# Patient Record
Sex: MALE | Race: WHITE | NOT HISPANIC OR LATINO | Employment: FULL TIME | URBAN - METROPOLITAN AREA
[De-identification: names, ages, dates, MRNs, and addresses within clinical notes are randomized per-mention and may not be internally consistent; named-entity substitution may affect disease eponyms.]

---

## 2017-07-18 ENCOUNTER — HOSPITAL ENCOUNTER (INPATIENT)
Facility: HOSPITAL | Age: 72
LOS: 6 days | Discharge: HOME HEALTH | DRG: 872 | End: 2017-07-24
Attending: EMERGENCY MEDICINE | Admitting: HOSPITALIST
Payer: COMMERCIAL

## 2017-07-18 DIAGNOSIS — R60.9 EDEMA: ICD-10-CM

## 2017-07-18 DIAGNOSIS — E11.59 CONTROLLED TYPE 2 DIABETES MELLITUS WITH OTHER CIRCULATORY COMPLICATION, WITHOUT LONG-TERM CURRENT USE OF INSULIN: Chronic | ICD-10-CM

## 2017-07-18 DIAGNOSIS — L03.116 CELLULITIS OF LEFT LOWER EXTREMITY: ICD-10-CM

## 2017-07-18 DIAGNOSIS — A41.9 SEPSIS, DUE TO UNSPECIFIED ORGANISM: Primary | ICD-10-CM

## 2017-07-18 DIAGNOSIS — I10 ESSENTIAL HYPERTENSION: Chronic | ICD-10-CM

## 2017-07-18 PROBLEM — N39.0 UTI (URINARY TRACT INFECTION): Status: ACTIVE | Noted: 2017-07-18

## 2017-07-18 PROBLEM — R65.20 SEVERE SEPSIS: Status: ACTIVE | Noted: 2017-07-18

## 2017-07-18 PROBLEM — N17.9 ACUTE RENAL FAILURE: Status: ACTIVE | Noted: 2017-07-18

## 2017-07-18 PROBLEM — E44.1 MILD PROTEIN MALNUTRITION: Chronic | Status: ACTIVE | Noted: 2017-07-18

## 2017-07-18 PROBLEM — D63.8 ANEMIA OF CHRONIC DISEASE: Chronic | Status: ACTIVE | Noted: 2017-07-18

## 2017-07-18 PROBLEM — L03.115 CELLULITIS OF RIGHT LOWER EXTREMITY: Status: ACTIVE | Noted: 2017-07-18

## 2017-07-18 PROBLEM — E11.9 TYPE 2 DIABETES MELLITUS, CONTROLLED: Chronic | Status: ACTIVE | Noted: 2017-07-18

## 2017-07-18 LAB
ALBUMIN SERPL BCP-MCNC: 3.4 G/DL
ALP SERPL-CCNC: 77 U/L
ALT SERPL W/O P-5'-P-CCNC: 41 U/L
ANION GAP SERPL CALC-SCNC: 10 MMOL/L
ANISOCYTOSIS BLD QL SMEAR: SLIGHT
AST SERPL-CCNC: 54 U/L
BACTERIA #/AREA URNS HPF: ABNORMAL /HPF
BASOPHILS # BLD AUTO: 0.03 K/UL
BASOPHILS NFR BLD: 0.1 %
BILIRUB SERPL-MCNC: 2 MG/DL
BILIRUB UR QL STRIP: ABNORMAL
BUN SERPL-MCNC: 40 MG/DL
CALCIUM SERPL-MCNC: 9 MG/DL
CHLORIDE SERPL-SCNC: 95 MMOL/L
CLARITY UR: ABNORMAL
CO2 SERPL-SCNC: 23 MMOL/L
COLOR UR: ABNORMAL
CREAT SERPL-MCNC: 2.5 MG/DL
DACRYOCYTES BLD QL SMEAR: ABNORMAL
DIFFERENTIAL METHOD: ABNORMAL
EOSINOPHIL # BLD AUTO: 0.1 K/UL
EOSINOPHIL NFR BLD: 0.2 %
ERYTHROCYTE [DISTWIDTH] IN BLOOD BY AUTOMATED COUNT: 16.2 %
EST. GFR  (AFRICAN AMERICAN): 28 ML/MIN/1.73 M^2
EST. GFR  (NON AFRICAN AMERICAN): 24 ML/MIN/1.73 M^2
GLUCOSE SERPL-MCNC: 200 MG/DL
GLUCOSE UR QL STRIP: NEGATIVE
HCT VFR BLD AUTO: 36.4 %
HGB BLD-MCNC: 12.7 G/DL
HGB UR QL STRIP: ABNORMAL
HYALINE CASTS #/AREA URNS LPF: 8 /LPF
HYPOCHROMIA BLD QL SMEAR: ABNORMAL
KETONES UR QL STRIP: ABNORMAL
LACTATE SERPL-SCNC: 2.2 MMOL/L
LEUKOCYTE ESTERASE UR QL STRIP: NEGATIVE
LYMPHOCYTES # BLD AUTO: 1.1 K/UL
LYMPHOCYTES NFR BLD: 3.9 %
MCH RBC QN AUTO: 33.8 PG
MCHC RBC AUTO-ENTMCNC: 34.9 %
MCV RBC AUTO: 97 FL
MICROSCOPIC COMMENT: ABNORMAL
MONOCYTES # BLD AUTO: 1.5 K/UL
MONOCYTES NFR BLD: 5.6 %
NEUTROPHILS # BLD AUTO: 24.7 K/UL
NEUTROPHILS NFR BLD: 90.2 %
NITRITE UR QL STRIP: NEGATIVE
OVALOCYTES BLD QL SMEAR: ABNORMAL
PH UR STRIP: 5 [PH] (ref 5–8)
PLATELET # BLD AUTO: 304 K/UL
PMV BLD AUTO: 11 FL
POCT GLUCOSE: 198 MG/DL (ref 70–110)
POIKILOCYTOSIS BLD QL SMEAR: SLIGHT
POLYCHROMASIA BLD QL SMEAR: ABNORMAL
POTASSIUM SERPL-SCNC: 4.4 MMOL/L
PROT SERPL-MCNC: 7.1 G/DL
PROT UR QL STRIP: ABNORMAL
RBC # BLD AUTO: 3.76 M/UL
RBC #/AREA URNS HPF: 2 /HPF (ref 0–4)
SODIUM SERPL-SCNC: 128 MMOL/L
SP GR UR STRIP: 1.02 (ref 1–1.03)
SQUAMOUS #/AREA URNS HPF: 4 /HPF
TARGETS BLD QL SMEAR: ABNORMAL
URN SPEC COLLECT METH UR: ABNORMAL
UROBILINOGEN UR STRIP-ACNC: 1 EU/DL
WBC # BLD AUTO: 27.45 K/UL
WBC #/AREA URNS HPF: 8 /HPF (ref 0–5)
WBC CASTS #/AREA URNS LPF: 1 /LPF

## 2017-07-18 PROCEDURE — 82570 ASSAY OF URINE CREATININE: CPT

## 2017-07-18 PROCEDURE — 82962 GLUCOSE BLOOD TEST: CPT

## 2017-07-18 PROCEDURE — 12000002 HC ACUTE/MED SURGE SEMI-PRIVATE ROOM

## 2017-07-18 PROCEDURE — 93005 ELECTROCARDIOGRAM TRACING: CPT

## 2017-07-18 PROCEDURE — 63600175 PHARM REV CODE 636 W HCPCS: Performed by: EMERGENCY MEDICINE

## 2017-07-18 PROCEDURE — 83605 ASSAY OF LACTIC ACID: CPT

## 2017-07-18 PROCEDURE — 96366 THER/PROPH/DIAG IV INF ADDON: CPT

## 2017-07-18 PROCEDURE — 80053 COMPREHEN METABOLIC PANEL: CPT

## 2017-07-18 PROCEDURE — 87040 BLOOD CULTURE FOR BACTERIA: CPT

## 2017-07-18 PROCEDURE — 96365 THER/PROPH/DIAG IV INF INIT: CPT

## 2017-07-18 PROCEDURE — 21400001 HC TELEMETRY ROOM

## 2017-07-18 PROCEDURE — 84300 ASSAY OF URINE SODIUM: CPT

## 2017-07-18 PROCEDURE — 85025 COMPLETE CBC W/AUTO DIFF WBC: CPT

## 2017-07-18 PROCEDURE — 96367 TX/PROPH/DG ADDL SEQ IV INF: CPT

## 2017-07-18 PROCEDURE — 25000003 PHARM REV CODE 250: Performed by: EMERGENCY MEDICINE

## 2017-07-18 PROCEDURE — 81000 URINALYSIS NONAUTO W/SCOPE: CPT

## 2017-07-18 PROCEDURE — 99285 EMERGENCY DEPT VISIT HI MDM: CPT | Mod: 25

## 2017-07-18 PROCEDURE — 87086 URINE CULTURE/COLONY COUNT: CPT

## 2017-07-18 PROCEDURE — 96361 HYDRATE IV INFUSION ADD-ON: CPT

## 2017-07-18 RX ORDER — METOPROLOL SUCCINATE 25 MG/1
25 TABLET, EXTENDED RELEASE ORAL 2 TIMES DAILY
Status: ON HOLD | COMMUNITY
End: 2017-07-24 | Stop reason: HOSPADM

## 2017-07-18 RX ORDER — SULFAMETHOXAZOLE AND TRIMETHOPRIM 800; 160 MG/1; MG/1
1 TABLET ORAL 2 TIMES DAILY
Status: ON HOLD | COMMUNITY
End: 2017-07-24 | Stop reason: HOSPADM

## 2017-07-18 RX ORDER — SULINDAC 150 MG/1
150 TABLET ORAL 2 TIMES DAILY
Status: ON HOLD | COMMUNITY
End: 2017-07-24 | Stop reason: HOSPADM

## 2017-07-18 RX ORDER — ACETAMINOPHEN 500 MG
1000 TABLET ORAL
Status: COMPLETED | OUTPATIENT
Start: 2017-07-18 | End: 2017-07-18

## 2017-07-18 RX ORDER — SODIUM CHLORIDE 9 MG/ML
1000 INJECTION, SOLUTION INTRAVENOUS
Status: COMPLETED | OUTPATIENT
Start: 2017-07-18 | End: 2017-07-18

## 2017-07-18 RX ADMIN — PIPERACILLIN SODIUM AND TAZOBACTAM SODIUM 4.5 G: 4; .5 INJECTION, POWDER, LYOPHILIZED, FOR SOLUTION INTRAVENOUS at 10:07

## 2017-07-18 RX ADMIN — VANCOMYCIN HYDROCHLORIDE 1000 MG: 1 INJECTION, POWDER, LYOPHILIZED, FOR SOLUTION INTRAVENOUS at 10:07

## 2017-07-18 RX ADMIN — ACETAMINOPHEN 1000 MG: 500 TABLET ORAL at 06:07

## 2017-07-18 RX ADMIN — SODIUM CHLORIDE 1000 ML: 0.9 INJECTION, SOLUTION INTRAVENOUS at 06:07

## 2017-07-18 RX ADMIN — SODIUM CHLORIDE 1000 ML: 0.9 INJECTION, SOLUTION INTRAVENOUS at 10:07

## 2017-07-18 NOTE — LETTER
July 24, 2017         [place] [address]  Phone: [phone]       Patient: Axel Burgos   YOB: 1945  Date of Visit: 07/24/2017    To Whom It May Concern:    Axel Waters was at [facility] on 7/18/2017 to 07/24/2017. He may return to work on 8/7/2017 with no restrictions. If you have any questions or concerns, or if I can be of further assistance, please do not hesitate to contact me.    Sincerely,    Leona Gracia MD
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
## 2017-07-19 PROBLEM — L03.116 CELLULITIS OF LEFT LOWER EXTREMITY: Status: ACTIVE | Noted: 2017-07-18

## 2017-07-19 LAB
ALBUMIN SERPL BCP-MCNC: 3 G/DL
ALP SERPL-CCNC: 72 U/L
ALT SERPL W/O P-5'-P-CCNC: 38 U/L
ANION GAP SERPL CALC-SCNC: 8 MMOL/L
AST SERPL-CCNC: 56 U/L
BASOPHILS # BLD AUTO: 0.02 K/UL
BASOPHILS NFR BLD: 0.1 %
BILIRUB SERPL-MCNC: 1.9 MG/DL
BUN SERPL-MCNC: 34 MG/DL
CALCIUM SERPL-MCNC: 8.7 MG/DL
CHLORIDE SERPL-SCNC: 101 MMOL/L
CO2 SERPL-SCNC: 26 MMOL/L
CREAT SERPL-MCNC: 1.8 MG/DL
CREAT UR-MCNC: 293.8 MG/DL
DIFFERENTIAL METHOD: ABNORMAL
EOSINOPHIL # BLD AUTO: 0 K/UL
EOSINOPHIL NFR BLD: 0.2 %
ERYTHROCYTE [DISTWIDTH] IN BLOOD BY AUTOMATED COUNT: 15.8 %
EST. GFR  (AFRICAN AMERICAN): 42 ML/MIN/1.73 M^2
EST. GFR  (NON AFRICAN AMERICAN): 36 ML/MIN/1.73 M^2
ESTIMATED AVG GLUCOSE: 140 MG/DL
GLUCOSE SERPL-MCNC: 117 MG/DL
HBA1C MFR BLD HPLC: 6.5 %
HCT VFR BLD AUTO: 36.1 %
HGB BLD-MCNC: 12.2 G/DL
LYMPHOCYTES # BLD AUTO: 1.2 K/UL
LYMPHOCYTES NFR BLD: 7.4 %
MAGNESIUM SERPL-MCNC: 2 MG/DL
MCH RBC QN AUTO: 32.6 PG
MCHC RBC AUTO-ENTMCNC: 33.8 %
MCV RBC AUTO: 97 FL
MONOCYTES # BLD AUTO: 1.3 K/UL
MONOCYTES NFR BLD: 7.6 %
NEUTROPHILS # BLD AUTO: 14.1 K/UL
NEUTROPHILS NFR BLD: 84.7 %
PHOSPHATE SERPL-MCNC: 2.7 MG/DL
PLATELET # BLD AUTO: 124 K/UL
PMV BLD AUTO: 10.9 FL
POCT GLUCOSE: 129 MG/DL (ref 70–110)
POCT GLUCOSE: 166 MG/DL (ref 70–110)
POCT GLUCOSE: 179 MG/DL (ref 70–110)
POCT GLUCOSE: 191 MG/DL (ref 70–110)
POTASSIUM SERPL-SCNC: 3.9 MMOL/L
PROT SERPL-MCNC: 6.6 G/DL
RBC # BLD AUTO: 3.74 M/UL
SODIUM SERPL-SCNC: 135 MMOL/L
SODIUM UR-SCNC: 34 MMOL/L
WBC # BLD AUTO: 16.63 K/UL

## 2017-07-19 PROCEDURE — 63600175 PHARM REV CODE 636 W HCPCS: Performed by: INTERNAL MEDICINE

## 2017-07-19 PROCEDURE — 25000003 PHARM REV CODE 250: Performed by: EMERGENCY MEDICINE

## 2017-07-19 PROCEDURE — 63600175 PHARM REV CODE 636 W HCPCS: Performed by: EMERGENCY MEDICINE

## 2017-07-19 PROCEDURE — 83735 ASSAY OF MAGNESIUM: CPT

## 2017-07-19 PROCEDURE — 84100 ASSAY OF PHOSPHORUS: CPT

## 2017-07-19 PROCEDURE — 36415 COLL VENOUS BLD VENIPUNCTURE: CPT

## 2017-07-19 PROCEDURE — 83036 HEMOGLOBIN GLYCOSYLATED A1C: CPT

## 2017-07-19 PROCEDURE — 85025 COMPLETE CBC W/AUTO DIFF WBC: CPT

## 2017-07-19 PROCEDURE — 25000003 PHARM REV CODE 250: Performed by: INTERNAL MEDICINE

## 2017-07-19 PROCEDURE — 80053 COMPREHEN METABOLIC PANEL: CPT

## 2017-07-19 PROCEDURE — 21400001 HC TELEMETRY ROOM

## 2017-07-19 RX ORDER — SODIUM CHLORIDE 9 MG/ML
INJECTION, SOLUTION INTRAVENOUS CONTINUOUS
Status: ACTIVE | OUTPATIENT
Start: 2017-07-19 | End: 2017-07-20

## 2017-07-19 RX ORDER — INSULIN ASPART 100 [IU]/ML
0-5 INJECTION, SOLUTION INTRAVENOUS; SUBCUTANEOUS
Status: DISCONTINUED | OUTPATIENT
Start: 2017-07-19 | End: 2017-07-24 | Stop reason: HOSPADM

## 2017-07-19 RX ORDER — CLONIDINE HYDROCHLORIDE 0.1 MG/1
0.1 TABLET ORAL 3 TIMES DAILY PRN
Status: DISCONTINUED | OUTPATIENT
Start: 2017-07-19 | End: 2017-07-24 | Stop reason: HOSPADM

## 2017-07-19 RX ORDER — HEPARIN SODIUM 5000 [USP'U]/ML
5000 INJECTION, SOLUTION INTRAVENOUS; SUBCUTANEOUS EVERY 12 HOURS
Status: DISCONTINUED | OUTPATIENT
Start: 2017-07-19 | End: 2017-07-24 | Stop reason: HOSPADM

## 2017-07-19 RX ORDER — METOPROLOL SUCCINATE 25 MG/1
25 TABLET, EXTENDED RELEASE ORAL DAILY
Status: DISCONTINUED | OUTPATIENT
Start: 2017-07-19 | End: 2017-07-24 | Stop reason: HOSPADM

## 2017-07-19 RX ORDER — IBUPROFEN 200 MG
24 TABLET ORAL
Status: DISCONTINUED | OUTPATIENT
Start: 2017-07-19 | End: 2017-07-24 | Stop reason: HOSPADM

## 2017-07-19 RX ORDER — ONDANSETRON 2 MG/ML
8 INJECTION INTRAMUSCULAR; INTRAVENOUS EVERY 8 HOURS PRN
Status: DISCONTINUED | OUTPATIENT
Start: 2017-07-19 | End: 2017-07-24 | Stop reason: HOSPADM

## 2017-07-19 RX ORDER — HYDROCODONE BITARTRATE AND ACETAMINOPHEN 5; 325 MG/1; MG/1
1 TABLET ORAL EVERY 4 HOURS PRN
Status: DISCONTINUED | OUTPATIENT
Start: 2017-07-19 | End: 2017-07-19

## 2017-07-19 RX ORDER — RAMELTEON 8 MG/1
8 TABLET ORAL NIGHTLY PRN
Status: DISCONTINUED | OUTPATIENT
Start: 2017-07-19 | End: 2017-07-24 | Stop reason: HOSPADM

## 2017-07-19 RX ORDER — HYDROCODONE BITARTRATE AND ACETAMINOPHEN 10; 325 MG/1; MG/1
1 TABLET ORAL EVERY 4 HOURS PRN
Status: DISCONTINUED | OUTPATIENT
Start: 2017-07-19 | End: 2017-07-19

## 2017-07-19 RX ORDER — ONDANSETRON 8 MG/1
8 TABLET, ORALLY DISINTEGRATING ORAL EVERY 8 HOURS PRN
Status: DISCONTINUED | OUTPATIENT
Start: 2017-07-19 | End: 2017-07-19

## 2017-07-19 RX ORDER — IBUPROFEN 200 MG
16 TABLET ORAL
Status: DISCONTINUED | OUTPATIENT
Start: 2017-07-19 | End: 2017-07-24 | Stop reason: HOSPADM

## 2017-07-19 RX ORDER — SODIUM CHLORIDE 0.9 % (FLUSH) 0.9 %
3 SYRINGE (ML) INJECTION EVERY 8 HOURS
Status: DISCONTINUED | OUTPATIENT
Start: 2017-07-19 | End: 2017-07-19

## 2017-07-19 RX ORDER — HEPARIN SODIUM 5000 [USP'U]/ML
5000 INJECTION, SOLUTION INTRAVENOUS; SUBCUTANEOUS EVERY 8 HOURS
Status: DISCONTINUED | OUTPATIENT
Start: 2017-07-19 | End: 2017-07-19

## 2017-07-19 RX ORDER — GLUCAGON 1 MG
1 KIT INJECTION
Status: DISCONTINUED | OUTPATIENT
Start: 2017-07-19 | End: 2017-07-24 | Stop reason: HOSPADM

## 2017-07-19 RX ORDER — HYDROCODONE BITARTRATE AND ACETAMINOPHEN 5; 325 MG/1; MG/1
1 TABLET ORAL EVERY 4 HOURS PRN
Status: DISCONTINUED | OUTPATIENT
Start: 2017-07-19 | End: 2017-07-24 | Stop reason: HOSPADM

## 2017-07-19 RX ORDER — FAMOTIDINE 20 MG/1
20 TABLET, FILM COATED ORAL DAILY
Status: DISCONTINUED | OUTPATIENT
Start: 2017-07-19 | End: 2017-07-19

## 2017-07-19 RX ORDER — AMOXICILLIN 250 MG
1 CAPSULE ORAL 2 TIMES DAILY
Status: DISCONTINUED | OUTPATIENT
Start: 2017-07-19 | End: 2017-07-19

## 2017-07-19 RX ORDER — INSULIN ASPART 100 [IU]/ML
3 INJECTION, SOLUTION INTRAVENOUS; SUBCUTANEOUS
Status: DISCONTINUED | OUTPATIENT
Start: 2017-07-19 | End: 2017-07-24 | Stop reason: HOSPADM

## 2017-07-19 RX ORDER — ACETAMINOPHEN 500 MG
500 TABLET ORAL EVERY 6 HOURS PRN
Status: DISCONTINUED | OUTPATIENT
Start: 2017-07-19 | End: 2017-07-24 | Stop reason: HOSPADM

## 2017-07-19 RX ORDER — ACETAMINOPHEN 325 MG/1
650 TABLET ORAL EVERY 8 HOURS PRN
Status: DISCONTINUED | OUTPATIENT
Start: 2017-07-19 | End: 2017-07-19

## 2017-07-19 RX ADMIN — INSULIN ASPART 3 UNITS: 100 INJECTION, SOLUTION INTRAVENOUS; SUBCUTANEOUS at 12:07

## 2017-07-19 RX ADMIN — PIPERACILLIN SODIUM AND TAZOBACTAM SODIUM 4.5 G: 4; .5 INJECTION, POWDER, LYOPHILIZED, FOR SOLUTION INTRAVENOUS at 03:07

## 2017-07-19 RX ADMIN — INSULIN ASPART 3 UNITS: 100 INJECTION, SOLUTION INTRAVENOUS; SUBCUTANEOUS at 06:07

## 2017-07-19 RX ADMIN — HEPARIN SODIUM 5000 UNITS: 5000 INJECTION, SOLUTION INTRAVENOUS; SUBCUTANEOUS at 09:07

## 2017-07-19 RX ADMIN — HYDROCODONE BITARTRATE AND ACETAMINOPHEN 1 TABLET: 5; 325 TABLET ORAL at 10:07

## 2017-07-19 RX ADMIN — METOPROLOL SUCCINATE 25 MG: 25 TABLET, EXTENDED RELEASE ORAL at 10:07

## 2017-07-19 RX ADMIN — PIPERACILLIN SODIUM AND TAZOBACTAM SODIUM 4.5 G: 4; .5 INJECTION, POWDER, LYOPHILIZED, FOR SOLUTION INTRAVENOUS at 06:07

## 2017-07-19 RX ADMIN — HEPARIN SODIUM 5000 UNITS: 5000 INJECTION, SOLUTION INTRAVENOUS; SUBCUTANEOUS at 10:07

## 2017-07-19 RX ADMIN — SODIUM CHLORIDE: 0.9 INJECTION, SOLUTION INTRAVENOUS at 12:07

## 2017-07-19 RX ADMIN — INSULIN DETEMIR 10 UNITS: 100 INJECTION, SOLUTION SUBCUTANEOUS at 09:07

## 2017-07-19 RX ADMIN — INSULIN ASPART 3 UNITS: 100 INJECTION, SOLUTION INTRAVENOUS; SUBCUTANEOUS at 10:07

## 2017-07-19 RX ADMIN — VANCOMYCIN HYDROCHLORIDE 1000 MG: 1 INJECTION, POWDER, LYOPHILIZED, FOR SOLUTION INTRAVENOUS at 09:07

## 2017-07-19 RX ADMIN — SODIUM CHLORIDE 1000 ML: 0.9 INJECTION, SOLUTION INTRAVENOUS at 12:07

## 2017-07-19 NOTE — PLAN OF CARE
07/19/17 1459   Discharge Assessment   Assessment Type Discharge Planning Assessment   Confirmed/corrected address and phone number on facesheet? Yes   Assessment information obtained from? Patient   Prior to hospitilization cognitive status: Alert/Oriented   Prior to hospitalization functional status: Independent   Current cognitive status: Alert/Oriented   Current Functional Status: Independent   Arrived From admitted as an inpatient;home or self-care   Lives With friend(s)   Able to Return to Prior Arrangements yes   Is patient able to care for self after discharge? Yes   How many people do you have in your home that can help with your care after discharge? 1   Who are your caregiver(s) and their phone number(s)? Jannet schmitz girlfriend [phone]   Patient's perception of discharge disposition home or selfcare;home health   Readmission Within The Last 30 Days no previous admission in last 30 days   Patient currently being followed by outpatient case management? No   Patient currently receives home health services? No   Does the patient currently use HME? No   Patient currently receives private duty nursing? No   Patient currently receives any other outside agency services? No   Equipment Currently Used at Home none   Do you have any problems affording any of your prescribed medications? No   Is the patient taking medications as prescribed? yes   Do you have any financial concerns preventing you from receiving the healthcare you need? No   Does the patient have transportation to healthcare appointments? Yes   Transportation Available car   On Dialysis? No   Does the patient receive services at the Coumadin Clinic? No   Are there any open cases? No   Discharge Plan A Home with family   Discharge Plan B Home with family   Patient/Family In Agreement With Plan yes     SW to patient's room to discuss Helping the patient manage care at home. ARIEL roll explained to pt.  Teach back method used.  SW's name and contact  info placed on white board.  Pt/family encouraged to call for any problems/concerns with DC.

## 2017-07-19 NOTE — ASSESSMENT & PLAN NOTE
Patient's urinalysis is significant for a specific gravity of 1.025, 1+ protein, 1+ ketones, trace occult blood, 8 WBCs, and 8 hyaline casts.  Urine output has been stable.  Will obtain additional urine studies; provide aggressive IV fluid hydration; monitor the urine output; recheck the renal function in the morning; and avoid nephrotoxins.

## 2017-07-19 NOTE — ASSESSMENT & PLAN NOTE
Well-controlled on a home regimen of metformin; will provide basal-prandial insulin along with insulin sliding scale.

## 2017-07-19 NOTE — ED NOTES
Received report from Maeve MONTIEL. 1st contact with pt. Pt AAO x 3, REU, skin warm w/ redness/celluclitis on left leg and foot and hot, dry, and flaky right leg. . Side rails up x 2, bed in low position, wheels locked. Call bell within reach. Pt denies pain or dizziness at this time. Will continue to monitor.  at bedside

## 2017-07-19 NOTE — PLAN OF CARE
Problem: Patient Care Overview  Goal: Plan of Care Review  BG monitored and insulin coverage provided. No falls or injury. Bed alarm activated. Pt complaint with fall and safety plan of care. LLE hot to touch, edematous. LLE US (-) DVT. IV fluids. Pt on IV Vanc and Zosyn. Vanc T. Due 07/21/2017 2130. Med Risk-VTE. Pt on heparin. BM 07/18/2017. Consults to . Continue plan of care.

## 2017-07-19 NOTE — ASSESSMENT & PLAN NOTE
Patient has a left lower extremity lesion that is suspicious for cellulitis.  He does have a fever and leukocytosis, and therefore meets criteria for sepsis.  Ultrasound of the LLE is pending.  Blood cultures are pending and he has been started on empiric antibiotics.

## 2017-07-19 NOTE — ASSESSMENT & PLAN NOTE
Patient also complained of urinary symptoms and his urinalysis is significant for a specific gravity of 1.025, hazy appearance, 8 WBCs, and moderate bacteria.  Urine cultures are pending and he has been started on empiric antibiotic therapy.

## 2017-07-19 NOTE — SUBJECTIVE & OBJECTIVE
Past Medical History:   Diagnosis Date    Diabetes mellitus     Hypertension     Varicose veins        Past Surgical History:   Procedure Laterality Date    VARICOSE VEIN SURGERY         Review of patient's allergies indicates:  No Known Allergies    No current facility-administered medications on file prior to encounter.      Current Outpatient Prescriptions on File Prior to Encounter   Medication Sig    hydrocortisone 1 % cream Apply topically as needed.    metformin (GLUCOPHAGE) 500 MG tablet Take 500 mg by mouth 2 (two) times daily with meals.    pseudoephedrine (SUDAFED) 30 MG tablet Take 30 mg by mouth every 4 (four) hours as needed for Congestion.     Family History     None        Social History Main Topics    Smoking status: Never Smoker    Smokeless tobacco: Never Used    Alcohol use No    Drug use: No    Sexual activity: No     Review of Systems   Constitutional: Negative for activity change, appetite change, chills, diaphoresis, fatigue, fever and unexpected weight change.   HENT: Negative.    Eyes: Negative.    Respiratory: Negative for cough, chest tightness, shortness of breath and wheezing.    Cardiovascular: Negative for chest pain, palpitations and leg swelling.   Gastrointestinal: Negative for abdominal distention, abdominal pain, blood in stool, constipation, diarrhea, nausea and vomiting.   Genitourinary: Positive for frequency. Negative for dysuria, hematuria and urgency.   Musculoskeletal: Negative.    Skin:        Left leg pain, swelling, and redness   Neurological: Negative for dizziness, seizures, syncope, weakness and light-headedness.   Psychiatric/Behavioral: Negative.      Objective:     Vital Signs (Most Recent):  Temp: 97.8 °F (36.6 °C) (07/18/17 2355)  Pulse: 67 (07/18/17 2355)  Resp: 20 (07/18/17 2355)  BP: 117/61 (07/18/17 2355)  SpO2: 95 % (07/18/17 2355) Vital Signs (24h Range):  Temp:  [97.8 °F (36.6 °C)-103 °F (39.4 °C)] 97.8 °F (36.6 °C)  Pulse:  [] 67  Resp:   [20] 20  SpO2:  [95 %-98 %] 95 %  BP: (114-141)/(57-83) 117/61     Weight: 88.8 kg (195 lb 12.8 oz)  Body mass index is 25.14 kg/m².    Physical Exam   Constitutional: He is oriented to person, place, and time. He appears well-developed and well-nourished. No distress.   HENT:   Head: Normocephalic and atraumatic.   Right Ear: External ear normal.   Left Ear: External ear normal.   Nose: Nose normal.   Eyes: Right eye exhibits no discharge. Left eye exhibits no discharge.   Neck: Normal range of motion.   Cardiovascular: Normal rate, regular rhythm, normal heart sounds and intact distal pulses.  Exam reveals no gallop and no friction rub.    No murmur heard.  Pulmonary/Chest: Effort normal and breath sounds normal. No respiratory distress. He has no wheezes. He has no rales. He exhibits no tenderness.   Abdominal: Soft. Bowel sounds are normal. He exhibits no distension. There is no tenderness. There is no rebound and no guarding.   Musculoskeletal: Normal range of motion. He exhibits edema.   Neurological: He is alert and oriented to person, place, and time.   Skin: Skin is warm. Rash noted. He is not diaphoretic. There is erythema.   Circumferential erythema to the lower left leg with edema and a wound to the medial aspect of his foot, no drainage   Psychiatric: He has a normal mood and affect. His behavior is normal. Judgment and thought content normal.   Nursing note and vitals reviewed.       Significant Labs: All pertinent labs within the past 24 hours have been reviewed.    Significant Imaging: I have reviewed and interpreted all pertinent imaging results/findings within the past 24 hours.

## 2017-07-19 NOTE — PLAN OF CARE
Problem: Diabetes, Type 2 (Adult)  Goal: Signs and Symptoms of Listed Potential Problems Will be Absent, Minimized or Managed (Diabetes, Type 2)  Signs and symptoms of listed potential problems will be absent, minimized or managed by discharge/transition of care (reference Diabetes, Type 2 (Adult) CPG).   Outcome: Ongoing (interventions implemented as appropriate)   07/19/17 0307   Diabetes, Type 2   Problems Assessed (Type 2 Diabetes) all   Problems Present (Type 2 Diabetes) hyperglycemia       Problem: Fall Risk (Adult)  Goal: Identify Related Risk Factors and Signs and Symptoms  Related risk factors and signs and symptoms are identified upon initiation of Human Response Clinical Practice Guideline (CPG)   Outcome: Ongoing (interventions implemented as appropriate)   07/19/17 0307   Fall Risk   Related Risk Factors (Fall Risk) age-related changes;bladder function altered;history of falls;environment unfamiliar   Signs and Symptoms (Fall Risk) presence of risk factors     Plan of care reviewed with patient. Printout on cellulitis given to patient. Voices understanding. IV antibiotics given as ordered. Left leg red, hot to touch. Denies pain. Has been out of his metformin for most of July, but got new prescriptions from  yesterday. Stressed importance of adhering to meds. Fall precautions in progress with bed alarm in use.    Problem: Patient Care Overview  Goal: Plan of Care Review  Outcome: Ongoing (interventions implemented as appropriate)   07/19/17 0307   Coping/Psychosocial   Plan Of Care Reviewed With patient       Problem: Skin and Soft Tissue Infection (Adult)  Goal: Signs and Symptoms of Listed Potential Problems Will be Absent, Minimized or Managed (Skin and Soft Tissue Infection)  Signs and symptoms of listed potential problems will be absent, minimized or managed by discharge/transition of care (reference Skin and Soft Tissue Infection (Adult) CPG).   Outcome: Ongoing (interventions implemented as  appropriate)   07/19/17 0303   Skin and Soft Tissue Infection   Problems Assessed (Skin and Soft Tissue Infection) all   Problems Present (Skin and Soft Tissue Infection) infection progression

## 2017-07-19 NOTE — H&P
[facility] Hospital Medicine  History & Physical    Patient Name: Axel Burgos  MRN: [id number]  Admission Date: 7/18/2017  Attending Physician: Leona Gracia MD   Primary Care Provider: Ahsan Guzman MD         Patient information was obtained from patient.     Subjective:     Principal Problem:Cellulitis of left lower extremity    Chief Complaint: Confusion today.    HPI: Mr. Axel Burgos is a 72 y.o. male with essential hypertension, type 2 diabetes mellitus (HbA1c 6.4% Dec 2015), anemia of chronic disease, and mild protein malnutrition who presents to [facility] ED with complaints of confusion today.  He was observed to be driving erratically today and was pulled over by police, after which EMS was activated and thought he was in atrial flutter; he was transported to the ED here.  He denies being confused and remembers the events that transpired today.  He pulled out in front of a police car which led to his being pulled over.  He had just come from his PCP's office for which he was prescribed antibiotics for his left leg infection and a possible urinary tract infection.  He has been dealing with a left lower extremity wound for the past two months.  He admits to picking at it constantly and it started to get more red, swollen, and painful in the last few weeks.  He cannot recall any trauma to the area and it has not affected his ambulation.  He also complains that he constantly urinates and thinks that it's due to his prostate problems.  He denies any dysuria but does dribble a lot.  He also thinks that it's more concentrated than usual.  He has not experienced any fevers, chills, nausea, nor any vomiting, but has been feeling a bit weak lately.  His appetite has been good and he hasn't lost any weight.  He was not able to fill his medications.    He does report an episode of falling today.  He was bending over to  some change but had lost his balance and fell.  He denies any  antecedent chest pain, shortness of breath, nor palpitations, and did not sustain any head trauma nor loss of consciousness.    Chart Review:  Previous Hospitalizations  Date Hospital Diagnosis   Dec 2015 [facility] RLE cellulitis      Past Medical History:   Diagnosis Date    Diabetes mellitus     Hypertension     Varicose veins        Past Surgical History:   Procedure Laterality Date    VARICOSE VEIN SURGERY         Review of patient's allergies indicates:  No Known Allergies    No current facility-administered medications on file prior to encounter.      Current Outpatient Prescriptions on File Prior to Encounter   Medication Sig    hydrocortisone 1 % cream Apply topically as needed.    metformin (GLUCOPHAGE) 500 MG tablet Take 500 mg by mouth 2 (two) times daily with meals.    pseudoephedrine (SUDAFED) 30 MG tablet Take 30 mg by mouth every 4 (four) hours as needed for Congestion.     Family History     None        Social History Main Topics    Smoking status: Never Smoker    Smokeless tobacco: Never Used    Alcohol use No    Drug use: No    Sexual activity: No     Review of Systems   Constitutional: Negative for activity change, appetite change, chills, diaphoresis, fatigue, fever and unexpected weight change.   HENT: Negative.    Eyes: Negative.    Respiratory: Negative for cough, chest tightness, shortness of breath and wheezing.    Cardiovascular: Negative for chest pain, palpitations and leg swelling.   Gastrointestinal: Negative for abdominal distention, abdominal pain, blood in stool, constipation, diarrhea, nausea and vomiting.   Genitourinary: Positive for frequency. Negative for dysuria, hematuria and urgency.   Musculoskeletal: Negative.    Skin:        Left leg pain, swelling, and redness   Neurological: Negative for dizziness, seizures, syncope, weakness and light-headedness.   Psychiatric/Behavioral: Negative.      Objective:     Vital Signs (Most Recent):  Temp: 97.8 °F (36.6 °C)  (07/18/17 2355)  Pulse: 67 (07/18/17 2355)  Resp: 20 (07/18/17 2355)  BP: 117/61 (07/18/17 2355)  SpO2: 95 % (07/18/17 2355) Vital Signs (24h Range):  Temp:  [97.8 °F (36.6 °C)-103 °F (39.4 °C)] 97.8 °F (36.6 °C)  Pulse:  [] 67  Resp:  [20] 20  SpO2:  [95 %-98 %] 95 %  BP: (114-141)/(57-83) 117/61     Weight: 88.8 kg (195 lb 12.8 oz)  Body mass index is 25.14 kg/m².    Physical Exam   Constitutional: He is oriented to person, place, and time. He appears well-developed and well-nourished. No distress.   HENT:   Head: Normocephalic and atraumatic.   Right Ear: External ear normal.   Left Ear: External ear normal.   Nose: Nose normal.   Eyes: Right eye exhibits no discharge. Left eye exhibits no discharge.   Neck: Normal range of motion.   Cardiovascular: Normal rate, regular rhythm, normal heart sounds and intact distal pulses.  Exam reveals no gallop and no friction rub.    No murmur heard.  Pulmonary/Chest: Effort normal and breath sounds normal. No respiratory distress. He has no wheezes. He has no rales. He exhibits no tenderness.   Abdominal: Soft. Bowel sounds are normal. He exhibits no distension. There is no tenderness. There is no rebound and no guarding.   Musculoskeletal: Normal range of motion. He exhibits edema.   Neurological: He is alert and oriented to person, place, and time.   Skin: Skin is warm. Rash noted. He is not diaphoretic. There is erythema.   Circumferential erythema to the lower left leg with edema and a wound to the medial aspect of his foot, no drainage   Psychiatric: He has a normal mood and affect. His behavior is normal. Judgment and thought content normal.   Nursing note and vitals reviewed.       Significant Labs: All pertinent labs within the past 24 hours have been reviewed.    Significant Imaging: I have reviewed and interpreted all pertinent imaging results/findings within the past 24 hours.    Assessment/Plan:     * Cellulitis of left lower extremity    Patient has a left  lower extremity lesion that is suspicious for cellulitis.  He does have a fever and leukocytosis, and therefore meets criteria for sepsis.  Ultrasound of the LLE is pending.  Blood cultures are pending and he has been started on empiric antibiotics.          UTI (urinary tract infection)    Patient also complained of urinary symptoms and his urinalysis is significant for a specific gravity of 1.025, hazy appearance, 8 WBCs, and moderate bacteria.  Urine cultures are pending and he has been started on empiric antibiotic therapy.        Severe sepsis    This patient meets criteria for severe sepsis given fevers, tachycardia, tachypnea, leukocytosis, right lower extremity cellulitis, presumed urinary tract infection, and acute renal failure.  Blood and urine cultures are pending; will start IV fluid hydration and broad spectrum antibiotics.        Acute renal failure    Patient's urinalysis is significant for a specific gravity of 1.025, 1+ protein, 1+ ketones, trace occult blood, 8 WBCs, and 8 hyaline casts.  Urine output has been stable.  Will obtain additional urine studies; provide aggressive IV fluid hydration; monitor the urine output; recheck the renal function in the morning; and avoid nephrotoxins.        Essential hypertension    Patient's blood pressure is well-controlled; will continue home regimen of metoprolol, and provide as-needed clonidine.        Type 2 diabetes mellitus, controlled    Well-controlled on a home regimen of metformin; will provide basal-prandial insulin along with insulin sliding scale.        Anemia of chronic disease    The patient's H/H is stable and consistent with previous laboratory measurements, and the patient exhibits no signs or symptoms of acute bleeding; there is no indication for transfusion.  Will continue to monitor.        Mild protein malnutrition    Will provide protein supplementation with Boost Glucose.          VTE Risk Mitigation         Ordered     heparin  (porcine) injection 5,000 Units  Every 12 hours     Route:  Subcutaneous        07/19/17 0014     Medium Risk of VTE  Once      07/19/17 0014            Total time spent on case: 45 minutes.        Cari Benitez M.D.  Staff Nocturnist  Department of Hospital Medicine  [facility]  Pager: [phone]

## 2017-07-19 NOTE — HPI
Mr. Axel Burgos is a 72 y.o. male with essential hypertension, type 2 diabetes mellitus (HbA1c 6.4% Dec 2015), anemia of chronic disease, and mild protein malnutrition who presents to [facility] ED with complaints of confusion today.  He was observed to be driving erratically today and was pulled over by police, after which EMS was activated and thought he was in atrial flutter; he was transported to the ED here.  He denies being confused and remembers the events that transpired today.  He pulled out in front of a police car which led to his being pulled over.  He had just come from his PCP's office for which he was prescribed antibiotics for his left leg infection and a possible urinary tract infection.  He has been dealing with a left lower extremity wound for the past two months.  He admits to picking at it constantly and it started to get more red, swollen, and painful in the last few weeks.  He cannot recall any trauma to the area and it has not affected his ambulation.  He also complains that he constantly urinates and thinks that it's due to his prostate problems.  He denies any dysuria but does dribble a lot.  He also thinks that it's more concentrated than usual.  He has not experienced any fevers, chills, nausea, nor any vomiting, but has been feeling a bit weak lately.  His appetite has been good and he hasn't lost any weight.  He was not able to fill his medications.    He does report an episode of falling today.  He was bending over to  some change but had lost his balance and fell.  He denies any antecedent chest pain, shortness of breath, nor palpitations, and did not sustain any head trauma nor loss of consciousness.

## 2017-07-19 NOTE — ASSESSMENT & PLAN NOTE
Patient's blood pressure is well-controlled; will continue home regimen of metoprolol, and provide as-needed clonidine.

## 2017-07-19 NOTE — ASSESSMENT & PLAN NOTE
This patient meets criteria for severe sepsis given fevers, tachycardia, tachypnea, leukocytosis, right lower extremity cellulitis, presumed urinary tract infection, and acute renal failure.  Blood and urine cultures are pending; will start IV fluid hydration and broad spectrum antibiotics.

## 2017-07-20 LAB
BACTERIA UR CULT: NORMAL
POCT GLUCOSE: 123 MG/DL (ref 70–110)
POCT GLUCOSE: 132 MG/DL (ref 70–110)
POCT GLUCOSE: 132 MG/DL (ref 70–110)
POCT GLUCOSE: 201 MG/DL (ref 70–110)

## 2017-07-20 PROCEDURE — 63600175 PHARM REV CODE 636 W HCPCS: Performed by: INTERNAL MEDICINE

## 2017-07-20 PROCEDURE — 63600175 PHARM REV CODE 636 W HCPCS: Performed by: EMERGENCY MEDICINE

## 2017-07-20 PROCEDURE — 21400001 HC TELEMETRY ROOM

## 2017-07-20 PROCEDURE — 25000003 PHARM REV CODE 250: Performed by: INTERNAL MEDICINE

## 2017-07-20 PROCEDURE — 25000003 PHARM REV CODE 250: Performed by: HOSPITALIST

## 2017-07-20 PROCEDURE — 25000003 PHARM REV CODE 250: Performed by: EMERGENCY MEDICINE

## 2017-07-20 RX ORDER — DOXYLAMINE SUCCINATE 25 MG
TABLET ORAL 2 TIMES DAILY
Status: DISCONTINUED | OUTPATIENT
Start: 2017-07-20 | End: 2017-07-24 | Stop reason: HOSPADM

## 2017-07-20 RX ADMIN — PIPERACILLIN SODIUM AND TAZOBACTAM SODIUM 4.5 G: 4; .5 INJECTION, POWDER, LYOPHILIZED, FOR SOLUTION INTRAVENOUS at 06:07

## 2017-07-20 RX ADMIN — PIPERACILLIN SODIUM AND TAZOBACTAM SODIUM 4.5 G: 4; .5 INJECTION, POWDER, LYOPHILIZED, FOR SOLUTION INTRAVENOUS at 01:07

## 2017-07-20 RX ADMIN — INSULIN ASPART 3 UNITS: 100 INJECTION, SOLUTION INTRAVENOUS; SUBCUTANEOUS at 04:07

## 2017-07-20 RX ADMIN — PIPERACILLIN SODIUM AND TAZOBACTAM SODIUM 4.5 G: 4; .5 INJECTION, POWDER, LYOPHILIZED, FOR SOLUTION INTRAVENOUS at 11:07

## 2017-07-20 RX ADMIN — INSULIN DETEMIR 10 UNITS: 100 INJECTION, SOLUTION SUBCUTANEOUS at 09:07

## 2017-07-20 RX ADMIN — METOPROLOL SUCCINATE 25 MG: 25 TABLET, EXTENDED RELEASE ORAL at 08:07

## 2017-07-20 RX ADMIN — INSULIN ASPART 3 UNITS: 100 INJECTION, SOLUTION INTRAVENOUS; SUBCUTANEOUS at 11:07

## 2017-07-20 RX ADMIN — HEPARIN SODIUM 5000 UNITS: 5000 INJECTION, SOLUTION INTRAVENOUS; SUBCUTANEOUS at 09:07

## 2017-07-20 RX ADMIN — HEPARIN SODIUM 5000 UNITS: 5000 INJECTION, SOLUTION INTRAVENOUS; SUBCUTANEOUS at 08:07

## 2017-07-20 RX ADMIN — INSULIN ASPART 3 UNITS: 100 INJECTION, SOLUTION INTRAVENOUS; SUBCUTANEOUS at 08:07

## 2017-07-20 RX ADMIN — INSULIN ASPART 1 UNITS: 100 INJECTION, SOLUTION INTRAVENOUS; SUBCUTANEOUS at 09:07

## 2017-07-20 RX ADMIN — VANCOMYCIN HYDROCHLORIDE 1000 MG: 1 INJECTION, POWDER, LYOPHILIZED, FOR SOLUTION INTRAVENOUS at 09:07

## 2017-07-20 RX ADMIN — MICONAZOLE NITRATE: 20 CREAM TOPICAL at 11:07

## 2017-07-20 RX ADMIN — PIPERACILLIN SODIUM AND TAZOBACTAM SODIUM 4.5 G: 4; .5 INJECTION, POWDER, LYOPHILIZED, FOR SOLUTION INTRAVENOUS at 02:07

## 2017-07-20 NOTE — NURSING
Resting quietly in bed.  Able to make needs known.  Denies pain at this time.  Patient states awareness of pending room transfer.  Encouraged to call with PRN assist.  Instructed noted to attempt ambulation without assist.  Verbalized understanding.  Bed alarm set. Needs reinforcement of all teaching.  LLE noted to continue to be red, hot to touch, and edematous.  Call bell within reach.  Will continue to monitor.

## 2017-07-20 NOTE — PLAN OF CARE
Problem: Diabetes, Type 2 (Adult)  Goal: Signs and Symptoms of Listed Potential Problems Will be Absent, Minimized or Managed (Diabetes, Type 2)  Signs and symptoms of listed potential problems will be absent, minimized or managed by discharge/transition of care (reference Diabetes, Type 2 (Adult) CPG).   Outcome: Ongoing (interventions implemented as appropriate)   07/20/17 1817   Diabetes, Type 2   Problems Assessed (Type 2 Diabetes) all       Problem: Fall Risk (Adult)  Goal: Identify Related Risk Factors and Signs and Symptoms  Related risk factors and signs and symptoms are identified upon initiation of Human Response Clinical Practice Guideline (CPG)   Outcome: Ongoing (interventions implemented as appropriate)   07/20/17 1817   Fall Risk   Related Risk Factors (Fall Risk) history of falls;bladder function altered;polypharmacy;age-related changes;environment unfamiliar   Signs and Symptoms (Fall Risk) presence of risk factors     Goal: Absence of Falls  Patient will demonstrate the desired outcomes by discharge/transition of care.   Outcome: Ongoing (interventions implemented as appropriate)   07/20/17 1817   Fall Risk (Adult)   Absence of Falls making progress toward outcome       Problem: Skin and Soft Tissue Infection (Adult)  Goal: Signs and Symptoms of Listed Potential Problems Will be Absent, Minimized or Managed (Skin and Soft Tissue Infection)  Signs and symptoms of listed potential problems will be absent, minimized or managed by discharge/transition of care (reference Skin and Soft Tissue Infection (Adult) CPG).   Outcome: Ongoing (interventions implemented as appropriate)   07/19/17 0307 07/20/17 1817   Skin and Soft Tissue Infection   Problems Assessed (Skin and Soft Tissue Infection) --  all   Problems Present (Skin and Soft Tissue Infection) infection progression --    Patient BG WNL today, no additional insulin SS coverage needed today, patient with good apettite and no complaints of pain.  Patient without fall this shift, bed alarm on, standby assist walking to bathroom. LLE with warmth,  redness and flaky skin on ankle/foot,  but borders more defined and less redness than yesterday, no new skin breakdown; zosyn continued, IVF not renewed. Patient able to make needs known.

## 2017-07-20 NOTE — PLAN OF CARE
Problem: Fall Risk (Adult)  Intervention: Reduce Risk/Promote Restraint Free Environment   17   Safety Interventions   Environmental Safety Modification assistive device/personal items within reach;clutter free environment maintained;lighting adjusted;room near unit station   Prevent  Drop/Fall   Safety/Security Measures bed alarm set     Intervention: Review Medications/Identify Contributors to Fall Risk   17   Safety Interventions   Medication Review/Management medications reviewed     Intervention: Patient Rounds   17   Safety Interventions   Patient Rounds bed in low position;bed wheels locked;clutter free environment maintained;placement of personal items at bedside;visualized patient;toileting offered;ID band on;call light in reach     Intervention: Safety Promotion/Fall Prevention   17   Safety Interventions   Safety Promotion/Fall Prevention assistive device/personal item within reach;bed alarm set;Fall Risk reviewed with patient/family;lighting adjusted;room near unit station;nonskid shoes/socks when out of bed       Goal: Identify Related Risk Factors and Signs and Symptoms  Related risk factors and signs and symptoms are identified upon initiation of Human Response Clinical Practice Guideline (CPG)   Outcome: Ongoing (interventions implemented as appropriate)   17   Fall Risk   Related Risk Factors (Fall Risk) age-related changes;polypharmacy;environment unfamiliar   Signs and Symptoms (Fall Risk) presence of risk factors     Goal: Absence of Falls  Patient will demonstrate the desired outcomes by discharge/transition of care.   Outcome: Ongoing (interventions implemented as appropriate)   17   Fall Risk (Adult)   Absence of Falls making progress toward outcome       Problem: Patient Care Overview  Goal: Plan of Care Review  Outcome: Ongoing (interventions implemented as appropriate)   17   Coping/Psychosocial   Plan Of  Care Reviewed With patient     Patient noted to rest quietly in bed with eyes closed throughout this shift.  No complaints of pain voiced this shift. Patient noted to remain free from falls and trauma this shift.  Purposeful hourly rounding in progress.  Call bell within reach.  Will continue to monitor.

## 2017-07-21 LAB
ANION GAP SERPL CALC-SCNC: 4 MMOL/L
BASOPHILS # BLD AUTO: 0.04 K/UL
BASOPHILS NFR BLD: 0.5 %
BUN SERPL-MCNC: 25 MG/DL
CALCIUM SERPL-MCNC: 9 MG/DL
CHLORIDE SERPL-SCNC: 106 MMOL/L
CO2 SERPL-SCNC: 29 MMOL/L
CREAT SERPL-MCNC: 1.4 MG/DL
DIFFERENTIAL METHOD: ABNORMAL
EOSINOPHIL # BLD AUTO: 0.3 K/UL
EOSINOPHIL NFR BLD: 3.7 %
ERYTHROCYTE [DISTWIDTH] IN BLOOD BY AUTOMATED COUNT: 15.7 %
EST. GFR  (AFRICAN AMERICAN): 57 ML/MIN/1.73 M^2
EST. GFR  (NON AFRICAN AMERICAN): 49 ML/MIN/1.73 M^2
GLUCOSE SERPL-MCNC: 104 MG/DL
HCT VFR BLD AUTO: 36 %
HGB BLD-MCNC: 11.8 G/DL
LYMPHOCYTES # BLD AUTO: 2.1 K/UL
LYMPHOCYTES NFR BLD: 27.9 %
MCH RBC QN AUTO: 32.4 PG
MCHC RBC AUTO-ENTMCNC: 32.8 G/DL
MCV RBC AUTO: 99 FL
MONOCYTES # BLD AUTO: 0.9 K/UL
MONOCYTES NFR BLD: 11.1 %
NEUTROPHILS # BLD AUTO: 4.4 K/UL
NEUTROPHILS NFR BLD: 56.8 %
PLATELET # BLD AUTO: 154 K/UL
PMV BLD AUTO: 11.1 FL
POCT GLUCOSE: 129 MG/DL (ref 70–110)
POCT GLUCOSE: 155 MG/DL (ref 70–110)
POCT GLUCOSE: 166 MG/DL (ref 70–110)
POCT GLUCOSE: 171 MG/DL (ref 70–110)
POTASSIUM SERPL-SCNC: 4.1 MMOL/L
RBC # BLD AUTO: 3.64 M/UL
SODIUM SERPL-SCNC: 139 MMOL/L
VANCOMYCIN TROUGH SERPL-MCNC: 8.4 UG/ML
WBC # BLD AUTO: 7.67 K/UL

## 2017-07-21 PROCEDURE — 80202 ASSAY OF VANCOMYCIN: CPT

## 2017-07-21 PROCEDURE — 85025 COMPLETE CBC W/AUTO DIFF WBC: CPT

## 2017-07-21 PROCEDURE — 21400001 HC TELEMETRY ROOM

## 2017-07-21 PROCEDURE — 63600175 PHARM REV CODE 636 W HCPCS: Performed by: EMERGENCY MEDICINE

## 2017-07-21 PROCEDURE — 25000003 PHARM REV CODE 250: Performed by: INTERNAL MEDICINE

## 2017-07-21 PROCEDURE — 80048 BASIC METABOLIC PNL TOTAL CA: CPT

## 2017-07-21 PROCEDURE — 36415 COLL VENOUS BLD VENIPUNCTURE: CPT

## 2017-07-21 PROCEDURE — 25000003 PHARM REV CODE 250: Performed by: EMERGENCY MEDICINE

## 2017-07-21 PROCEDURE — 87040 BLOOD CULTURE FOR BACTERIA: CPT

## 2017-07-21 RX ADMIN — INSULIN ASPART 3 UNITS: 100 INJECTION, SOLUTION INTRAVENOUS; SUBCUTANEOUS at 12:07

## 2017-07-21 RX ADMIN — PIPERACILLIN SODIUM AND TAZOBACTAM SODIUM 4.5 G: 4; .5 INJECTION, POWDER, LYOPHILIZED, FOR SOLUTION INTRAVENOUS at 06:07

## 2017-07-21 RX ADMIN — METOPROLOL SUCCINATE 25 MG: 25 TABLET, EXTENDED RELEASE ORAL at 08:07

## 2017-07-21 RX ADMIN — PIPERACILLIN SODIUM AND TAZOBACTAM SODIUM 4.5 G: 4; .5 INJECTION, POWDER, LYOPHILIZED, FOR SOLUTION INTRAVENOUS at 11:07

## 2017-07-21 RX ADMIN — HEPARIN SODIUM 5000 UNITS: 5000 INJECTION, SOLUTION INTRAVENOUS; SUBCUTANEOUS at 08:07

## 2017-07-21 RX ADMIN — INSULIN DETEMIR 10 UNITS: 100 INJECTION, SOLUTION SUBCUTANEOUS at 08:07

## 2017-07-21 RX ADMIN — PIPERACILLIN SODIUM AND TAZOBACTAM SODIUM 4.5 G: 4; .5 INJECTION, POWDER, LYOPHILIZED, FOR SOLUTION INTRAVENOUS at 03:07

## 2017-07-21 RX ADMIN — VANCOMYCIN HYDROCHLORIDE 1000 MG: 1 INJECTION, POWDER, LYOPHILIZED, FOR SOLUTION INTRAVENOUS at 09:07

## 2017-07-21 RX ADMIN — MICONAZOLE NITRATE: 20 CREAM TOPICAL at 08:07

## 2017-07-21 RX ADMIN — INSULIN ASPART 3 UNITS: 100 INJECTION, SOLUTION INTRAVENOUS; SUBCUTANEOUS at 08:07

## 2017-07-21 RX ADMIN — INSULIN ASPART 3 UNITS: 100 INJECTION, SOLUTION INTRAVENOUS; SUBCUTANEOUS at 05:07

## 2017-07-21 NOTE — PROGRESS NOTES
[facility] Hospital Medicine  Progress Note    Patient Name: Axel Burgos  MRN: [id number]  Patient Class: IP- Inpatient   Admission Date: 7/18/2017  Length of Stay: 2 days  Attending Physician: Leona Gracia MD  Primary Care Provider: Ahsan Guzman MD        Subjective:     Principal Problem:Cellulitis of left lower extremity    HPI:  Mr. Axel Burgos is a 72 y.o. male with essential hypertension, type 2 diabetes mellitus (HbA1c 6.4% Dec 2015), anemia of chronic disease, and mild protein malnutrition who presents to [facility] ED with complaints of confusion today.  He was observed to be driving erratically today and was pulled over by police, after which EMS was activated and thought he was in atrial flutter; he was transported to the ED here.  He denies being confused and remembers the events that transpired today.  He pulled out in front of a police car which led to his being pulled over.  He had just come from his PCP's office for which he was prescribed antibiotics for his left leg infection and a possible urinary tract infection.  He has been dealing with a left lower extremity wound for the past two months.  He admits to picking at it constantly and it started to get more red, swollen, and painful in the last few weeks.  He cannot recall any trauma to the area and it has not affected his ambulation.  He also complains that he constantly urinates and thinks that it's due to his prostate problems.  He denies any dysuria but does dribble a lot.  He also thinks that it's more concentrated than usual.  He has not experienced any fevers, chills, nausea, nor any vomiting, but has been feeling a bit weak lately.  His appetite has been good and he hasn't lost any weight.  He was not able to fill his medications.    He does report an episode of falling today.  He was bending over to  some change but had lost his balance and fell.  He denies any antecedent chest pain, shortness of  breath, nor palpitations, and did not sustain any head trauma nor loss of consciousness.    Hospital Course:  Pt admitted with sepsis secondary to LE cellulitis and probable bacteremia. One blood culture bottle positive GPC. On vanc and rocephin. Repeat blood cultures pending.     Interval History: *pt has no complaint no acute events     Review of Systems   Constitutional: Negative for activity change, appetite change, chills, diaphoresis, fatigue, fever and unexpected weight change.   HENT: Negative.    Eyes: Negative.    Respiratory: Negative for cough, chest tightness, shortness of breath and wheezing.    Cardiovascular: Negative for chest pain, palpitations and leg swelling.   Gastrointestinal: Negative for abdominal distention, abdominal pain, blood in stool, constipation, diarrhea, nausea and vomiting.   Genitourinary: Positive for frequency. Negative for dysuria, hematuria and urgency.   Musculoskeletal: Negative.    Skin:        Left leg pain, swelling, and redness   Neurological: Negative for dizziness, seizures, syncope, weakness and light-headedness.   Psychiatric/Behavioral: Negative.      Objective:     Vital Signs (Most Recent):  Temp: 97.9 °F (36.6 °C) (07/20/17 2000)  Pulse: 68 (07/20/17 2000)  Resp: 18 (07/20/17 2000)  BP: (!) 142/65 (07/20/17 2000)  SpO2: 98 % (07/20/17 2000) Vital Signs (24h Range):  Temp:  [97.8 °F (36.6 °C)-98.7 °F (37.1 °C)] 97.9 °F (36.6 °C)  Pulse:  [63-75] 68  Resp:  [8-18] 18  SpO2:  [97 %-99 %] 98 %  BP: ()/(58-73) 142/65     Weight: 90.3 kg (199 lb)  Body mass index is 25.55 kg/m².    Intake/Output Summary (Last 24 hours) at 07/20/17 2117  Last data filed at 07/20/17 1700   Gross per 24 hour   Intake          2226.67 ml   Output             1100 ml   Net          1126.67 ml      Physical Exam   Constitutional: He is oriented to person, place, and time. He appears well-developed and well-nourished. No distress.   HENT:   Head: Normocephalic and atraumatic.   Right  Ear: External ear normal.   Left Ear: External ear normal.   Nose: Nose normal.   Eyes: Right eye exhibits no discharge. Left eye exhibits no discharge.   Neck: Normal range of motion.   Cardiovascular: Normal rate, regular rhythm, normal heart sounds and intact distal pulses.  Exam reveals no gallop and no friction rub.    No murmur heard.  Pulmonary/Chest: Effort normal and breath sounds normal. No respiratory distress. He has no wheezes. He has no rales. He exhibits no tenderness.   Abdominal: Soft. Bowel sounds are normal. He exhibits no distension. There is no tenderness. There is no rebound and no guarding.   Musculoskeletal: Normal range of motion. He exhibits edema.   Neurological: He is alert and oriented to person, place, and time.   Skin: Skin is warm. Rash noted. He is not diaphoretic. There is erythema.   Circumferential erythema to the lower left leg with edema and a wound to the medial aspect of his foot, no drainage   Psychiatric: He has a normal mood and affect. His behavior is normal. Judgment and thought content normal.   Nursing note and vitals reviewed.      Significant Labs:   Blood Culture: No results for input(s): LABBLOO in the last 48 hours.  BMP:   Recent Labs  Lab 07/19/17  0515   *   *   K 3.9      CO2 26   BUN 34*   CREATININE 1.8*   CALCIUM 8.7   MG 2.0     CBC:   Recent Labs  Lab 07/19/17  0515   WBC 16.63*   HGB 12.2*   HCT 36.1*   *     POCT Glucose:   Recent Labs  Lab 07/20/17  0734 07/20/17  1106 07/20/17  1658   POCTGLUCOSE 132* 132* 123*     Urine Culture: No results for input(s): LABURIN in the last 48 hours.    Significant Imaging: I have reviewed all pertinent imaging results/findings within the past 24 hours.    Assessment/Plan:      Severe sepsis    This patient meets criteria for severe sepsis given fevers, tachycardia, tachypnea, leukocytosis, right lower extremity cellulitis, presumed urinary tract infection, and acute renal failure.  Blood and  urine cultures are pending; will start IV fluid hydration and broad spectrum antibiotics.        UTI (urinary tract infection)    Patient also complained of urinary symptoms and his urinalysis is significant for a specific gravity of 1.025, hazy appearance, 8 WBCs, and moderate bacteria.  Urine cultures are pending and he has been started on empiric antibiotic therapy.        Mild protein malnutrition    Will provide protein supplementation with Boost Glucose.        Anemia of chronic disease    The patient's H/H is stable and consistent with previous laboratory measurements, and the patient exhibits no signs or symptoms of acute bleeding; there is no indication for transfusion.  Will continue to monitor.        Type 2 diabetes mellitus, controlled    Well-controlled on a home regimen of metformin; will provide basal-prandial insulin along with insulin sliding scale.        Essential hypertension    Patient's blood pressure is well-controlled; will continue home regimen of metoprolol, and provide as-needed clonidine.        Acute renal failure    Patient's urinalysis is significant for a specific gravity of 1.025, 1+ protein, 1+ ketones, trace occult blood, 8 WBCs, and 8 hyaline casts.  Urine output has been stable.  Will obtain additional urine studies; provide aggressive IV fluid hydration; monitor the urine output; recheck the renal function in the morning; and avoid nephrotoxins.                      * Cellulitis of left lower extremity    Patient has a left lower extremity lesion that is suspicious for cellulitis.  He does have a fever and leukocytosis, and therefore meets criteria for sepsis.  Ultrasound of the LLE is negative for DVT.  Blood cultures  + GPC, await final culture and repeat   On vanc and zosyn with improvement            VTE Risk Mitigation         Ordered     heparin (porcine) injection 5,000 Units  Every 12 hours     Route:  Subcutaneous        07/19/17 0014     Medium Risk of VTE  Once       07/19/17 0014          Leona Gracia MD  Department of Hospital Medicine   [facility]

## 2017-07-21 NOTE — NURSING
Patient requests to retrieve money from wallet in envelope kept in safe. House supervisor called. $58 retrieved by patient. Balance written on envelope voucher  And returned to folder. Wallet with new balance returned to envelope and back to house supervisor to be placed back in safe.

## 2017-07-21 NOTE — SUBJECTIVE & OBJECTIVE
Interval History: *pt has no complaint no acute events     Review of Systems   Constitutional: Negative for activity change, appetite change, chills, diaphoresis, fatigue, fever and unexpected weight change.   HENT: Negative.    Eyes: Negative.    Respiratory: Negative for cough, chest tightness, shortness of breath and wheezing.    Cardiovascular: Negative for chest pain, palpitations and leg swelling.   Gastrointestinal: Negative for abdominal distention, abdominal pain, blood in stool, constipation, diarrhea, nausea and vomiting.   Genitourinary: Positive for frequency. Negative for dysuria, hematuria and urgency.   Musculoskeletal: Negative.    Skin:        Left leg pain, swelling, and redness   Neurological: Negative for dizziness, seizures, syncope, weakness and light-headedness.   Psychiatric/Behavioral: Negative.      Objective:     Vital Signs (Most Recent):  Temp: 97.9 °F (36.6 °C) (07/20/17 2000)  Pulse: 68 (07/20/17 2000)  Resp: 18 (07/20/17 2000)  BP: (!) 142/65 (07/20/17 2000)  SpO2: 98 % (07/20/17 2000) Vital Signs (24h Range):  Temp:  [97.8 °F (36.6 °C)-98.7 °F (37.1 °C)] 97.9 °F (36.6 °C)  Pulse:  [63-75] 68  Resp:  [8-18] 18  SpO2:  [97 %-99 %] 98 %  BP: ()/(58-73) 142/65     Weight: 90.3 kg (199 lb)  Body mass index is 25.55 kg/m².    Intake/Output Summary (Last 24 hours) at 07/20/17 2117  Last data filed at 07/20/17 1700   Gross per 24 hour   Intake          2226.67 ml   Output             1100 ml   Net          1126.67 ml      Physical Exam   Constitutional: He is oriented to person, place, and time. He appears well-developed and well-nourished. No distress.   HENT:   Head: Normocephalic and atraumatic.   Right Ear: External ear normal.   Left Ear: External ear normal.   Nose: Nose normal.   Eyes: Right eye exhibits no discharge. Left eye exhibits no discharge.   Neck: Normal range of motion.   Cardiovascular: Normal rate, regular rhythm, normal heart sounds and intact distal pulses.  Exam  reveals no gallop and no friction rub.    No murmur heard.  Pulmonary/Chest: Effort normal and breath sounds normal. No respiratory distress. He has no wheezes. He has no rales. He exhibits no tenderness.   Abdominal: Soft. Bowel sounds are normal. He exhibits no distension. There is no tenderness. There is no rebound and no guarding.   Musculoskeletal: Normal range of motion. He exhibits edema.   Neurological: He is alert and oriented to person, place, and time.   Skin: Skin is warm. Rash noted. He is not diaphoretic. There is erythema.   Circumferential erythema to the lower left leg with edema and a wound to the medial aspect of his foot, no drainage   Psychiatric: He has a normal mood and affect. His behavior is normal. Judgment and thought content normal.   Nursing note and vitals reviewed.      Significant Labs:   Blood Culture: No results for input(s): LABBLOO in the last 48 hours.  BMP:   Recent Labs  Lab 07/19/17  0515   *   *   K 3.9      CO2 26   BUN 34*   CREATININE 1.8*   CALCIUM 8.7   MG 2.0     CBC:   Recent Labs  Lab 07/19/17  0515   WBC 16.63*   HGB 12.2*   HCT 36.1*   *     POCT Glucose:   Recent Labs  Lab 07/20/17  0734 07/20/17  1106 07/20/17  1658   POCTGLUCOSE 132* 132* 123*     Urine Culture: No results for input(s): LABURIN in the last 48 hours.    Significant Imaging: I have reviewed all pertinent imaging results/findings within the past 24 hours.

## 2017-07-21 NOTE — HOSPITAL COURSE
Pt admitted with sepsis secondary to LE cellulitis. One blood culture bottle positive GPC. On vanc and zosyn. Repeat blood cultures negative. Likely contaminant on first culture. LE improving. Continue IV abx. Increased IV Vanc 1250mg. Sepsis resolved. Cr is stable.  Recommending HH. Wound care consult for LE cracked dry skin and healing wounds.     Pt is ambulating with PT. Switched to augmentin to complete 14 day course. He continues to work and on feet most of the day and asked that he take some time off so cellulitis can heal. Follow up PCP to guerrero CU[facility].

## 2017-07-21 NOTE — ASSESSMENT & PLAN NOTE
Patient has a left lower extremity lesion that is suspicious for cellulitis.  He does have a fever and leukocytosis, and therefore meets criteria for sepsis.  Ultrasound of the LLE is negative for DVT.  Blood cultures  + GPC, await final culture and repeat   On vanc and zosyn with improvement

## 2017-07-21 NOTE — PLAN OF CARE
07/21/17 1506   Discharge Reassessment   Assessment Type Discharge Planning Reassessment   Can the patient answer the patient profile reliably? Yes, cognitively intact   Describe the patient's ability to walk at the present time. No restrictions   How often would a person be available to care for the patient? Whenever needed   Discharge Plan A Home with family   Discharge Plan B Home with family   Change in patient condition or support system No   Patient choice form signed by patient/caregiver N/A   Explained to the the patient/caregiver why the discharge planned changed: Yes   Involved the patient/caregiver in establishing a new discharge plan: Yes     ARIEL spoke with pt concerning advanced directives. SW provided pt with a copy of advanced directives, however, pt stated he would like to read information before he fills out paperwork. The goal for pt is to discharge home with family.

## 2017-07-22 LAB
ANION GAP SERPL CALC-SCNC: 5 MMOL/L
BACTERIA BLD CULT: NORMAL
BUN SERPL-MCNC: 22 MG/DL
CALCIUM SERPL-MCNC: 9.3 MG/DL
CHLORIDE SERPL-SCNC: 106 MMOL/L
CO2 SERPL-SCNC: 29 MMOL/L
CREAT SERPL-MCNC: 1.3 MG/DL
EST. GFR  (AFRICAN AMERICAN): >60 ML/MIN/1.73 M^2
EST. GFR  (NON AFRICAN AMERICAN): 54 ML/MIN/1.73 M^2
GLUCOSE SERPL-MCNC: 107 MG/DL
MAGNESIUM SERPL-MCNC: 2 MG/DL
POCT GLUCOSE: 105 MG/DL (ref 70–110)
POCT GLUCOSE: 127 MG/DL (ref 70–110)
POCT GLUCOSE: 163 MG/DL (ref 70–110)
POCT GLUCOSE: 203 MG/DL (ref 70–110)
POTASSIUM SERPL-SCNC: 4.5 MMOL/L
SODIUM SERPL-SCNC: 140 MMOL/L

## 2017-07-22 PROCEDURE — 25000003 PHARM REV CODE 250: Performed by: HOSPITALIST

## 2017-07-22 PROCEDURE — 83735 ASSAY OF MAGNESIUM: CPT

## 2017-07-22 PROCEDURE — 63600175 PHARM REV CODE 636 W HCPCS: Performed by: HOSPITALIST

## 2017-07-22 PROCEDURE — 25000003 PHARM REV CODE 250: Performed by: INTERNAL MEDICINE

## 2017-07-22 PROCEDURE — 36415 COLL VENOUS BLD VENIPUNCTURE: CPT

## 2017-07-22 PROCEDURE — 25000003 PHARM REV CODE 250: Performed by: EMERGENCY MEDICINE

## 2017-07-22 PROCEDURE — 63600175 PHARM REV CODE 636 W HCPCS: Performed by: EMERGENCY MEDICINE

## 2017-07-22 PROCEDURE — 21400001 HC TELEMETRY ROOM

## 2017-07-22 PROCEDURE — 80048 BASIC METABOLIC PNL TOTAL CA: CPT

## 2017-07-22 RX ADMIN — HEPARIN SODIUM 5000 UNITS: 5000 INJECTION, SOLUTION INTRAVENOUS; SUBCUTANEOUS at 09:07

## 2017-07-22 RX ADMIN — MICONAZOLE NITRATE: 20 CREAM TOPICAL at 09:07

## 2017-07-22 RX ADMIN — METOPROLOL SUCCINATE 25 MG: 25 TABLET, EXTENDED RELEASE ORAL at 09:07

## 2017-07-22 RX ADMIN — INSULIN ASPART 3 UNITS: 100 INJECTION, SOLUTION INTRAVENOUS; SUBCUTANEOUS at 09:07

## 2017-07-22 RX ADMIN — INSULIN ASPART 2 UNITS: 100 INJECTION, SOLUTION INTRAVENOUS; SUBCUTANEOUS at 05:07

## 2017-07-22 RX ADMIN — INSULIN DETEMIR 10 UNITS: 100 INJECTION, SOLUTION SUBCUTANEOUS at 09:07

## 2017-07-22 RX ADMIN — VANCOMYCIN HYDROCHLORIDE 1250 MG: 1 INJECTION, POWDER, LYOPHILIZED, FOR SOLUTION INTRAVENOUS at 09:07

## 2017-07-22 RX ADMIN — INSULIN ASPART 3 UNITS: 100 INJECTION, SOLUTION INTRAVENOUS; SUBCUTANEOUS at 05:07

## 2017-07-22 RX ADMIN — PIPERACILLIN SODIUM AND TAZOBACTAM SODIUM 4.5 G: 4; .5 INJECTION, POWDER, LYOPHILIZED, FOR SOLUTION INTRAVENOUS at 11:07

## 2017-07-22 RX ADMIN — PIPERACILLIN SODIUM AND TAZOBACTAM SODIUM 4.5 G: 4; .5 INJECTION, POWDER, LYOPHILIZED, FOR SOLUTION INTRAVENOUS at 05:07

## 2017-07-22 RX ADMIN — PIPERACILLIN SODIUM AND TAZOBACTAM SODIUM 4.5 G: 4; .5 INJECTION, POWDER, LYOPHILIZED, FOR SOLUTION INTRAVENOUS at 02:07

## 2017-07-22 NOTE — PLAN OF CARE
Problem: Diabetes, Type 2 (Adult)  Intervention: Optimize Glycemic Control   07/21/17 1915   Nutrition Interventions   Glycemic Management blood glucose monitoring;oral hydration promoted   No supplemental Coverage needed.      Problem: Fall Risk (Adult)  Intervention: Review Medications/Identify Contributors to Fall Risk   07/20/17 0513   Safety Interventions   Medication Review/Management medications reviewed     Intervention: Patient Rounds   07/22/17 0200   Safety Interventions   Patient Rounds bed in low position;bed wheels locked;call light in reach;clutter free environment maintained;ID band on;placement of personal items at bedside;toileting offered;visualized patient   Call kept at side along with urinal, non-skid socks in place.       Problem: Pressure Ulcer Risk (Carrillo Scale) (Adult,Obstetrics,Pediatric)  Intervention: Prevent/Minimize Sheer/Friction Injuries   07/18/17 3042 07/21/17 1915   Skin Interventions   Pressure Reduction Devices --  pressure-redistributing mattress utilized   Pressure Reduction Techniques --  frequent weight shift encouraged;heels elevated off bed   Positioning   Positioning/Transfer Devices pillows;in use --    Left lower extremity kept elevated on a pillow. Pt able to reposition self. No new breaks to skin, Remains without injury.      Comments: Utilized call light during shift. Received all scheduled antibiotics. SR on telemetry monitor.

## 2017-07-22 NOTE — ASSESSMENT & PLAN NOTE
This patient meets criteria for severe sepsis given fevers, tachycardia, tachypnea, leukocytosis, right lower extremity cellulitis, presumed urinary tract infection, and acute renal failure.  Blood and urine cultures are pending; will start IV fluid hydration and broad spectrum antibiotics.    Resolved

## 2017-07-22 NOTE — SUBJECTIVE & OBJECTIVE
Interval History: *no acute events, leg improving, repeat blood cultures pending    Review of Systems   Constitutional: Negative for activity change, appetite change, chills, diaphoresis, fatigue, fever and unexpected weight change.   HENT: Negative.    Eyes: Negative.    Respiratory: Negative for cough, chest tightness, shortness of breath and wheezing.    Cardiovascular: Negative for chest pain, palpitations and leg swelling.   Gastrointestinal: Negative for abdominal distention, abdominal pain, blood in stool, constipation, diarrhea, nausea and vomiting.   Genitourinary: Positive for frequency. Negative for dysuria, hematuria and urgency.   Musculoskeletal: Negative.    Skin:        Left leg pain, swelling, and redness   Neurological: Negative for dizziness, seizures, syncope, weakness and light-headedness.   Psychiatric/Behavioral: Negative.      Objective:     Vital Signs (Most Recent):  Temp: 98.4 °F (36.9 °C) (07/21/17 2000)  Pulse: 75 (07/21/17 2000)  Resp: 18 (07/21/17 2000)  BP: (!) 146/74 (07/21/17 2000)  SpO2: 95 % (07/21/17 2000) Vital Signs (24h Range):  Temp:  [97.6 °F (36.4 °C)-99.2 °F (37.3 °C)] 98.4 °F (36.9 °C)  Pulse:  [59-75] 75  Resp:  [17-18] 18  SpO2:  [95 %-100 %] 95 %  BP: (126-148)/(68-75) 146/74     Weight: 88.9 kg (196 lb)  Body mass index is 25.16 kg/m².    Intake/Output Summary (Last 24 hours) at 07/21/17 2225  Last data filed at 07/21/17 1915   Gross per 24 hour   Intake             1630 ml   Output             1450 ml   Net              180 ml      Physical Exam   Constitutional: He is oriented to person, place, and time. He appears well-developed and well-nourished. No distress.   HENT:   Head: Normocephalic and atraumatic.   Right Ear: External ear normal.   Left Ear: External ear normal.   Nose: Nose normal.   Eyes: Right eye exhibits no discharge. Left eye exhibits no discharge.   Neck: Normal range of motion.   Cardiovascular: Normal rate, regular rhythm, normal heart sounds and  intact distal pulses.  Exam reveals no gallop and no friction rub.    No murmur heard.  Pulmonary/Chest: Effort normal and breath sounds normal. No respiratory distress. He has no wheezes. He has no rales. He exhibits no tenderness.   Abdominal: Soft. Bowel sounds are normal. He exhibits no distension. There is no tenderness. There is no rebound and no guarding.   Musculoskeletal: Normal range of motion. He exhibits edema.   Neurological: He is alert and oriented to person, place, and time.   Skin: Skin is warm. Rash noted. He is not diaphoretic. There is erythema.   Circumferential erythema to the lower left leg with edema and a wound to the medial aspect of his foot, no drainage   Psychiatric: He has a normal mood and affect. His behavior is normal. Judgment and thought content normal.   Nursing note and vitals reviewed.      Significant Labs:   Blood Culture:   Recent Labs  Lab 07/21/17  0556 07/21/17  0602   LABBLOO No Growth to date No Growth to date     BMP:   Recent Labs  Lab 07/21/17  0556         K 4.1      CO2 29   BUN 25*   CREATININE 1.4   CALCIUM 9.0     CBC:   Recent Labs  Lab 07/21/17  0556   WBC 7.67   HGB 11.8*   HCT 36.0*          Significant Imaging: I have reviewed all pertinent imaging results/findings within the past 24 hours.

## 2017-07-22 NOTE — PLAN OF CARE
Problem: Diabetes, Type 2 (Adult)  Goal: Signs and Symptoms of Listed Potential Problems Will be Absent, Minimized or Managed (Diabetes, Type 2)  Signs and symptoms of listed potential problems will be absent, minimized or managed by discharge/transition of care (reference Diabetes, Type 2 (Adult) CPG).   Outcome: Ongoing (interventions implemented as appropriate)   07/21/17 2006   Diabetes, Type 2   Problems Assessed (Type 2 Diabetes) all       Problem: Fall Risk (Adult)  Goal: Identify Related Risk Factors and Signs and Symptoms  Related risk factors and signs and symptoms are identified upon initiation of Human Response Clinical Practice Guideline (CPG)   Outcome: Ongoing (interventions implemented as appropriate)   07/20/17 1817 07/21/17 2006   Fall Risk   Related Risk Factors (Fall Risk) history of falls;bladder function altered;polypharmacy;age-related changes;environment unfamiliar --    Signs and Symptoms (Fall Risk) --  presence of risk factors     Goal: Absence of Falls  Patient will demonstrate the desired outcomes by discharge/transition of care.   Outcome: Ongoing (interventions implemented as appropriate)   07/21/17 2006   Fall Risk (Adult)   Absence of Falls making progress toward outcome       Problem: Skin and Soft Tissue Infection (Adult)  Goal: Signs and Symptoms of Listed Potential Problems Will be Absent, Minimized or Managed (Skin and Soft Tissue Infection)  Signs and symptoms of listed potential problems will be absent, minimized or managed by discharge/transition of care (reference Skin and Soft Tissue Infection (Adult) CPG).   Outcome: Ongoing (interventions implemented as appropriate)   07/21/17 2006   Skin and Soft Tissue Infection   Problems Assessed (Skin and Soft Tissue Infection) all   Problems Present (Skin and Soft Tissue Infection) infection progression     Pt remains pain-free and afebrile this day. Repeat blood cultures drawn. LLE warm, reddened and mildly edemetous, patient  receiving IV zosyn and vanc, and topical antifungal. Skin with no new breakdown. Patient remains free of falls this shift, verbalizes understanding to call for assist with walking. Patient able to reposition self and make needs known.

## 2017-07-22 NOTE — PLAN OF CARE
Problem: Patient Care Overview  Goal: Plan of Care Review  Outcome: Ongoing (interventions implemented as appropriate)  No falls this shift bed kept in low position call light and phone remain within reach bed alarm remain active. Patient able to reposition self in bed without assist.     Admit dx: cellulitis to left lower extremity    Left lower extremity warm and red mild to moderate edema noted.   Patient currently on zosyn q8hrs and IV vanc q24hrs.   7/21/17 vanc trough 8.4 therefore IV vanc increased to 1,250 mg from 1000mg IV q24hrs.   Admit 7/18/17 WBC 27.45 as of 7/21/17 WBC 7.67  Repeat blood culture collected 7/21/17 no growth to date 7/22/17 7/18/17 blood culture collected no growth to date 7/21/17 7/19/17 US of LLE: No definite signs for DVT.

## 2017-07-22 NOTE — PROGRESS NOTES
Notified Dr. Gracia about 9 beat run of V tach this am. Pt asymptomatic VS along with last K and Mg levels reported. MD ordered BMP and Mg level to be drawn. Orders initiated.

## 2017-07-22 NOTE — ASSESSMENT & PLAN NOTE
Patient has a left lower extremity lesion that is suspicious for cellulitis.  He does have a fever and leukocytosis, and therefore meets criteria for sepsis.  Ultrasound of the LLE is negative for DVT.  Blood cultures  + GPC,repeat blood culture negative   Increase vanc 1250mg   On vanc and zosyn with improvement    Wound care consult for chronic wounds to LE

## 2017-07-22 NOTE — SUBJECTIVE & OBJECTIVE
Interval History: pt ambulating with PT, recommending rollator    Review of Systems   Constitutional: Negative for activity change, appetite change, chills, diaphoresis, fatigue, fever and unexpected weight change.   HENT: Negative.    Eyes: Negative.    Respiratory: Negative for cough, chest tightness, shortness of breath and wheezing.    Cardiovascular: Negative for chest pain, palpitations and leg swelling.   Gastrointestinal: Negative for abdominal distention, abdominal pain, blood in stool, constipation, diarrhea, nausea and vomiting.   Genitourinary: Positive for frequency. Negative for dysuria, hematuria and urgency.   Musculoskeletal: Negative.    Skin:        Left leg pain, swelling, and redness   Neurological: Negative for dizziness, seizures, syncope, weakness and light-headedness.   Psychiatric/Behavioral: Negative.      Objective:     Vital Signs (Most Recent):  Temp: 98.4 °F (36.9 °C) (07/22/17 1245)  Pulse: 70 (07/22/17 1245)  Resp: 20 (07/22/17 1245)  BP: 122/75 (07/22/17 1245)  SpO2: 98 % (07/22/17 1245) Vital Signs (24h Range):  Temp:  [97.6 °F (36.4 °C)-98.4 °F (36.9 °C)] 98.4 °F (36.9 °C)  Pulse:  [56-75] 70  Resp:  [17-20] 20  SpO2:  [94 %-99 %] 98 %  BP: ()/(54-79) 122/75     Weight: 88.9 kg (196 lb)  Body mass index is 25.16 kg/m².    Intake/Output Summary (Last 24 hours) at 07/22/17 1528  Last data filed at 07/22/17 1200   Gross per 24 hour   Intake              930 ml   Output             1950 ml   Net            -1020 ml      Physical Exam   Constitutional: He is oriented to person, place, and time. He appears well-developed and well-nourished. No distress.   HENT:   Head: Normocephalic and atraumatic.   Right Ear: External ear normal.   Left Ear: External ear normal.   Nose: Nose normal.   Eyes: Right eye exhibits no discharge. Left eye exhibits no discharge.   Neck: Normal range of motion.   Cardiovascular: Normal rate, regular rhythm, normal heart sounds and intact distal pulses.   Exam reveals no gallop and no friction rub.    No murmur heard.  Pulmonary/Chest: Effort normal and breath sounds normal. No respiratory distress. He has no wheezes. He has no rales. He exhibits no tenderness.   Abdominal: Soft. Bowel sounds are normal. He exhibits no distension. There is no tenderness. There is no rebound and no guarding.   Musculoskeletal: Normal range of motion. He exhibits edema.   Neurological: He is alert and oriented to person, place, and time.   Skin: Skin is warm. Rash noted. He is not diaphoretic. There is erythema.   Circumferential erythema to the lower left leg with edema and a wound to the medial aspect of his foot, no drainage   Psychiatric: He has a normal mood and affect. His behavior is normal. Judgment and thought content normal.   Nursing note and vitals reviewed.      Significant Labs:   Blood Culture:   Recent Labs  Lab 07/21/17  0556 07/21/17  0602   LABBLOO No Growth to date  No Growth to date No Growth to date  No Growth to date     BMP:   Recent Labs  Lab 07/22/17  0718         K 4.5      CO2 29   BUN 22   CREATININE 1.3   CALCIUM 9.3   MG 2.0     CBC:   Recent Labs  Lab 07/21/17  0556   WBC 7.67   HGB 11.8*   HCT 36.0*          Significant Imaging: I have reviewed all pertinent imaging results/findings within the past 24 hours.

## 2017-07-22 NOTE — PROGRESS NOTES
[facility] Hospital Medicine  Progress Note    Patient Name: Axel Burgos  MRN: [id number]  Patient Class: IP- Inpatient   Admission Date: 7/18/2017  Length of Stay: 3 days  Attending Physician: Leona Gracia MD  Primary Care Provider: Ahsan Guzman MD        Subjective:     Principal Problem:Cellulitis of left lower extremity    HPI:  Mr. Axel Burgos is a 72 y.o. male with essential hypertension, type 2 diabetes mellitus (HbA1c 6.4% Dec 2015), anemia of chronic disease, and mild protein malnutrition who presents to [facility] ED with complaints of confusion today.  He was observed to be driving erratically today and was pulled over by police, after which EMS was activated and thought he was in atrial flutter; he was transported to the ED here.  He denies being confused and remembers the events that transpired today.  He pulled out in front of a police car which led to his being pulled over.  He had just come from his PCP's office for which he was prescribed antibiotics for his left leg infection and a possible urinary tract infection.  He has been dealing with a left lower extremity wound for the past two months.  He admits to picking at it constantly and it started to get more red, swollen, and painful in the last few weeks.  He cannot recall any trauma to the area and it has not affected his ambulation.  He also complains that he constantly urinates and thinks that it's due to his prostate problems.  He denies any dysuria but does dribble a lot.  He also thinks that it's more concentrated than usual.  He has not experienced any fevers, chills, nausea, nor any vomiting, but has been feeling a bit weak lately.  His appetite has been good and he hasn't lost any weight.  He was not able to fill his medications.    He does report an episode of falling today.  He was bending over to  some change but had lost his balance and fell.  He denies any antecedent chest pain, shortness of  breath, nor palpitations, and did not sustain any head trauma nor loss of consciousness.    Hospital Course:  Pt admitted with sepsis secondary to LE cellulitis and probable bacteremia. One blood culture bottle positive GPC. On vanc and rocephin. Repeat blood cultures pending. Likely contaminant on first culture. LE improving. Continue IV abx.     Interval History: *no acute events, leg improving, repeat blood cultures pending    Review of Systems   Constitutional: Negative for activity change, appetite change, chills, diaphoresis, fatigue, fever and unexpected weight change.   HENT: Negative.    Eyes: Negative.    Respiratory: Negative for cough, chest tightness, shortness of breath and wheezing.    Cardiovascular: Negative for chest pain, palpitations and leg swelling.   Gastrointestinal: Negative for abdominal distention, abdominal pain, blood in stool, constipation, diarrhea, nausea and vomiting.   Genitourinary: Positive for frequency. Negative for dysuria, hematuria and urgency.   Musculoskeletal: Negative.    Skin:        Left leg pain, swelling, and redness   Neurological: Negative for dizziness, seizures, syncope, weakness and light-headedness.   Psychiatric/Behavioral: Negative.      Objective:     Vital Signs (Most Recent):  Temp: 98.4 °F (36.9 °C) (07/21/17 2000)  Pulse: 75 (07/21/17 2000)  Resp: 18 (07/21/17 2000)  BP: (!) 146/74 (07/21/17 2000)  SpO2: 95 % (07/21/17 2000) Vital Signs (24h Range):  Temp:  [97.6 °F (36.4 °C)-99.2 °F (37.3 °C)] 98.4 °F (36.9 °C)  Pulse:  [59-75] 75  Resp:  [17-18] 18  SpO2:  [95 %-100 %] 95 %  BP: (126-148)/(68-75) 146/74     Weight: 88.9 kg (196 lb)  Body mass index is 25.16 kg/m².    Intake/Output Summary (Last 24 hours) at 07/21/17 2225  Last data filed at 07/21/17 1915   Gross per 24 hour   Intake             1630 ml   Output             1450 ml   Net              180 ml      Physical Exam   Constitutional: He is oriented to person, place, and time. He appears  well-developed and well-nourished. No distress.   HENT:   Head: Normocephalic and atraumatic.   Right Ear: External ear normal.   Left Ear: External ear normal.   Nose: Nose normal.   Eyes: Right eye exhibits no discharge. Left eye exhibits no discharge.   Neck: Normal range of motion.   Cardiovascular: Normal rate, regular rhythm, normal heart sounds and intact distal pulses.  Exam reveals no gallop and no friction rub.    No murmur heard.  Pulmonary/Chest: Effort normal and breath sounds normal. No respiratory distress. He has no wheezes. He has no rales. He exhibits no tenderness.   Abdominal: Soft. Bowel sounds are normal. He exhibits no distension. There is no tenderness. There is no rebound and no guarding.   Musculoskeletal: Normal range of motion. He exhibits edema.   Neurological: He is alert and oriented to person, place, and time.   Skin: Skin is warm. Rash noted. He is not diaphoretic. There is erythema.   Circumferential erythema to the lower left leg with edema and a wound to the medial aspect of his foot, no drainage   Psychiatric: He has a normal mood and affect. His behavior is normal. Judgment and thought content normal.   Nursing note and vitals reviewed.      Significant Labs:   Blood Culture:   Recent Labs  Lab 07/21/17  0556 07/21/17  0602   LABBLOO No Growth to date No Growth to date     BMP:   Recent Labs  Lab 07/21/17  0556         K 4.1      CO2 29   BUN 25*   CREATININE 1.4   CALCIUM 9.0     CBC:   Recent Labs  Lab 07/21/17  0556   WBC 7.67   HGB 11.8*   HCT 36.0*          Significant Imaging: I have reviewed all pertinent imaging results/findings within the past 24 hours.    Assessment/Plan:      Severe sepsis    This patient meets criteria for severe sepsis given fevers, tachycardia, tachypnea, leukocytosis, right lower extremity cellulitis, presumed urinary tract infection, and acute renal failure.  Blood and urine cultures are pending; will start IV fluid  hydration and broad spectrum antibiotics.        UTI (urinary tract infection)    Patient also complained of urinary symptoms and his urinalysis is significant for a specific gravity of 1.025, hazy appearance, 8 WBCs, and moderate bacteria.  Urine cultures are pending and he has been started on empiric antibiotic therapy.        Mild protein malnutrition    Will provide protein supplementation with Boost Glucose.        Anemia of chronic disease    The patient's H/H is stable and consistent with previous laboratory measurements, and the patient exhibits no signs or symptoms of acute bleeding; there is no indication for transfusion.  Will continue to monitor.        Type 2 diabetes mellitus, controlled    Well-controlled on a home regimen of metformin; will provide basal-prandial insulin along with insulin sliding scale.        Essential hypertension    Patient's blood pressure is well-controlled; will continue home regimen of metoprolol, and provide as-needed clonidine.        Acute renal failure    Patient's urinalysis is significant for a specific gravity of 1.025, 1+ protein, 1+ ketones, trace occult blood, 8 WBCs, and 8 hyaline casts.  Urine output has been stable.  Will obtain additional urine studies; provide aggressive IV fluid hydration; monitor the urine output; recheck the renal function in the morning; and avoid nephrotoxins.        Chronic venous insufficiency              * Cellulitis of left lower extremity    Patient has a left lower extremity lesion that is suspicious for cellulitis.  He does have a fever and leukocytosis, and therefore meets criteria for sepsis.  Ultrasound of the LLE is negative for DVT.  Blood cultures  + GPC, await final culture and repeat   On vanc and zosyn with improvement            VTE Risk Mitigation         Ordered     heparin (porcine) injection 5,000 Units  Every 12 hours     Route:  Subcutaneous        07/19/17 0014     Medium Risk of VTE  Once      07/19/17 0014           Leona Gracia MD  Department of Hospital Medicine   [facility]

## 2017-07-22 NOTE — ASSESSMENT & PLAN NOTE
Patient also complained of urinary symptoms and his urinalysis is significant for a specific gravity of 1.025, hazy appearance, 8 WBCs, and moderate bacteria.    Urine culture negative

## 2017-07-23 LAB
ANION GAP SERPL CALC-SCNC: 8 MMOL/L
BACTERIA BLD CULT: NORMAL
BUN SERPL-MCNC: 25 MG/DL
CALCIUM SERPL-MCNC: 9.1 MG/DL
CHLORIDE SERPL-SCNC: 105 MMOL/L
CO2 SERPL-SCNC: 27 MMOL/L
CREAT SERPL-MCNC: 1.2 MG/DL
EST. GFR  (AFRICAN AMERICAN): >60 ML/MIN/1.73 M^2
EST. GFR  (NON AFRICAN AMERICAN): 59 ML/MIN/1.73 M^2
GLUCOSE SERPL-MCNC: 127 MG/DL
POCT GLUCOSE: 119 MG/DL (ref 70–110)
POCT GLUCOSE: 142 MG/DL (ref 70–110)
POCT GLUCOSE: 144 MG/DL (ref 70–110)
POCT GLUCOSE: 213 MG/DL (ref 70–110)
POTASSIUM SERPL-SCNC: 4 MMOL/L
SODIUM SERPL-SCNC: 140 MMOL/L

## 2017-07-23 PROCEDURE — 25000003 PHARM REV CODE 250: Performed by: EMERGENCY MEDICINE

## 2017-07-23 PROCEDURE — 36415 COLL VENOUS BLD VENIPUNCTURE: CPT

## 2017-07-23 PROCEDURE — 63600175 PHARM REV CODE 636 W HCPCS: Performed by: HOSPITALIST

## 2017-07-23 PROCEDURE — 25000003 PHARM REV CODE 250: Performed by: HOSPITALIST

## 2017-07-23 PROCEDURE — 25000003 PHARM REV CODE 250: Performed by: INTERNAL MEDICINE

## 2017-07-23 PROCEDURE — 21400001 HC TELEMETRY ROOM

## 2017-07-23 PROCEDURE — G8978 MOBILITY CURRENT STATUS: HCPCS | Mod: CJ | Performed by: PHYSICAL THERAPIST

## 2017-07-23 PROCEDURE — 63600175 PHARM REV CODE 636 W HCPCS: Performed by: EMERGENCY MEDICINE

## 2017-07-23 PROCEDURE — 97162 PT EVAL MOD COMPLEX 30 MIN: CPT | Performed by: PHYSICAL THERAPIST

## 2017-07-23 PROCEDURE — 80048 BASIC METABOLIC PNL TOTAL CA: CPT

## 2017-07-23 PROCEDURE — G8979 MOBILITY GOAL STATUS: HCPCS | Mod: CI | Performed by: PHYSICAL THERAPIST

## 2017-07-23 RX ADMIN — PIPERACILLIN SODIUM AND TAZOBACTAM SODIUM 4.5 G: 4; .5 INJECTION, POWDER, LYOPHILIZED, FOR SOLUTION INTRAVENOUS at 02:07

## 2017-07-23 RX ADMIN — HEPARIN SODIUM 5000 UNITS: 5000 INJECTION, SOLUTION INTRAVENOUS; SUBCUTANEOUS at 09:07

## 2017-07-23 RX ADMIN — INSULIN ASPART 3 UNITS: 100 INJECTION, SOLUTION INTRAVENOUS; SUBCUTANEOUS at 09:07

## 2017-07-23 RX ADMIN — PIPERACILLIN SODIUM AND TAZOBACTAM SODIUM 4.5 G: 4; .5 INJECTION, POWDER, LYOPHILIZED, FOR SOLUTION INTRAVENOUS at 07:07

## 2017-07-23 RX ADMIN — INSULIN ASPART 1 UNITS: 100 INJECTION, SOLUTION INTRAVENOUS; SUBCUTANEOUS at 09:07

## 2017-07-23 RX ADMIN — INSULIN DETEMIR 10 UNITS: 100 INJECTION, SOLUTION SUBCUTANEOUS at 09:07

## 2017-07-23 RX ADMIN — PIPERACILLIN SODIUM AND TAZOBACTAM SODIUM 4.5 G: 4; .5 INJECTION, POWDER, LYOPHILIZED, FOR SOLUTION INTRAVENOUS at 11:07

## 2017-07-23 RX ADMIN — INSULIN ASPART 3 UNITS: 100 INJECTION, SOLUTION INTRAVENOUS; SUBCUTANEOUS at 05:07

## 2017-07-23 RX ADMIN — INSULIN ASPART 3 UNITS: 100 INJECTION, SOLUTION INTRAVENOUS; SUBCUTANEOUS at 01:07

## 2017-07-23 RX ADMIN — VANCOMYCIN HYDROCHLORIDE 1250 MG: 1 INJECTION, POWDER, LYOPHILIZED, FOR SOLUTION INTRAVENOUS at 09:07

## 2017-07-23 RX ADMIN — METOPROLOL SUCCINATE 25 MG: 25 TABLET, EXTENDED RELEASE ORAL at 09:07

## 2017-07-23 RX ADMIN — MICONAZOLE NITRATE: 20 CREAM TOPICAL at 09:07

## 2017-07-23 NOTE — PLAN OF CARE
Problem: Physical Therapy Goal  Goal: Physical Therapy Goal  Goals to be met by: 2017    Patient will increase functional independence with mobility by performin. Sit to stand transfer with [place]  2. Gait  x 350 feet with Modified [place] using Rolling Walker.    Recommend: Rollator and HHPT at time of discharge.      Lamonte Clark, PT  2017

## 2017-07-23 NOTE — PROGRESS NOTES
[facility] Hospital Medicine  Progress Note    Patient Name: Axel Burgos  MRN: [id number]  Patient Class: IP- Inpatient   Admission Date: 7/18/2017  Length of Stay: 5 days  Attending Physician: Leona Gracia MD  Primary Care Provider: Ahsan Guzman MD        Subjective:     Principal Problem:Cellulitis of left lower extremity    HPI:  Mr. Axel Burgos is a 72 y.o. male with essential hypertension, type 2 diabetes mellitus (HbA1c 6.4% Dec 2015), anemia of chronic disease, and mild protein malnutrition who presents to [facility] ED with complaints of confusion today.  He was observed to be driving erratically today and was pulled over by police, after which EMS was activated and thought he was in atrial flutter; he was transported to the ED here.  He denies being confused and remembers the events that transpired today.  He pulled out in front of a police car which led to his being pulled over.  He had just come from his PCP's office for which he was prescribed antibiotics for his left leg infection and a possible urinary tract infection.  He has been dealing with a left lower extremity wound for the past two months.  He admits to picking at it constantly and it started to get more red, swollen, and painful in the last few weeks.  He cannot recall any trauma to the area and it has not affected his ambulation.  He also complains that he constantly urinates and thinks that it's due to his prostate problems.  He denies any dysuria but does dribble a lot.  He also thinks that it's more concentrated than usual.  He has not experienced any fevers, chills, nausea, nor any vomiting, but has been feeling a bit weak lately.  His appetite has been good and he hasn't lost any weight.  He was not able to fill his medications.    He does report an episode of falling today.  He was bending over to  some change but had lost his balance and fell.  He denies any antecedent chest pain, shortness of  breath, nor palpitations, and did not sustain any head trauma nor loss of consciousness.    Hospital Course:  Pt admitted with sepsis secondary to LE cellulitis. One blood culture bottle positive GPC. On vanc and zosyn. Repeat blood cultures negative. Likely contaminant on first culture. LE improving. Continue IV abx. Increased IV Vanc 1250mg. Sepsis resolved. Cr is stable. Start PT. Recommending HH. Wound care consult for LE cracked dry skin and healing wounds.     Interval History: pt ambulating with PT, recommending rollator    Review of Systems   Constitutional: Negative for activity change, appetite change, chills, diaphoresis, fatigue, fever and unexpected weight change.   HENT: Negative.    Eyes: Negative.    Respiratory: Negative for cough, chest tightness, shortness of breath and wheezing.    Cardiovascular: Negative for chest pain, palpitations and leg swelling.   Gastrointestinal: Negative for abdominal distention, abdominal pain, blood in stool, constipation, diarrhea, nausea and vomiting.   Genitourinary: Positive for frequency. Negative for dysuria, hematuria and urgency.   Musculoskeletal: Negative.    Skin:        Left leg pain, swelling, and redness   Neurological: Negative for dizziness, seizures, syncope, weakness and light-headedness.   Psychiatric/Behavioral: Negative.      Objective:     Vital Signs (Most Recent):  Temp: 98.4 °F (36.9 °C) (07/22/17 1245)  Pulse: 70 (07/22/17 1245)  Resp: 20 (07/22/17 1245)  BP: 122/75 (07/22/17 1245)  SpO2: 98 % (07/22/17 1245) Vital Signs (24h Range):  Temp:  [97.6 °F (36.4 °C)-98.4 °F (36.9 °C)] 98.4 °F (36.9 °C)  Pulse:  [56-75] 70  Resp:  [17-20] 20  SpO2:  [94 %-99 %] 98 %  BP: ()/(54-79) 122/75     Weight: 88.9 kg (196 lb)  Body mass index is 25.16 kg/m².    Intake/Output Summary (Last 24 hours) at 07/22/17 1528  Last data filed at 07/22/17 1200   Gross per 24 hour   Intake              930 ml   Output             1950 ml   Net            -1020 ml       Physical Exam   Constitutional: He is oriented to person, place, and time. He appears well-developed and well-nourished. No distress.   HENT:   Head: Normocephalic and atraumatic.   Right Ear: External ear normal.   Left Ear: External ear normal.   Nose: Nose normal.   Eyes: Right eye exhibits no discharge. Left eye exhibits no discharge.   Neck: Normal range of motion.   Cardiovascular: Normal rate, regular rhythm, normal heart sounds and intact distal pulses.  Exam reveals no gallop and no friction rub.    No murmur heard.  Pulmonary/Chest: Effort normal and breath sounds normal. No respiratory distress. He has no wheezes. He has no rales. He exhibits no tenderness.   Abdominal: Soft. Bowel sounds are normal. He exhibits no distension. There is no tenderness. There is no rebound and no guarding.   Musculoskeletal: Normal range of motion. He exhibits edema.   Neurological: He is alert and oriented to person, place, and time.   Skin: Skin is warm. Rash noted. He is not diaphoretic. There is erythema.   Circumferential erythema to the lower left leg with edema and a wound to the medial aspect of his foot, no drainage   Psychiatric: He has a normal mood and affect. His behavior is normal. Judgment and thought content normal.   Nursing note and vitals reviewed.      Significant Labs:   Blood Culture:   Recent Labs  Lab 07/21/17  0556 07/21/17  0602   LABBLOO No Growth to date  No Growth to date No Growth to date  No Growth to date     BMP:   Recent Labs  Lab 07/22/17  0718         K 4.5      CO2 29   BUN 22   CREATININE 1.3   CALCIUM 9.3   MG 2.0     CBC:   Recent Labs  Lab 07/21/17  0556   WBC 7.67   HGB 11.8*   HCT 36.0*          Significant Imaging: I have reviewed all pertinent imaging results/findings within the past 24 hours.    Assessment/Plan:      Severe sepsis    This patient meets criteria for severe sepsis given fevers, tachycardia, tachypnea, leukocytosis, right lower  extremity cellulitis, presumed urinary tract infection, and acute renal failure.  Blood and urine cultures are pending; will start IV fluid hydration and broad spectrum antibiotics.    Resolved         UTI (urinary tract infection)    Patient also complained of urinary symptoms and his urinalysis is significant for a specific gravity of 1.025, hazy appearance, 8 WBCs, and moderate bacteria.    Urine culture negative        Mild protein malnutrition    Will provide protein supplementation with Boost Glucose.        Anemia of chronic disease    The patient's H/H is stable and consistent with previous laboratory measurements, and the patient exhibits no signs or symptoms of acute bleeding; there is no indication for transfusion.  Will continue to monitor.        Type 2 diabetes mellitus, controlled    Well-controlled on a home regimen of metformin; will provide basal-prandial insulin along with insulin sliding scale.        Essential hypertension    Patient's blood pressure is well-controlled; will continue home regimen of metoprolol, and provide as-needed clonidine.        Acute renal failure    Patient's urinalysis is significant for a specific gravity of 1.025, 1+ protein, 1+ ketones, trace occult blood, 8 WBCs, and 8 hyaline casts.  Urine output has been stable.  Will obtain additional urine studies; provide aggressive IV fluid hydration; monitor the urine output; recheck the renal function in the morning; and avoid nephrotoxins.        Chronic venous insufficiency              * Cellulitis of left lower extremity    Patient has a left lower extremity lesion that is suspicious for cellulitis.  He does have a fever and leukocytosis, and therefore meets criteria for sepsis.  Ultrasound of the LLE is negative for DVT.  Blood cultures  + GPC,repeat blood culture negative   Increase vanc 1250mg   On vanc and zosyn with improvement    Wound care consult for chronic wounds to LE          VTE Risk Mitigation          Ordered     heparin (porcine) injection 5,000 Units  Every 12 hours     Route:  Subcutaneous        07/19/17 0014     Medium Risk of VTE  Once      07/19/17 0014          Leona Gracia MD  Department of Hospital Medicine   [facility]

## 2017-07-23 NOTE — PT/OT/SLP EVAL
Physical Therapy  Evaluation    Axel Burgos   MRN: [id number]   Admitting Diagnosis: Cellulitis of left lower extremity    PT Received On: 07/23/17  PT Start Time: 0850     PT Stop Time: 0920    PT Total Time (min): 30 min       Billable Minutes:  Evaluation 30 min    Diagnosis: Cellulitis of left lower extremity    Past Medical History:   Diagnosis Date    Diabetes mellitus     Hypertension     Varicose veins       Past Surgical History:   Procedure Laterality Date    VARICOSE VEIN SURGERY         Referring physician: MD Basil  Date referred to PT: 07/22/2017    General Precautions: Standard, fall  Orthopedic Precautions: Full weight bearing   Braces: N/A            Patient History:  Lives With: significant other  Living Arrangements: house  Home Accessibility: stairs to enter home, stairs within home  Home Layout: Bedroom on 2nd floor  Number of Stairs to Enter Home: 2  Number of Stairs Within Home: 14  Stair Railings at Home: inside, present on right side  Transportation Available: car  Equipment Currently Used at Home: none  DME owned (not currently used): none    Previous Level of Function:  Ambulation Skills: independent (- pt was driving prior to hospitalization . )  Transfer Skills: independent  ADL Skills: independent  Work/Leisure Activity: independent    Subjective:  Communicated with Nurse prior to session.    Chief Complaint: leg itches  Patient goals: to return to PLOF    Pain/Comfort  Pain Rating 1: 0/10      Objective:   Patient found with: telemetry, peripheral IV     Cognitive Exam:  Oriented to: Person, Place and Situation    Follows Commands/attention: Follows one-step commands  Communication: clear/fluent  Safety awareness/insight to disability: intact    Physical Exam:  Postural examination/scapula alignment: Rounded shoulder and Head forward    Skin integrity: Visible skin intact, Dry and Cracking of (B) LE   Edema: None noted     Lower Extremity Range of Motion:  Right Lower Extremity:  WFL  Left Lower Extremity: WFL    Lower Extremity Strength:  Right Lower Extremity: WFL  Left Lower Extremity: WFL     Gross motor coordination: WFL    Functional Mobility:  Bed Mobility:  Rolling/Turning to Left: Stand by assistance, With side rail  Rolling/Turning Right: Stand by assistance, With side rail  Supine to Sit: Contact Guard Assistance, With side rail  Sit to Supine: Contact Guard Assistance, With siderail    Transfers:  Sit <> Stand Assistance: Contact Guard Assistance (- HHA with IV pole for balance. )  Sit <> Stand Assistive Device: No Assistive Device    Gait:   Gait Distance: 50' with IV pole for stability.   Assistance 1: Contact Guard Assistance  Gait Assistive Device: Hand held assist  Gait Pattern: 3-point gait  Gait Deviation(s): decreased anna, decreased step length, increased time in double stance    Balance:   Static Sit: FAIR: Maintains without assist, but unable to take any challenges   Dynamic Sit: FAIR: Cannot move trunk without losing balance  Static Stand: FAIR: Maintains without assist but unable to take challenges  Dynamic stand: FAIR: Needs CONTACT GUARD during gait    AM-PAC 6 CLICK MOBILITY  How much help from another person does this patient currently need?   1 = Unable, Total/Dependent Assistance  2 = A lot, Maximum/Moderate Assistance  3 = A little, Minimum/Contact Guard/Supervision  4 = None, Modified [place]/Independent    Turning over in bed (including adjusting bedclothes, sheets and blankets)?: 4  Sitting down on and standing up from a chair with arms (e.g., wheelchair, bedside commode, etc.): 4  Moving from lying on back to sitting on the side of the bed?: 4  Moving to and from a bed to a chair (including a wheelchair)?: 4  Need to walk in hospital room?: 3  Climbing 3-5 steps with a railing?: 2  Total Score: 21     AM-PAC Raw Score CMS G-Code Modifier Level of Impairment Assistance   6 % Total / Unable   7 - 9 CM 80 - 100% Maximal Assist   10 - 14 CL 60  - 80% Moderate Assist   15 - 19 CK 40 - 60% Moderate Assist   20 - 22 CJ 20 - 40% Minimal Assist   23 CI 1-20% SBA / CGA   24 CH 0% Independent/ Mod I     Patient left supine with all lines intact and call button in reach.    Assessment:   Axel Burgos is a 72 y.o. male with a medical diagnosis of Cellulitis of left lower extremity and presents with decrease functional independence with ADLs and ambulation distances.  Pt would benefit from skilled PT services to address established deficits in POC to return to PLOF and QOL within previous living environment.    Rehab identified problem list/impairments: Rehab identified problem list/impairments: impaired endurance, impaired functional mobilty, decreased safety awareness    Rehab potential is good.    Activity tolerance: Good    Discharge recommendations: Discharge Facility/Level Of Care Needs: home health PT     Barriers to discharge: Barriers to Discharge: Inaccessible home environment    Equipment recommendations: Equipment Needed After Discharge: rollator     GOALS:    Physical Therapy Goals        Problem: Physical Therapy Goal    Goal Priority Disciplines Outcome Goal Variances Interventions   Physical Therapy Goal     PT/OT, PT      Description:  Goals to be met by: 2017    Patient will increase functional independence with mobility by performin. Sit to stand transfer with [place]  2. Gait  x 350 feet with Modified [place] using Rolling Walker.    Recommend: Rollator and HHPT at time of discharge.                        PLAN:    Patient to be seen 5 x/week to address the above listed problems via gait training, therapeutic activities, therapeutic exercises  Plan of Care expires: 17  Plan of Care reviewed with: patient    Functional Assessment Tool Used: AM PAC  Score: 21  Functional Limitation: Mobility: Walking and moving around  Mobility: Walking and Moving Around Current Status (): CJ  Mobility: Walking and Moving Around  Goal Status (): SARBJIT Clark, PT  07/23/2017

## 2017-07-23 NOTE — PLAN OF CARE
Problem: Patient Care Overview  Goal: Plan of Care Review  Outcome: Ongoing (interventions implemented as appropriate)  No falls this shift bed kept in low position call light and phone remain within reach bed alarm remain active. Patient able to reposition self in bed without assist. Stand by assist to and from bathroom. Patient ambulated with PT/OT around [facility].      Admit dx: cellulitis to left lower extremity     Left lower extremity warm and red mild to moderate edema noted.   Patient currently on zosyn q8hrs and IV vanc q24hrs.   7/21/17 vanc trough 8.4 therefore IV vanc increased to 1,250 mg from 1000mg IV q24hrs.   Admit 7/18/17 WBC 27.45 as of 7/21/17 WBC 7.67  Repeat blood culture collected 7/21/17 no growth to date 7/22/17 7/18/17 blood culture collected no growth to date 7/21/17 7/19/17 US of LLE: No definite signs for DVT.    Blood glucose within baseline managed by schedule insulin as part.

## 2017-07-23 NOTE — PLAN OF CARE
Problem: Diabetes, Type 2 (Adult)  Intervention: Optimize Glycemic Control   07/22/17 6100   Nutrition Interventions   Glycemic Management blood glucose monitoring;carbohydrate replacement provided   Blood sugar monitored, no supplemental insulin needed.       Problem: Fall Risk (Adult)  Intervention: Patient Rounds   07/23/17 9435   Safety Interventions   Patient Rounds bed in low position;bed wheels locked;call light in reach;clutter free environment maintained;ID band on;placement of personal items at bedside;toileting offered;visualized patient   No injury during shift. Used call light when needed assitance. Ambuated to bathroom to have BM with standby assist. Yellow non-skid socks in place. No new skin issues.

## 2017-07-24 VITALS
WEIGHT: 200.88 LBS | SYSTOLIC BLOOD PRESSURE: 141 MMHG | HEART RATE: 72 BPM | RESPIRATION RATE: 18 BRPM | HEIGHT: 74 IN | TEMPERATURE: 97 F | DIASTOLIC BLOOD PRESSURE: 67 MMHG | OXYGEN SATURATION: 98 % | BODY MASS INDEX: 25.78 KG/M2

## 2017-07-24 PROBLEM — A41.9 SEVERE SEPSIS: Status: RESOLVED | Noted: 2017-07-18 | Resolved: 2017-07-24

## 2017-07-24 PROBLEM — N39.0 UTI (URINARY TRACT INFECTION): Status: RESOLVED | Noted: 2017-07-18 | Resolved: 2017-07-24

## 2017-07-24 PROBLEM — R65.20 SEVERE SEPSIS: Status: RESOLVED | Noted: 2017-07-18 | Resolved: 2017-07-24

## 2017-07-24 PROBLEM — N17.9 ACUTE RENAL FAILURE: Status: RESOLVED | Noted: 2017-07-18 | Resolved: 2017-07-24

## 2017-07-24 LAB
POCT GLUCOSE: 106 MG/DL (ref 70–110)
POCT GLUCOSE: 122 MG/DL (ref 70–110)
POCT GLUCOSE: 174 MG/DL (ref 70–110)

## 2017-07-24 PROCEDURE — G8979 MOBILITY GOAL STATUS: HCPCS | Mod: CH

## 2017-07-24 PROCEDURE — 97116 GAIT TRAINING THERAPY: CPT

## 2017-07-24 PROCEDURE — G8978 MOBILITY CURRENT STATUS: HCPCS | Mod: CH

## 2017-07-24 PROCEDURE — 63600175 PHARM REV CODE 636 W HCPCS: Performed by: EMERGENCY MEDICINE

## 2017-07-24 PROCEDURE — G8980 MOBILITY D/C STATUS: HCPCS | Mod: CH

## 2017-07-24 PROCEDURE — 25000003 PHARM REV CODE 250: Performed by: INTERNAL MEDICINE

## 2017-07-24 PROCEDURE — 25000003 PHARM REV CODE 250: Performed by: EMERGENCY MEDICINE

## 2017-07-24 PROCEDURE — 97110 THERAPEUTIC EXERCISES: CPT

## 2017-07-24 RX ORDER — AMOXICILLIN AND CLAVULANATE POTASSIUM 875; 125 MG/1; MG/1
1 TABLET, FILM COATED ORAL EVERY 12 HOURS
Qty: 18 TABLET | Refills: 0 | Status: SHIPPED | OUTPATIENT
Start: 2017-07-24 | End: 2017-08-02

## 2017-07-24 RX ORDER — METFORMIN HYDROCHLORIDE 500 MG/1
500 TABLET ORAL 2 TIMES DAILY WITH MEALS
Qty: 60 TABLET | Refills: 3 | Status: SHIPPED | OUTPATIENT
Start: 2017-07-24 | End: 2022-09-09

## 2017-07-24 RX ORDER — METOPROLOL SUCCINATE 25 MG/1
25 TABLET, EXTENDED RELEASE ORAL DAILY
Qty: 30 TABLET | Refills: 3 | Status: SHIPPED | OUTPATIENT
Start: 2017-07-24 | End: 2022-09-09

## 2017-07-24 RX ORDER — AMOXICILLIN AND CLAVULANATE POTASSIUM 875; 125 MG/1; MG/1
1 TABLET, FILM COATED ORAL EVERY 12 HOURS
Qty: 18 TABLET | Refills: 0 | Status: SHIPPED | OUTPATIENT
Start: 2017-07-24 | End: 2017-07-24

## 2017-07-24 RX ORDER — DOXYLAMINE SUCCINATE 25 MG
TABLET ORAL 2 TIMES DAILY
Refills: 0 | COMMUNITY
Start: 2017-07-24 | End: 2022-09-09

## 2017-07-24 RX ORDER — AMOXICILLIN AND CLAVULANATE POTASSIUM 875; 125 MG/1; MG/1
1 TABLET, FILM COATED ORAL EVERY 12 HOURS
Status: DISCONTINUED | OUTPATIENT
Start: 2017-07-24 | End: 2017-07-24 | Stop reason: HOSPADM

## 2017-07-24 RX ADMIN — METOPROLOL SUCCINATE 25 MG: 25 TABLET, EXTENDED RELEASE ORAL at 08:07

## 2017-07-24 RX ADMIN — PIPERACILLIN SODIUM AND TAZOBACTAM SODIUM 4.5 G: 4; .5 INJECTION, POWDER, LYOPHILIZED, FOR SOLUTION INTRAVENOUS at 06:07

## 2017-07-24 RX ADMIN — PIPERACILLIN SODIUM AND TAZOBACTAM SODIUM 4.5 G: 4; .5 INJECTION, POWDER, LYOPHILIZED, FOR SOLUTION INTRAVENOUS at 02:07

## 2017-07-24 RX ADMIN — MICONAZOLE NITRATE: 20 CREAM TOPICAL at 08:07

## 2017-07-24 RX ADMIN — INSULIN ASPART 3 UNITS: 100 INJECTION, SOLUTION INTRAVENOUS; SUBCUTANEOUS at 08:07

## 2017-07-24 RX ADMIN — HEPARIN SODIUM 5000 UNITS: 5000 INJECTION, SOLUTION INTRAVENOUS; SUBCUTANEOUS at 08:07

## 2017-07-24 RX ADMIN — INSULIN ASPART 3 UNITS: 100 INJECTION, SOLUTION INTRAVENOUS; SUBCUTANEOUS at 12:07

## 2017-07-24 NOTE — NURSING
Patient dressed and ready to leave family member is present . Called house supervisor to return patients valuables to him . Transportation called to take patient to his vehicle.

## 2017-07-24 NOTE — DISCHARGE INSTRUCTIONS
Discharge instructions have been reviewed and patient verbalized understanding. Prescriptions given to him and work excuse too. Copies of all made for his medical record.

## 2017-07-24 NOTE — PLAN OF CARE
Problem: Physical Therapy Goal  Goal: Physical Therapy Goal  Goals to be met by: 2017    Patient will increase functional independence with mobility by performin. Sit to stand transfer with [place]  2. Gait  x 350 feet with Modified [place] using Rolling Walker.         Outcome: Outcome(s) achieved Date Met: 17    Patient met goals and has no further need for PT services.

## 2017-07-24 NOTE — PROGRESS NOTES
Follow-up Information     Ahsan Guzman MD On 7/28/2017.    Specialty:  Family Medicine  Why:  Outpatient Services, PCP follow-up appointment. Patient should arrive by 11:15AM.   Contact information:  [address] [place] [address] [phone]               PLEASE BRING TO ALL FOLLOW UP APPOINTMENTS:   A COPY YOUR DISCHARGE INSTRUCTIONS, Any new MEDICINES YOU ARE CURRENTLY TAKING IN THEIR ORIGINAL BOTTLES  And IDENTIFICATION AND INSURANCE CARD     **PLEASE ARRIVE 15 MINUTES AHEAD OF SCHEDULED APPOINTMENT TIME   ++PLEASE CALL 24 HOURS IN ADVANCE IF YOU MUST RESCHEDULE YOUR APPOINTMENT DAY AND/OR TIME     Help at Home [phone]  After discharge for assistance [facility] On Call Nurse Care Line 24/7 Assistance     Things You are responsible For To Manage Your Care At Home:  1.    Getting your prescriptions filled   2.    Taking your medications as directed, DO NOT MISS ANY DOSES!  3.    Going to your follow-up doctor appointment. This is important because it  allow the doctor to monitor your progress and determine if  any changes need to made to your treatment plan.     Thank you for choosing [facility] for your care.  Please answer any calls you may receive from [facility] we want to continue to support you as you manage your healthcare needs. [facility] is happy to have the opportunity to serve you.      MARY Dahl, MSW, CSW  [phone]  Care Management

## 2017-07-24 NOTE — CONSULTS
SW to pt room to discuss discharge planning. ARIEL informed pt PT/OT has recommended HH and asked pt if he would be interested in services. According to pt, he will be staying by relatives in [place] for a week and he will not need HH. ARIEL informed pt, a rolling walker has been recommended and per pt he does not need a rolling walker. Pt declined both HH and equipment.

## 2017-07-24 NOTE — NURSING
1430- patient inquired if going home today . Told him maybe waiting on his doctor to may decision.     1602- discharge instructions given to patient on amoxicillin antibiotic take every 12 hours til completed if dose is missed don't double dose take the next scheduled dose . Dr alicea has ordered 8 days of medication to take with meals. Metformin ordered 2 times daily with meals be sure to check blood sugar level before eating . Dr alicea ordered toprol xl 25 mg hr tablet once daily. You have three refills check your blood pressure before taking this medication if your heart rate is below 50 beats per minute do not take same for pressure if systolic pressure is below 90 do not take recheck pressure again in 2 hours if still low call your primary car doctor. So three prescriptions were given to him and excuse to return to work. Patient voiced no concerns or pain.

## 2017-07-24 NOTE — NURSING
Bedside rounding report received from deneen rausch rn on patients progress and updated hand off report sheet given too. Patient assisted up to toilet had a bowel movement and voided too. Denies pain or needs.

## 2017-07-24 NOTE — PLAN OF CARE
"   07/24/17 1550   Final Note   Assessment Type Final Discharge Note   Discharge Disposition Home   Discharge planning education complete? Yes   What phone number can be called within the next 1-3 days to see how you are doing after discharge? [phone]   Hospital Follow Up  Appt(s) scheduled? Yes   Discharge plans and expectations educations in teach back method with documentation complete? Yes   Offered [facility] -- Bedside Delivery? n/a   Discharge/Hospital Encounter Summary to (non-[facility]) PCP No   Referral to Outpatient Case Management complete? No   Referral to / orders for Home Health Complete? Yes   30 day supply of medicines given at discharge, if documented non-compliance / non-adherence? No   Any social issues identified prior to discharge? No   Did you assess the readiness or willingness of the family or caregiver to support self management of care? Yes     SW to pt room to discuss discharge planning. SW informed pt PT/OT has recommended HH and asked pt if he would be interested in services. According to pt, he will be staying by relatives in [place] for a week and he will not need HH. SW informed pt, a rolling walker has been recommended and per pt he does not need a rolling walker. Pt declined both HH and equipment.     Patient / Family provided with educational information on Cellulities.  Information reviewed and placed in :My Healthcare Packet" to be brought home for patient / family  to use as resource after discharge.  Information included:  signs and symptoms such as: Difficulty or pain when moving joints, pus drainage, fever, shaking and chills, swelling, and vomiting.   SW asked pt to verbalize two signs and symptoms that warrant pt to contact PCP or get to the emergency room. According to pt, if he experience redness around the affected area or swelling he will contact PCP or get to emergency room. SW reminded patient of the importance of managing his care at home:  ARIEL " explained things pt will be responsible for to such as: getting prescriptions filled, attending follow up appointments, and taking medication as prescribed were discussed.   Teach back method used.  All questions answered.  Patient verbalized understanding of all information.      ARIEL provided pt with a copy of follow-up appointments. ARIEL explained/highlighted date, time, and location of each appointment. ARIEL provided pt with a blue folder and instructed pt to place all medical documents in blue folder. ARIEL explained to pt the nurse will provide an AVS with diagnosis and instructed pt to place in blue folder and bring to follow-up appointment. ARIEL informed pt nurse nurse Janeen pt has declined HH and equipment. ARIEL informed Janeen RN all CM needs has been met.

## 2017-07-24 NOTE — PLAN OF CARE
Problem: Diabetes, Type 2 (Adult)  Intervention: Optimize Glycemic Control   07/23/17 2136   Nutrition Interventions   Glycemic Management blood glucose monitoring;oral hydration promoted;supplemental insulin given         Problem: Skin and Soft Tissue Infection (Adult)  Intervention: Minimize/Manage Wound Associated Edema   07/23/17 2136 07/24/17 0604   Positioning   Body Position --  positioned/repositioned independently;side-lying, right   Skin Interventions   Topical Inflammation Care other (see comments)  (to feet) --      Intervention: Monitor/Manage Pain   07/20/17 0513   Safety Interventions   Medication Review/Management medications reviewed         Comments: LLE kept elevated on a pillow. Tenderness noted to certain spots. Extremity still red and swollen. Supplemental insulin given along with snack. All antibiotics administered as ordered. No new skin issues and no injury during shift. Pt doing well with using call light and urinal, both at pt's side.

## 2017-07-24 NOTE — NURSING
0811- assessment completed and plan of care discussed with the patient. No changes on telemetry. Appetite good eating right now.       0845- ate all of breakfast meal. Tolerate well. Denies pain . Iv antibiotic infusing.     1112- patient denies pain or voices any needs. No changes on telemetry . Resting in his bed.       1320- ate his lunch meal no stomach upset. Covered with scheduled insulin. No acute distress.     1510- patient up with therapy walking in hallway tolerating very well. No acute changes.

## 2017-07-24 NOTE — PT/OT/SLP PROGRESS
Physical Therapy  Treatment / Discharge    Axel Burgos   MRN: [id number]   Admitting Diagnosis: Cellulitis of left lower extremity    PT Received On: 07/24/17  PT Start Time: 1457     PT Stop Time: 1523    PT Total Time (min): 26 min       Billable Minutes:  Gait Training 13 and Therapeutic Exercise 13    Treatment Type: Treatment  PT/PTA: PT     PTA Visit Number: 0       General Precautions: Standard, fall  Orthopedic Precautions: N/A   Braces: N/A    Subjective:  Communicated with nurse Janeen prior to session.  Patient agreeable to participate in PT session.     Pain/Comfort  Pain Rating : 0/10    Objective:   Patient found with: peripheral IV, telemetry    Functional Mobility:  Bed Mobility:   Supine to Sit: Modified Independent    Transfers:  Sit <> Stand Assistance: Supervision  Sit <> Stand Assistive Device: No Assistive Device    Gait:   Gait Distance: 500ft with patient pushing his own IV pole   Assistance 1: Supervision  Gait Assistive Device:  (IV pole)  Gait Pattern: reciprocal    Balance:   Static Sit: NORMAL: No deviations seen in posture held statically  Dynamic Sit: GOOD+: Maintains balance through MAXIMAL excursions of active trunk motion  Static Stand: GOOD: Takes MODERATE challenges from all directions  Dynamic stand: GOOD-: Needs SUPERVISION only during gait and able to self right with moderate      Therapeutic Activities and Exercises:  Patient performed B LE standing marches 2x10 with minimal loss of balance but patient was able to self recover.   Patient performed B LE seated therex 2x10 for shoulder flex, elbow flex/ext, A/P, LAQ, and hip abd/add.     AM-PAC 6 CLICK MOBILITY  How much help from another person does this patient currently need?   1 = Unable, Total/Dependent Assistance  2 = A lot, Maximum/Moderate Assistance  3 = A little, Minimum/Contact Guard/Supervision  4 = None, Modified [place]/Independent    Turning over in bed (including adjusting bedclothes, sheets and blankets)?:  4  Sitting down on and standing up from a chair with arms (e.g., wheelchair, bedside commode, etc.): 4  Moving from lying on back to sitting on the side of the bed?: 4  Moving to and from a bed to a chair (including a wheelchair)?: 4  Need to walk in hospital room?: 4  Climbing 3-5 steps with a railing?: 4  Total Score: 24    AM-PAC Raw Score CMS G-Code Modifier Level of Impairment Assistance   6 % Total / Unable   7 - 9 CM 80 - 100% Maximal Assist   10 - 14 CL 60 - 80% Moderate Assist   15 - 19 CK 40 - 60% Moderate Assist   20 - 22 CJ 20 - 40% Minimal Assist   23 CI 1-20% SBA / CGA   24 CH 0% Independent/ Mod I     Patient left up in chair with all lines intact, call button in reach and nurse notified.    Assessment:  Axel Burgos is a 72 y.o. male with a medical diagnosis of Cellulitis of left lower extremity. He appears to be back at baseline and has met all goals set forth at initial evaluation. He is okay to ambulate with supervision from nursing staff. He has no further needs for PT while in the hospital. PT to sign off.     Rehab identified problem list/impairments: Rehab identified problem list/impairments: decreased safety awareness    Rehab potential is good.    Activity tolerance: Good    Discharge recommendations: Discharge Facility/Level Of Care Needs: home     Barriers to discharge: Barriers to Discharge: None (patient going to stay with family for a week so he will have assistance)    Equipment recommendations: Equipment Needed After Discharge: none     GOALS:    Physical Therapy Goals     Not on file          Multidisciplinary Problems (Resolved)        Problem: Physical Therapy Goal    Goal Priority Disciplines Outcome Goal Variances Interventions   Physical Therapy Goal   (Resolved)     PT/OT, PT Outcome(s) achieved     Description:  Goals to be met by: 2017    Patient will increase functional independence with mobility by performin. Sit to stand transfer with [place]  2.  Gait  x 350 feet with Modified [place] using Rolling Walker.                         PLAN:    Patient to be seen 5 x/week  to address the above listed problems via gait training, therapeutic activities, therapeutic exercises  Plan of Care expires: 08/05/17  Plan of Care reviewed with: patient    PT G-Codes  Functional Assessment Tool Used: AM PAC   Score: 24  Functional Limitation: Mobility: Walking and moving around  Mobility: Walking and Moving Around Current Status ():   Mobility: Walking and Moving Around Goal Status ():   Mobility: Walking and Moving Around Discharge Status (): THERESA Lovelace, PT, MOT  07/24/2017

## 2017-07-26 LAB
BACTERIA BLD CULT: NORMAL
BACTERIA BLD CULT: NORMAL

## 2017-08-07 NOTE — DISCHARGE SUMMARY
[facility] Hospital Medicine  Discharge Summary      Patient Name: Axel Burgos  MRN: [id number]  Admission Date: 7/18/2017  Hospital Length of Stay: 6 days  Discharge Date and Time: 7/24/2017  Attending Physician: Leona Gracia   Discharging Provider: Leona Gracia MD  Primary Care Provider: Ahsan Guzman MD      HPI:   Mr. Axel Burgos is a 72 y.o. male with essential hypertension, type 2 diabetes mellitus (HbA1c 6.4% Dec 2015), anemia of chronic disease, and mild protein malnutrition who presents to [facility] ED with complaints of confusion today.  He was observed to be driving erratically today and was pulled over by police, after which EMS was activated and thought he was in atrial flutter; he was transported to the ED here.  He denies being confused and remembers the events that transpired today.  He pulled out in front of a police car which led to his being pulled over.  He had just come from his PCP's office for which he was prescribed antibiotics for his left leg infection and a possible urinary tract infection.  He has been dealing with a left lower extremity wound for the past two months.  He admits to picking at it constantly and it started to get more red, swollen, and painful in the last few weeks.  He cannot recall any trauma to the area and it has not affected his ambulation.  He also complains that he constantly urinates and thinks that it's due to his prostate problems.  He denies any dysuria but does dribble a lot.  He also thinks that it's more concentrated than usual.  He has not experienced any fevers, chills, nausea, nor any vomiting, but has been feeling a bit weak lately.  His appetite has been good and he hasn't lost any weight.  He was not able to fill his medications.    He does report an episode of falling today.  He was bending over to  some change but had lost his balance and fell.  He denies any antecedent chest pain, shortness of breath, nor  palpitations, and did not sustain any head trauma nor loss of consciousness.    * No surgery found *      Indwelling Lines/Drains at time of discharge:   Lines/Drains/Airways          No matching active lines, drains, or airways        Hospital Course:   Pt admitted with sepsis secondary to LE cellulitis. One blood culture bottle positive GPC. On vanc and zosyn. Repeat blood cultures negative. Likely contaminant on first culture. LE improving. Continue IV abx. Increased IV Vanc 1250mg. Sepsis resolved. Cr is stable.  Recommending HH. Wound care consult for LE cracked dry skin and healing wounds.     Pt is ambulating with PT. Switched to augmentin to complete 14 day course. He continues to work and on feet most of the day and asked that he take some time off so cellulitis can heal. Follow up PCP to guerrero CU[facility].      Consults:   Consults         Status Ordering Provider     Inpatient consult to Social Work  Once     Provider:  (Not yet assigned)    Completed NISA CASH     Inpatient consult to Social Work/Case Management  Once     Provider:  (Not yet assigned)    Completed NISA CASH          Pending Diagnostic Studies:     None        Final Active Diagnoses:    Diagnosis Date Noted POA    PRINCIPAL PROBLEM:  Cellulitis of left lower extremity [L03.116] 07/18/2017 Yes    Essential hypertension [I10] 07/18/2017 Yes     Chronic    Type 2 diabetes mellitus, controlled [E11.9] 07/18/2017 Yes     Chronic    Anemia of chronic disease [D63.8] 07/18/2017 Yes     Chronic    Mild protein malnutrition [E44.1] 07/18/2017 Yes     Chronic      Problems Resolved During this Admission:    Diagnosis Date Noted Date Resolved POA    Sepsis [A41.9] 07/18/2017 07/18/2017 Yes    Acute renal failure [N17.9] 07/18/2017 07/24/2017 Yes    UTI (urinary tract infection) [N39.0] 07/18/2017 07/24/2017 Yes    Severe sepsis [A41.9, R65.20] 07/18/2017 07/24/2017 Yes      Type 2 diabetes mellitus, controlled    Well-controlled on a  home regimen of metformin; will provide basal-prandial insulin along with insulin sliding scale.        Essential hypertension    Patient's blood pressure is well-controlled; will continue home regimen of metoprolol, and provide as-needed clonidine.        * Cellulitis of left lower extremity    Patient has a left lower extremity lesion that is suspicious for cellulitis.  He does have a fever and leukocytosis, and therefore meets criteria for sepsis.  Ultrasound of the LLE is negative for DVT.  Blood cultures  + GPC,repeat blood culture negative   Increase vanc 1250mg   On vanc and zosyn with improvement    Wound care consult for chronic wounds to LE            Discharged Condition: good    Disposition: Home-Health Care c    Follow Up:  Follow-up Information     Ahsan Guzman MD On 7/28/2017.    Specialty:  Family Medicine  Why:  Outpatient Services, PCP follow-up appointment. Patient should arrive by 11:15AM.   Contact information:  [address]  [place] [address]  [phone]                 Patient Instructions:     Diet Diabetic 2000 Calories     Diet Cardiac     Activity as tolerated     Call MD for:  severe uncontrolled pain     Call MD for:  temperature >100.4     Call MD for:  redness, tenderness, or signs of infection (pain, swelling, redness, odor or green/yellow discharge around incision site)       Medications:  Reconciled Home Medications:   Discharge Medication List as of 7/24/2017  4:24 PM      START taking these medications    Details   miconazole (MICOTIN) 2 % cream Apply topically 2 (two) times daily., Starting Mon 7/24/2017, OTC         CONTINUE these medications which have CHANGED    Details   amoxicillin-clavulanate 875-125mg (AUGMENTIN) 875-125 mg per tablet Take 1 tablet by mouth every 12 (twelve) hours., Starting Mon 7/24/2017, Until Wed 8/2/2017, Print      metformin (GLUCOPHAGE) 500 MG tablet Take 1 tablet (500 mg total) by mouth 2 (two) times daily with meals.,  Starting Mon 7/24/2017, Until Wed 8/23/2017, Print      metoprolol succinate (TOPROL-XL) 25 MG 24 hr tablet Take 1 tablet (25 mg total) by mouth once daily., Starting Mon 7/24/2017, Until Tue 7/24/2018, Print         CONTINUE these medications which have NOT CHANGED    Details   hydrocortisone 1 % cream Apply topically as needed., Historical Med         STOP taking these medications       pseudoephedrine (SUDAFED) 30 MG tablet Comments:   Reason for Stopping:         sulfamethoxazole-trimethoprim 800-160mg (BACTRIM DS) 800-160 mg Tab Comments:   Reason for Stopping:         sulindac (CLINORIL) 150 MG tablet Comments:   Reason for Stopping:             Time spent on the discharge of patient: 40 minutes    HOS POC IP DISCHARGE SUMMARY    Leona Gracia MD  Department of Hospital Medicine  [facility]

## 2022-07-20 ENCOUNTER — OUTSIDE PLACE OF SERVICE (OUTPATIENT)
Dept: FAMILY MEDICINE | Facility: CLINIC | Age: 77
End: 2022-07-20
Payer: MEDICARE

## 2022-07-20 PROCEDURE — 99308 PR NURSING FAC CARE, SUBSEQ, MINOR COMPLIC: ICD-10-PCS | Mod: ,,, | Performed by: FAMILY MEDICINE

## 2022-07-20 PROCEDURE — 99308 SBSQ NF CARE LOW MDM 20: CPT | Mod: ,,, | Performed by: FAMILY MEDICINE

## 2022-08-24 ENCOUNTER — OUTSIDE PLACE OF SERVICE (OUTPATIENT)
Dept: FAMILY MEDICINE | Facility: CLINIC | Age: 77
End: 2022-08-24
Payer: MEDICARE

## 2022-08-24 PROCEDURE — 99308 SBSQ NF CARE LOW MDM 20: CPT | Mod: ,,, | Performed by: FAMILY MEDICINE

## 2022-08-24 PROCEDURE — 99308 PR NURSING FAC CARE, SUBSEQ, MINOR COMPLIC: ICD-10-PCS | Mod: ,,, | Performed by: FAMILY MEDICINE

## 2022-09-09 ENCOUNTER — HOSPITAL ENCOUNTER (INPATIENT)
Facility: HOSPITAL | Age: 77
LOS: 5 days | Discharge: HOME | DRG: 193 | End: 2022-09-16
Attending: STUDENT IN AN ORGANIZED HEALTH CARE EDUCATION/TRAINING PROGRAM | Admitting: INTERNAL MEDICINE
Payer: MEDICARE

## 2022-09-09 DIAGNOSIS — R06.02 SHORTNESS OF BREATH: ICD-10-CM

## 2022-09-09 DIAGNOSIS — J96.01 ACUTE HYPOXEMIC RESPIRATORY FAILURE: ICD-10-CM

## 2022-09-09 DIAGNOSIS — R07.9 CHEST PAIN: ICD-10-CM

## 2022-09-09 DIAGNOSIS — R79.89 ELEVATED BRAIN NATRIURETIC PEPTIDE (BNP) LEVEL: ICD-10-CM

## 2022-09-09 DIAGNOSIS — F03.90 DEMENTIA WITHOUT BEHAVIORAL DISTURBANCE, UNSPECIFIED DEMENTIA TYPE: ICD-10-CM

## 2022-09-09 DIAGNOSIS — J18.9 PNEUMONIA OF BOTH LUNGS DUE TO INFECTIOUS ORGANISM, UNSPECIFIED PART OF LUNG: Primary | ICD-10-CM

## 2022-09-09 DIAGNOSIS — R79.89 ELEVATED TROPONIN: ICD-10-CM

## 2022-09-09 PROBLEM — D72.829 ELEVATED WBC COUNT: Status: ACTIVE | Noted: 2022-09-09

## 2022-09-09 PROBLEM — Z79.01 ANTICOAGULANT LONG-TERM USE: Status: ACTIVE | Noted: 2022-09-09

## 2022-09-09 LAB
ALBUMIN SERPL BCP-MCNC: 2.8 G/DL (ref 3.5–5.2)
ALLENS TEST: ABNORMAL
ALP SERPL-CCNC: 90 U/L (ref 55–135)
ALT SERPL W/O P-5'-P-CCNC: 27 U/L (ref 10–44)
ANION GAP SERPL CALC-SCNC: 7 MMOL/L (ref 8–16)
AST SERPL-CCNC: 23 U/L (ref 10–40)
BASOPHILS # BLD AUTO: 0.02 K/UL (ref 0–0.2)
BASOPHILS NFR BLD: 0.1 % (ref 0–1.9)
BILIRUB SERPL-MCNC: 2 MG/DL (ref 0.1–1)
BNP SERPL-MCNC: 136 PG/ML (ref 0–99)
BUN SERPL-MCNC: 20 MG/DL (ref 8–23)
CALCIUM SERPL-MCNC: 9.9 MG/DL (ref 8.7–10.5)
CHLORIDE SERPL-SCNC: 100 MMOL/L (ref 95–110)
CO2 SERPL-SCNC: 29 MMOL/L (ref 23–29)
CREAT SERPL-MCNC: 0.8 MG/DL (ref 0.5–1.4)
CTP QC/QA: YES
DELSYS: ABNORMAL
DIFFERENTIAL METHOD: ABNORMAL
EOSINOPHIL # BLD AUTO: 0 K/UL (ref 0–0.5)
EOSINOPHIL NFR BLD: 0.1 % (ref 0–8)
ERYTHROCYTE [DISTWIDTH] IN BLOOD BY AUTOMATED COUNT: 16.4 % (ref 11.5–14.5)
EST. GFR  (NO RACE VARIABLE): >60 ML/MIN/1.73 M^2
ESTIMATED AVG GLUCOSE: 123 MG/DL (ref 68–131)
FIO2: 32
FLOW: 3
GLUCOSE SERPL-MCNC: 171 MG/DL (ref 70–110)
HBA1C MFR BLD: 5.9 % (ref 4–5.6)
HCO3 UR-SCNC: 32.2 MMOL/L (ref 24–28)
HCT VFR BLD AUTO: 36.3 % (ref 40–54)
HGB BLD-MCNC: 11.9 G/DL (ref 14–18)
IMM GRANULOCYTES # BLD AUTO: 0.14 K/UL (ref 0–0.04)
IMM GRANULOCYTES NFR BLD AUTO: 1 % (ref 0–0.5)
LYMPHOCYTES # BLD AUTO: 1.2 K/UL (ref 1–4.8)
LYMPHOCYTES NFR BLD: 8.3 % (ref 18–48)
MCH RBC QN AUTO: 32.2 PG (ref 27–31)
MCHC RBC AUTO-ENTMCNC: 32.8 G/DL (ref 32–36)
MCV RBC AUTO: 98 FL (ref 82–98)
MODE: ABNORMAL
MONOCYTES # BLD AUTO: 1.2 K/UL (ref 0.3–1)
MONOCYTES NFR BLD: 8.2 % (ref 4–15)
NEUTROPHILS # BLD AUTO: 12.1 K/UL (ref 1.8–7.7)
NEUTROPHILS NFR BLD: 82.3 % (ref 38–73)
NRBC BLD-RTO: 0 /100 WBC
PCO2 BLDA: 51 MMHG (ref 35–45)
PH SMN: 7.41 [PH] (ref 7.35–7.45)
PLATELET # BLD AUTO: 251 K/UL (ref 150–450)
PMV BLD AUTO: 10.4 FL (ref 9.2–12.9)
PO2 BLDA: 34 MMHG (ref 40–60)
POC BE: 8 MMOL/L
POC SATURATED O2: 65 % (ref 95–100)
POC TCO2: 34 MMOL/L (ref 24–29)
POCT GLUCOSE: 206 MG/DL (ref 70–110)
POTASSIUM SERPL-SCNC: 4.4 MMOL/L (ref 3.5–5.1)
PROT SERPL-MCNC: 7.2 G/DL (ref 6–8.4)
RBC # BLD AUTO: 3.69 M/UL (ref 4.6–6.2)
SAMPLE: ABNORMAL
SARS-COV-2 RDRP RESP QL NAA+PROBE: NEGATIVE
SITE: ABNORMAL
SODIUM SERPL-SCNC: 136 MMOL/L (ref 136–145)
SP02: 95
TROPONIN I SERPL DL<=0.01 NG/ML-MCNC: 0.03 NG/ML (ref 0–0.03)
TROPONIN I SERPL DL<=0.01 NG/ML-MCNC: 0.04 NG/ML (ref 0–0.03)
WBC # BLD AUTO: 14.64 K/UL (ref 3.9–12.7)

## 2022-09-09 PROCEDURE — U0002 COVID-19 LAB TEST NON-CDC: HCPCS | Performed by: STUDENT IN AN ORGANIZED HEALTH CARE EDUCATION/TRAINING PROGRAM

## 2022-09-09 PROCEDURE — 94640 AIRWAY INHALATION TREATMENT: CPT | Mod: XB

## 2022-09-09 PROCEDURE — 63600175 PHARM REV CODE 636 W HCPCS: Performed by: STUDENT IN AN ORGANIZED HEALTH CARE EDUCATION/TRAINING PROGRAM

## 2022-09-09 PROCEDURE — 82803 BLOOD GASES ANY COMBINATION: CPT

## 2022-09-09 PROCEDURE — 93005 ELECTROCARDIOGRAM TRACING: CPT

## 2022-09-09 PROCEDURE — 84484 ASSAY OF TROPONIN QUANT: CPT | Performed by: STUDENT IN AN ORGANIZED HEALTH CARE EDUCATION/TRAINING PROGRAM

## 2022-09-09 PROCEDURE — 63600175 PHARM REV CODE 636 W HCPCS: Performed by: NURSE PRACTITIONER

## 2022-09-09 PROCEDURE — G0378 HOSPITAL OBSERVATION PER HR: HCPCS

## 2022-09-09 PROCEDURE — 99900035 HC TECH TIME PER 15 MIN (STAT)

## 2022-09-09 PROCEDURE — 25000242 PHARM REV CODE 250 ALT 637 W/ HCPCS: Performed by: STUDENT IN AN ORGANIZED HEALTH CARE EDUCATION/TRAINING PROGRAM

## 2022-09-09 PROCEDURE — 83036 HEMOGLOBIN GLYCOSYLATED A1C: CPT | Performed by: NURSE PRACTITIONER

## 2022-09-09 PROCEDURE — 93010 ELECTROCARDIOGRAM REPORT: CPT | Mod: ,,, | Performed by: INTERNAL MEDICINE

## 2022-09-09 PROCEDURE — 96367 TX/PROPH/DG ADDL SEQ IV INF: CPT

## 2022-09-09 PROCEDURE — 83880 ASSAY OF NATRIURETIC PEPTIDE: CPT | Performed by: STUDENT IN AN ORGANIZED HEALTH CARE EDUCATION/TRAINING PROGRAM

## 2022-09-09 PROCEDURE — 25000003 PHARM REV CODE 250: Performed by: STUDENT IN AN ORGANIZED HEALTH CARE EDUCATION/TRAINING PROGRAM

## 2022-09-09 PROCEDURE — 94761 N-INVAS EAR/PLS OXIMETRY MLT: CPT

## 2022-09-09 PROCEDURE — 94760 N-INVAS EAR/PLS OXIMETRY 1: CPT

## 2022-09-09 PROCEDURE — 36415 COLL VENOUS BLD VENIPUNCTURE: CPT | Performed by: NURSE PRACTITIONER

## 2022-09-09 PROCEDURE — 96365 THER/PROPH/DIAG IV INF INIT: CPT

## 2022-09-09 PROCEDURE — 99285 EMERGENCY DEPT VISIT HI MDM: CPT | Mod: 25

## 2022-09-09 PROCEDURE — 93010 EKG 12-LEAD: ICD-10-PCS | Mod: ,,, | Performed by: INTERNAL MEDICINE

## 2022-09-09 PROCEDURE — 85025 COMPLETE CBC W/AUTO DIFF WBC: CPT | Performed by: STUDENT IN AN ORGANIZED HEALTH CARE EDUCATION/TRAINING PROGRAM

## 2022-09-09 PROCEDURE — 96372 THER/PROPH/DIAG INJ SC/IM: CPT | Performed by: NURSE PRACTITIONER

## 2022-09-09 PROCEDURE — 80053 COMPREHEN METABOLIC PANEL: CPT | Performed by: STUDENT IN AN ORGANIZED HEALTH CARE EDUCATION/TRAINING PROGRAM

## 2022-09-09 RX ORDER — IBUPROFEN 200 MG
24 TABLET ORAL
Status: DISCONTINUED | OUTPATIENT
Start: 2022-09-09 | End: 2022-09-16 | Stop reason: HOSPADM

## 2022-09-09 RX ORDER — ASPIRIN 81 MG/1
81 TABLET ORAL DAILY
Status: DISCONTINUED | OUTPATIENT
Start: 2022-09-10 | End: 2022-09-16 | Stop reason: HOSPADM

## 2022-09-09 RX ORDER — POVIDONE-IODINE 10 %
SOLUTION, NON-ORAL TOPICAL
Status: ON HOLD | COMMUNITY
End: 2024-02-20 | Stop reason: HOSPADM

## 2022-09-09 RX ORDER — IBUPROFEN 200 MG
16 TABLET ORAL
Status: DISCONTINUED | OUTPATIENT
Start: 2022-09-09 | End: 2022-09-16 | Stop reason: HOSPADM

## 2022-09-09 RX ORDER — IPRATROPIUM BROMIDE AND ALBUTEROL SULFATE 2.5; .5 MG/3ML; MG/3ML
3 SOLUTION RESPIRATORY (INHALATION) EVERY 6 HOURS PRN
Status: DISCONTINUED | OUTPATIENT
Start: 2022-09-09 | End: 2022-09-16 | Stop reason: HOSPADM

## 2022-09-09 RX ORDER — MENTHOL AND ZINC OXIDE .44; 20.625 G/100G; G/100G
OINTMENT TOPICAL
COMMUNITY

## 2022-09-09 RX ORDER — LISINOPRIL 5 MG/1
5 TABLET ORAL DAILY
COMMUNITY

## 2022-09-09 RX ORDER — ONDANSETRON 2 MG/ML
4 INJECTION INTRAMUSCULAR; INTRAVENOUS
Status: ACTIVE | OUTPATIENT
Start: 2022-09-09 | End: 2022-09-10

## 2022-09-09 RX ORDER — HYDROCODONE BITARTRATE AND ACETAMINOPHEN 5; 325 MG/1; MG/1
TABLET ORAL
Status: ON HOLD | COMMUNITY
End: 2022-09-09

## 2022-09-09 RX ORDER — IPRATROPIUM BROMIDE AND ALBUTEROL SULFATE 2.5; .5 MG/3ML; MG/3ML
3 SOLUTION RESPIRATORY (INHALATION) ONCE
Status: COMPLETED | OUTPATIENT
Start: 2022-09-09 | End: 2022-09-09

## 2022-09-09 RX ORDER — AMLODIPINE BESYLATE 2.5 MG/1
2.5 TABLET ORAL DAILY
COMMUNITY

## 2022-09-09 RX ORDER — SPIRONOLACTONE 25 MG/1
TABLET ORAL
Status: ON HOLD | COMMUNITY
End: 2022-09-09

## 2022-09-09 RX ORDER — SODIUM CHLORIDE 0.9 % (FLUSH) 0.9 %
10 SYRINGE (ML) INJECTION EVERY 12 HOURS PRN
Status: DISCONTINUED | OUTPATIENT
Start: 2022-09-09 | End: 2022-09-16 | Stop reason: HOSPADM

## 2022-09-09 RX ORDER — AMLODIPINE BESYLATE 2.5 MG/1
2.5 TABLET ORAL DAILY
Status: DISCONTINUED | OUTPATIENT
Start: 2022-09-10 | End: 2022-09-10

## 2022-09-09 RX ORDER — ATORVASTATIN CALCIUM 20 MG/1
20 TABLET, FILM COATED ORAL DAILY
COMMUNITY

## 2022-09-09 RX ORDER — ATORVASTATIN CALCIUM 20 MG/1
20 TABLET, FILM COATED ORAL DAILY
Status: DISCONTINUED | OUTPATIENT
Start: 2022-09-10 | End: 2022-09-16 | Stop reason: HOSPADM

## 2022-09-09 RX ORDER — ASCORBIC ACID 500 MG
500 TABLET ORAL DAILY
COMMUNITY

## 2022-09-09 RX ORDER — LORAZEPAM 1 MG/1
TABLET ORAL
Status: ON HOLD | COMMUNITY
End: 2022-09-09

## 2022-09-09 RX ORDER — GLUCAGON 1 MG
1 KIT INJECTION
Status: DISCONTINUED | OUTPATIENT
Start: 2022-09-09 | End: 2022-09-16 | Stop reason: HOSPADM

## 2022-09-09 RX ORDER — ASPIRIN 81 MG/1
81 TABLET ORAL DAILY
COMMUNITY

## 2022-09-09 RX ORDER — NALOXONE HCL 0.4 MG/ML
0.02 VIAL (ML) INJECTION
Status: DISCONTINUED | OUTPATIENT
Start: 2022-09-09 | End: 2022-09-16 | Stop reason: HOSPADM

## 2022-09-09 RX ORDER — DULAGLUTIDE 0.75 MG/.5ML
0.75 INJECTION, SOLUTION SUBCUTANEOUS
COMMUNITY

## 2022-09-09 RX ORDER — CHOLECALCIFEROL (VITAMIN D3) 25 MCG
1000 TABLET ORAL DAILY
COMMUNITY

## 2022-09-09 RX ORDER — INSULIN ASPART 100 [IU]/ML
0-5 INJECTION, SOLUTION INTRAVENOUS; SUBCUTANEOUS
Status: DISCONTINUED | OUTPATIENT
Start: 2022-09-09 | End: 2022-09-16 | Stop reason: HOSPADM

## 2022-09-09 RX ORDER — LISINOPRIL 5 MG/1
5 TABLET ORAL DAILY
Status: DISCONTINUED | OUTPATIENT
Start: 2022-09-10 | End: 2022-09-10

## 2022-09-09 RX ADMIN — CEFTRIAXONE SODIUM 1 G: 1 INJECTION, POWDER, FOR SOLUTION INTRAMUSCULAR; INTRAVENOUS at 05:09

## 2022-09-09 RX ADMIN — AZITHROMYCIN MONOHYDRATE 500 MG: 500 INJECTION, POWDER, LYOPHILIZED, FOR SOLUTION INTRAVENOUS at 06:09

## 2022-09-09 RX ADMIN — IPRATROPIUM BROMIDE AND ALBUTEROL SULFATE 3 ML: .5; 3 SOLUTION RESPIRATORY (INHALATION) at 07:09

## 2022-09-09 RX ADMIN — INSULIN ASPART 1 UNITS: 100 INJECTION, SOLUTION INTRAVENOUS; SUBCUTANEOUS at 09:09

## 2022-09-09 RX ADMIN — IPRATROPIUM BROMIDE AND ALBUTEROL SULFATE 3 ML: .5; 3 SOLUTION RESPIRATORY (INHALATION) at 05:09

## 2022-09-09 NOTE — ED PROVIDER NOTES
Encounter Date: 9/9/2022       History     Chief Complaint   Patient presents with    Shortness of Breath     Pt arrived via [facility] from [facility] in [place] with c/o productive cough and SOB for several days. Per EMS, NH staff advised pt's O2 sats were in the 80s earlier today which is why they called EMS. EMS reports pt's O2 sats have been within normal limits throughout their care. Pt is A&O on arrival. Audible rales can be heard when pt coughs, but no acute distress in breathing noted otherwise     77-year-old male with past medical history of diabetes, hypertension, presents from nursing facility with concern for worsening productive cough and shortness of breath over the past few days.  History is somewhat limited as patient is very soft-spoken is unclear what his baseline mentation is, though he does appear tired and frail.  He does deny any pain at this time.  Notes that cough is new in that typically he does not wear oxygen.  Attempted to reach out to his son  listed as contact and unfortunately was unsuccessful in doing so.      HPI  Review of patient's allergies indicates:  No Known Allergies  Past Medical History:   Diagnosis Date    Diabetes mellitus     Hypertension     Varicose veins      Past Surgical History:   Procedure Laterality Date    VARICOSE VEIN SURGERY       No family history on file.  Social History     Tobacco Use    Smoking status: Never    Smokeless tobacco: Never   Substance Use Topics    Alcohol use: No    Drug use: No     Review of Systems   Unable to perform ROS: Mental status change   Constitutional:  Positive for activity change. Negative for fever.   Respiratory:  Positive for cough and shortness of breath.    Cardiovascular:  Positive for chest pain.   Gastrointestinal:  Negative for nausea.   Genitourinary:  Negative for dysuria.   Musculoskeletal:  Negative for back pain.   Skin:  Negative for rash.   Neurological:  Negative for weakness.   Hematological:   Does not bruise/bleed easily.     Physical Exam     Initial Vitals [09/09/22 1426]   BP Pulse Resp Temp SpO2   133/78 102 20 98.1 °F (36.7 °C) 97 %      MAP       --         Physical Exam    Nursing note and vitals reviewed.  Constitutional: He appears well-developed and well-nourished.   Thin, frail   HENT:   Head: Normocephalic and atraumatic.   Eyes: EOM are normal. Pupils are equal, round, and reactive to light.   Neck: Neck supple.   Normal range of motion.  Cardiovascular:  Normal rate and regular rhythm.           Pulmonary/Chest: No respiratory distress. He has rales.   On 2L NC.    Abdominal: Abdomen is soft. There is no abdominal tenderness.   Musculoskeletal:         General: Normal range of motion.      Cervical back: Normal range of motion and neck supple.     Neurological: He is alert.   He is oriented to self, unclear if he is oriented to place and time she is only intermittently responding to my questioning.  Moving all extremities spontaneously.   Skin: Skin is warm and dry.   Psychiatric: He has a normal mood and affect.       ED Course   Procedures  Labs Reviewed   CBC W/ AUTO DIFFERENTIAL - Abnormal; Notable for the following components:       Result Value    WBC 14.64 (*)     RBC 3.69 (*)     Hemoglobin 11.9 (*)     Hematocrit 36.3 (*)     MCH 32.2 (*)     RDW 16.4 (*)     Immature Granulocytes 1.0 (*)     Gran # (ANC) 12.1 (*)     Immature Grans (Abs) 0.14 (*)     Mono # 1.2 (*)     Gran % 82.3 (*)     Lymph % 8.3 (*)     All other components within normal limits   COMPREHENSIVE METABOLIC PANEL - Abnormal; Notable for the following components:    Glucose 171 (*)     Albumin 2.8 (*)     Total Bilirubin 2.0 (*)     Anion Gap 7 (*)     All other components within normal limits   TROPONIN I - Abnormal; Notable for the following components:    Troponin I 0.037 (*)     All other components within normal limits   B-TYPE NATRIURETIC PEPTIDE - Abnormal; Notable for the following components:      (*)     All other components within normal limits   ISTAT PROCEDURE - Abnormal; Notable for the following components:    POC PCO2 51.0 (*)     POC PO2 34 (*)     POC HCO3 32.2 (*)     POC SATURATED O2 65 (*)     POC TCO2 34 (*)     All other components within normal limits   SARS-COV-2 RDRP GENE     EKG Readings: (Independently Interpreted)   Oral unchanged from previous EKG from July 18, 2017.  Right bundle-branch block remained straight sinus tachycardia rate of 1 1.  .  STEMI with noted     Imaging Results              X-Ray Chest AP Portable (Final result)  Result time 09/09/22 16:06:52      Final result by Jose Harmon MD (09/09/22 16:06:52)                   Impression:      As above.      Electronically signed by: Jose Harmon  Date:    09/09/2022  Time:    16:06               Narrative:    EXAMINATION:  XR CHEST AP PORTABLE    CLINICAL HISTORY:  CHF;    TECHNIQUE:  Single frontal view of the chest was performed.    COMPARISON:  December 1, 2014    FINDINGS:  Stable enlargement of cardiopericardial silhouette.  Pulmonary vasculature is felt within limits of normal.  New and or intervally progressed bilateral coarse interstitial markings, most prevalent in the left lower lung zone region..  Though this could relate to progressive fibrosis and or superimposed atelectatic change, intervally developed interstitial infiltrates and or some degree of interstitial edema not excluded.  Clinical correlation.  Ill-defined 1 cm or so density projecting about the right mid lung zone in part superimposed by anterior right rib 3 and posterior right rib 6 as well as scapula and opaque tubing external to the patient is felt most likely related to superimposition artifact, more confluent focus of infiltrate and or atelectasis or subtle parenchymal nodule felt less likely.  This could be further confirmed or excluded when possible with PA and lateral views of the chest..  No obvious pleural fluid blunting right or  left costophrenic sulci.  No pneumothorax.  Cardiac event recorder superimposes left hemithorax soft tissues.                        ED Interpretation by Babs Richards DO (09/09/22 15:51:10, [facility] Emergency Dept, Emergency Medicine)    CHEST XRAY:  -- This is a(n) portable chest exam with adequate penetration, positioning and good air entry.   -- Trachea is midline, cardiac and mediastinal silhouettes are WNL.   -- There are no focal infiltrates, consolidations or effusions. Possible vascular congestion  -- Impression: early vascular congestion, cardiomegaly.   -- This exam was independently interpreted by me, Dr. Babs Richards                                        Medications   cefTRIAXone (ROCEPHIN) 1 g/50 mL D5W IVPB (has no administration in time range)   azithromycin 500 mg in dextrose 5 % 250 mL IVPB (ready to mix system) (has no administration in time range)   albuterol-ipratropium 2.5 mg-0.5 mg/3 mL nebulizer solution 3 mL (3 mLs Nebulization Given 9/9/22 1703)     Medical Decision Making:   History:   I obtained history from: EMS provider.  Old Medical Records: I decided to obtain old medical records.  Old Records Summarized: records from previous admission(s).       <> Summary of Records: No recent hospitalizations for does not have any known history of heart failure for our patients.  Initial Assessment:   77-year-old male presenting from extended care facility who appears generally frail, weak, on nasal cannula with rales with cough and deep inspiration.  No evidence of respiratory distress.  Very quiet and only intermittently answering questions.  Vitals are stable.  Afebrile.  Differential Diagnosis:   Pneumonia, CHF exacerbation, less likely ACS, electrolyte derangement, anemia, amongst others  Clinical Tests:   Lab Tests: Ordered and Reviewed  Radiological Study: Ordered and Reviewed  ED Management:  Was able to contact son, 2 years of short term memory loss, confusion, not very conversational at  baseline and often moves his mouth, but doesn't speak.  States the facility called him today and that he had 1 episode of vomiting and then was noted to have oxygen saturations in the 80s.  Arrives on oxygen here.  He does have coarse breath sounds throughout I do have concern for possible underlying aspiration pneumonitis.  Will treat empirically at this time for community-acquired pneumonia as it is unclear if he may have had increased coughing congestion over the past few days.  Evaluation notable for leukocytosis as well as mildly elevated troponin.  EKG is unchanged from previous and patient is currently without any complaints.  Plan to hospitalized due to new oxygen requirement and for continued evaluation from a cardiac standpoint.           ED Course as of 09/09/22 1723   Fri Sep 09, 2022   1549 SARS-CoV-2 RNA, Amplification, Qual: Negative [HL]   1551 WBC(!): 14.64 [HL]   1551 Hemoglobin(!): 11.9  stable [HL]   1611 Comprehensive metabolic panel(!)  Mild hyperglycemia at 171, likely 2/2 hx of DM. Albumin of 2.8 c/w poor nutrition. Normal renal function.  [HL]   1723 Admitted to [facility] hospitalist service. [HL]      ED Course User Index  [HL] Babs Richards DO             Clinical Impression:   Final diagnoses:  [R06.02] Shortness of breath  [J18.9] Pneumonia of both lungs due to infectious organism, unspecified part of lung (Primary)  [J96.01] Acute hypoxemic respiratory failure  [F03.90] Dementia without behavioral disturbance, unspecified dementia type  [R77.8] Elevated troponin        ED Disposition Condition    Observation                 Babs Richards DO  09/09/22 1723

## 2022-09-09 NOTE — PHARMACY MED REC
"    [facility]           Pharmacy  Admission Medication History     The home medication history was taken by Radha oClby.      Medication history obtained from Medications listed below were obtained from: Nursing home    Based on information gathered for medication list, you may go to "Admission" then "Reconcile Home Medications" tabs to review and/or act upon those items.     The home medication list has been updated by the Pharmacy department.   Please read ALL comments highlighted in yellow.   Please address this information as you see fit.    Feel free to contact us if you have any questions or require assistance.    The medications listed below were removed from the home medication list.  Please reorder if appropriate:    Patient reports NOT TAKING the following medication(s):  Hydrocortisone cream 1%  Metformin 500mg  Toprol xl 25mg  Micotin cream 2%        No current facility-administered medications on file prior to encounter.     Current Outpatient Medications on File Prior to Encounter   Medication Sig Dispense Refill    amLODIPine (NORVASC) 2.5 MG tablet Take 2.5 mg by mouth once daily.      apixaban (ELIQUIS) 5 mg Tab Take 5 mg by mouth 2 (two) times daily.      ascorbic acid, vitamin C, (VITAMIN C) 500 MG tablet Take 500 mg by mouth once daily.      aspirin (ECOTRIN) 81 MG EC tablet Take 81 mg by mouth once daily.      atorvastatin (LIPITOR) 20 MG tablet Take 20 mg by mouth once daily.      dulaglutide (TRULICITY) 0.75 mg/0.5 mL pen injector Inject 0.75 mg into the skin every 7 days.      lisinopriL (PRINIVIL,ZESTRIL) 5 MG tablet Take 5 mg by mouth once daily.      menthol-zinc oxide (CALMOSEPTINE) 0.44-20.6 % Oint Apply topically as needed (redness/irritation). Apply to buttocks/groin      povidone-iodine (BETADINE) 10 % external solution Apply topically 3 (three) times a week. Paint scabs/wounds to left foot and allow to air dry      vitamin D (VITAMIN D3) 1000 units Tab Take " 1,000 Units by mouth once daily.      HYDROcodone-acetaminophen (NORCO) 5-325 mg per tablet hydrocodone 5 mg-acetaminophen 325 mg tablet      LORazepam (ATIVAN) 1 MG tablet lorazepam 1 mg tablet      spironolactone (ALDACTONE) 25 MG tablet spironolactone 25 mg tablet      [DISCONTINUED] hydrocortisone 1 % cream Apply topically as needed.      [DISCONTINUED] metformin (GLUCOPHAGE) 500 MG tablet Take 1 tablet (500 mg total) by mouth 2 (two) times daily with meals. 60 tablet 3    [DISCONTINUED] metoprolol succinate (TOPROL-XL) 25 MG 24 hr tablet Take 1 tablet (25 mg total) by mouth once daily. 30 tablet 3    [DISCONTINUED] miconazole (MICOTIN) 2 % cream Apply topically 2 (two) times daily.  0       Please address this information as you see fit.  Feel free to contact us if you have any questions or require assistance.    Radha Colby  [phone]              .

## 2022-09-09 NOTE — ED NOTES
Patient presents to ED via EMS from [facility] with c/o SOB and low O2 sats in the low 80s. Patient O2 sats normalized on 2L NC during transport with EMS. Patient arrived on 2L with O2 sats %, no acute distress noted, audible crackles noted bilaterally. Patient alert and able to answer orientation questions appropriately.

## 2022-09-09 NOTE — ED NOTES
Patient's O2 sats decreasing. Nasal cannula increased to 3L due to around 88-89% with a good waveform. Dr. Richards notified.

## 2022-09-10 LAB
ALBUMIN SERPL BCP-MCNC: 2.4 G/DL (ref 3.5–5.2)
ALP SERPL-CCNC: 87 U/L (ref 55–135)
ALT SERPL W/O P-5'-P-CCNC: 25 U/L (ref 10–44)
ANION GAP SERPL CALC-SCNC: 9 MMOL/L (ref 8–16)
AORTIC ROOT ANNULUS: 4.38 CM
AST SERPL-CCNC: 22 U/L (ref 10–40)
AV INDEX (PROSTH): 0.75
AV MEAN GRADIENT: 3 MMHG
AV PEAK GRADIENT: 6 MMHG
AV VALVE AREA: 3.47 CM2
AV VELOCITY RATIO: 0.88
BASOPHILS # BLD AUTO: 0.03 K/UL (ref 0–0.2)
BASOPHILS NFR BLD: 0.2 % (ref 0–1.9)
BILIRUB SERPL-MCNC: 1.5 MG/DL (ref 0.1–1)
BSA FOR ECHO PROCEDURE: 1.99 M2
BUN SERPL-MCNC: 23 MG/DL (ref 8–23)
CALCIUM SERPL-MCNC: 10 MG/DL (ref 8.7–10.5)
CHLORIDE SERPL-SCNC: 100 MMOL/L (ref 95–110)
CO2 SERPL-SCNC: 28 MMOL/L (ref 23–29)
CREAT SERPL-MCNC: 0.9 MG/DL (ref 0.5–1.4)
CV ECHO LV RWT: 0.68 CM
DIFFERENTIAL METHOD: ABNORMAL
DOP CALC AO PEAK VEL: 1.21 M/S
DOP CALC AO VTI: 21.17 CM
DOP CALC LVOT AREA: 4.6 CM2
DOP CALC LVOT DIAMETER: 2.43 CM
DOP CALC LVOT PEAK VEL: 1.07 M/S
DOP CALC LVOT STROKE VOLUME: 73.38 CM3
DOP CALC MV VTI: 18.1 CM
DOP CALCLVOT PEAK VEL VTI: 15.83 CM
E/A RATIO: 0.61
E/E' RATIO: 6.71 M/S
ECHO LV POSTERIOR WALL: 1.44 CM (ref 0.6–1.1)
EJECTION FRACTION: 60 %
EOSINOPHIL # BLD AUTO: 0 K/UL (ref 0–0.5)
EOSINOPHIL NFR BLD: 0.3 % (ref 0–8)
ERYTHROCYTE [DISTWIDTH] IN BLOOD BY AUTOMATED COUNT: 16.4 % (ref 11.5–14.5)
EST. GFR  (NO RACE VARIABLE): >60 ML/MIN/1.73 M^2
FRACTIONAL SHORTENING: 26 % (ref 28–44)
GLUCOSE SERPL-MCNC: 145 MG/DL (ref 70–110)
HCT VFR BLD AUTO: 35.1 % (ref 40–54)
HGB BLD-MCNC: 11.4 G/DL (ref 14–18)
IMM GRANULOCYTES # BLD AUTO: 0.2 K/UL (ref 0–0.04)
IMM GRANULOCYTES NFR BLD AUTO: 1.5 % (ref 0–0.5)
INTERVENTRICULAR SEPTUM: 1.54 CM (ref 0.6–1.1)
IVRT: 85.63 MSEC
LA MAJOR: 5.11 CM
LA MINOR: 5.28 CM
LA WIDTH: 2.89 CM
LEFT ATRIUM SIZE: 3.05 CM
LEFT ATRIUM VOLUME INDEX MOD: 16.7 ML/M2
LEFT ATRIUM VOLUME INDEX: 19.4 ML/M2
LEFT ATRIUM VOLUME MOD: 33.48 CM3
LEFT ATRIUM VOLUME: 38.91 CM3
LEFT INTERNAL DIMENSION IN SYSTOLE: 3.11 CM (ref 2.1–4)
LEFT VENTRICLE DIASTOLIC VOLUME INDEX: 39.46 ML/M2
LEFT VENTRICLE DIASTOLIC VOLUME: 79.32 ML
LEFT VENTRICLE MASS INDEX: 124 G/M2
LEFT VENTRICLE SYSTOLIC VOLUME INDEX: 19 ML/M2
LEFT VENTRICLE SYSTOLIC VOLUME: 38.24 ML
LEFT VENTRICULAR INTERNAL DIMENSION IN DIASTOLE: 4.22 CM (ref 3.5–6)
LEFT VENTRICULAR MASS: 248.62 G
LV LATERAL E/E' RATIO: 5.7 M/S
LV SEPTAL E/E' RATIO: 8.14 M/S
LYMPHOCYTES # BLD AUTO: 1.6 K/UL (ref 1–4.8)
LYMPHOCYTES NFR BLD: 12.5 % (ref 18–48)
MAGNESIUM SERPL-MCNC: 2 MG/DL (ref 1.6–2.6)
MCH RBC QN AUTO: 32.1 PG (ref 27–31)
MCHC RBC AUTO-ENTMCNC: 32.5 G/DL (ref 32–36)
MCV RBC AUTO: 99 FL (ref 82–98)
MONOCYTES # BLD AUTO: 1.1 K/UL (ref 0.3–1)
MONOCYTES NFR BLD: 8.3 % (ref 4–15)
MV A" WAVE DURATION": 11.13 MSEC
MV MEAN GRADIENT: 1 MMHG
MV PEAK A VEL: 0.94 M/S
MV PEAK E VEL: 0.57 M/S
MV PEAK GRADIENT: 3 MMHG
MV STENOSIS PRESSURE HALF TIME: 50.08 MS
MV VALVE AREA BY CONTINUITY EQUATION: 4.05 CM2
MV VALVE AREA P 1/2 METHOD: 4.39 CM2
NEUTROPHILS # BLD AUTO: 10.1 K/UL (ref 1.8–7.7)
NEUTROPHILS NFR BLD: 77.2 % (ref 38–73)
NRBC BLD-RTO: 0 /100 WBC
PHOSPHATE SERPL-MCNC: 3.3 MG/DL (ref 2.7–4.5)
PISA MRMAX VEL: 0.04 M/S
PISA TR MAX VEL: 2.66 M/S
PLATELET # BLD AUTO: 229 K/UL (ref 150–450)
PMV BLD AUTO: 9.9 FL (ref 9.2–12.9)
POCT GLUCOSE: 156 MG/DL (ref 70–110)
POCT GLUCOSE: 158 MG/DL (ref 70–110)
POCT GLUCOSE: 161 MG/DL (ref 70–110)
POCT GLUCOSE: 169 MG/DL (ref 70–110)
POTASSIUM SERPL-SCNC: 4.5 MMOL/L (ref 3.5–5.1)
PROT SERPL-MCNC: 6.4 G/DL (ref 6–8.4)
PULM VEIN S/D RATIO: 0.97
PV PEAK D VEL: 0.33 M/S
PV PEAK S VEL: 0.32 M/S
RA MAJOR: 4.32 CM
RA WIDTH: 2.42 CM
RBC # BLD AUTO: 3.55 M/UL (ref 4.6–6.2)
RIGHT VENTRICULAR END-DIASTOLIC DIMENSION: 2.23 CM
RV TISSUE DOPPLER FREE WALL SYSTOLIC VELOCITY 1 (APICAL 4 CHAMBER VIEW): 16.03 CM/S
SODIUM SERPL-SCNC: 137 MMOL/L (ref 136–145)
TDI LATERAL: 0.1 M/S
TDI SEPTAL: 0.07 M/S
TDI: 0.09 M/S
TR MAX PG: 28 MMHG
TRICUSPID ANNULAR PLANE SYSTOLIC EXCURSION: 1.85 CM
TROPONIN I SERPL DL<=0.01 NG/ML-MCNC: 0.03 NG/ML (ref 0–0.03)
TROPONIN I SERPL DL<=0.01 NG/ML-MCNC: 0.04 NG/ML (ref 0–0.03)
WBC # BLD AUTO: 13.08 K/UL (ref 3.9–12.7)

## 2022-09-10 PROCEDURE — G0378 HOSPITAL OBSERVATION PER HR: HCPCS

## 2022-09-10 PROCEDURE — 99900035 HC TECH TIME PER 15 MIN (STAT)

## 2022-09-10 PROCEDURE — 25000003 PHARM REV CODE 250: Performed by: NURSE PRACTITIONER

## 2022-09-10 PROCEDURE — 85025 COMPLETE CBC W/AUTO DIFF WBC: CPT | Performed by: NURSE PRACTITIONER

## 2022-09-10 PROCEDURE — 96372 THER/PROPH/DIAG INJ SC/IM: CPT | Performed by: INTERNAL MEDICINE

## 2022-09-10 PROCEDURE — P9047 ALBUMIN (HUMAN), 25%, 50ML: HCPCS | Mod: JG | Performed by: INTERNAL MEDICINE

## 2022-09-10 PROCEDURE — 94640 AIRWAY INHALATION TREATMENT: CPT

## 2022-09-10 PROCEDURE — 27000221 HC OXYGEN, UP TO 24 HOURS

## 2022-09-10 PROCEDURE — 80053 COMPREHEN METABOLIC PANEL: CPT | Performed by: NURSE PRACTITIONER

## 2022-09-10 PROCEDURE — 94761 N-INVAS EAR/PLS OXIMETRY MLT: CPT

## 2022-09-10 PROCEDURE — 84100 ASSAY OF PHOSPHORUS: CPT | Performed by: NURSE PRACTITIONER

## 2022-09-10 PROCEDURE — 84484 ASSAY OF TROPONIN QUANT: CPT | Performed by: NURSE PRACTITIONER

## 2022-09-10 PROCEDURE — 83735 ASSAY OF MAGNESIUM: CPT | Performed by: NURSE PRACTITIONER

## 2022-09-10 PROCEDURE — 63600175 PHARM REV CODE 636 W HCPCS: Performed by: NURSE PRACTITIONER

## 2022-09-10 PROCEDURE — 25000242 PHARM REV CODE 250 ALT 637 W/ HCPCS: Performed by: INTERNAL MEDICINE

## 2022-09-10 PROCEDURE — 36415 COLL VENOUS BLD VENIPUNCTURE: CPT | Performed by: NURSE PRACTITIONER

## 2022-09-10 PROCEDURE — 63600175 PHARM REV CODE 636 W HCPCS: Performed by: INTERNAL MEDICINE

## 2022-09-10 PROCEDURE — 92610 EVALUATE SWALLOWING FUNCTION: CPT

## 2022-09-10 PROCEDURE — 84484 ASSAY OF TROPONIN QUANT: CPT | Mod: 91 | Performed by: NURSE PRACTITIONER

## 2022-09-10 PROCEDURE — 25000003 PHARM REV CODE 250: Performed by: INTERNAL MEDICINE

## 2022-09-10 RX ORDER — DEXTROSE MONOHYDRATE AND SODIUM CHLORIDE 5; .9 G/100ML; G/100ML
INJECTION, SOLUTION INTRAVENOUS CONTINUOUS
Status: DISCONTINUED | OUTPATIENT
Start: 2022-09-10 | End: 2022-09-16 | Stop reason: HOSPADM

## 2022-09-10 RX ORDER — ONDANSETRON 2 MG/ML
4 INJECTION INTRAMUSCULAR; INTRAVENOUS EVERY 6 HOURS PRN
Status: DISCONTINUED | OUTPATIENT
Start: 2022-09-10 | End: 2022-09-16 | Stop reason: HOSPADM

## 2022-09-10 RX ORDER — PROMETHAZINE HYDROCHLORIDE 25 MG/ML
6.25 INJECTION, SOLUTION INTRAMUSCULAR; INTRAVENOUS EVERY 6 HOURS PRN
Status: DISCONTINUED | OUTPATIENT
Start: 2022-09-10 | End: 2022-09-16 | Stop reason: HOSPADM

## 2022-09-10 RX ORDER — ALBUMIN HUMAN 250 G/1000ML
25 SOLUTION INTRAVENOUS 2 TIMES DAILY
Status: DISCONTINUED | OUTPATIENT
Start: 2022-09-10 | End: 2022-09-11

## 2022-09-10 RX ORDER — IPRATROPIUM BROMIDE AND ALBUTEROL SULFATE 2.5; .5 MG/3ML; MG/3ML
3 SOLUTION RESPIRATORY (INHALATION) EVERY 6 HOURS
Status: COMPLETED | OUTPATIENT
Start: 2022-09-10 | End: 2022-09-12

## 2022-09-10 RX ORDER — ENOXAPARIN SODIUM 100 MG/ML
1 INJECTION SUBCUTANEOUS
Status: DISCONTINUED | OUTPATIENT
Start: 2022-09-10 | End: 2022-09-16 | Stop reason: HOSPADM

## 2022-09-10 RX ADMIN — ONDANSETRON 4 MG: 2 INJECTION INTRAMUSCULAR; INTRAVENOUS at 11:09

## 2022-09-10 RX ADMIN — AZITHROMYCIN MONOHYDRATE 500 MG: 500 INJECTION, POWDER, LYOPHILIZED, FOR SOLUTION INTRAVENOUS at 07:09

## 2022-09-10 RX ADMIN — CEFTRIAXONE 1 G: 1 INJECTION, SOLUTION INTRAVENOUS at 08:09

## 2022-09-10 RX ADMIN — LISINOPRIL 5 MG: 5 TABLET ORAL at 08:09

## 2022-09-10 RX ADMIN — SODIUM CHLORIDE 500 ML: 0.9 INJECTION, SOLUTION INTRAVENOUS at 05:09

## 2022-09-10 RX ADMIN — ENOXAPARIN SODIUM 80 MG: 100 INJECTION SUBCUTANEOUS at 11:09

## 2022-09-10 RX ADMIN — ASPIRIN 81 MG: 81 TABLET, COATED ORAL at 08:09

## 2022-09-10 RX ADMIN — AMLODIPINE BESYLATE 2.5 MG: 2.5 TABLET ORAL at 08:09

## 2022-09-10 RX ADMIN — ALBUMIN HUMAN 25 G: 0.25 SOLUTION INTRAVENOUS at 04:09

## 2022-09-10 RX ADMIN — ATORVASTATIN CALCIUM 20 MG: 20 TABLET, FILM COATED ORAL at 08:09

## 2022-09-10 RX ADMIN — DEXTROSE AND SODIUM CHLORIDE: 5; .9 INJECTION, SOLUTION INTRAVENOUS at 11:09

## 2022-09-10 RX ADMIN — DEXTROSE AND SODIUM CHLORIDE: 5; .9 INJECTION, SOLUTION INTRAVENOUS at 07:09

## 2022-09-10 RX ADMIN — IPRATROPIUM BROMIDE AND ALBUTEROL SULFATE 3 ML: 2.5; .5 SOLUTION RESPIRATORY (INHALATION) at 08:09

## 2022-09-10 NOTE — PLAN OF CARE
Problem: Adult Inpatient Plan of Care  Goal: Plan of Care Review  Outcome: Ongoing, Progressing     VIRTUAL NURSE:  Cued into patient's room.  Permission received per patient to turn camera to view patient.  Introduced as VN for night shift that will be working with floor nurse and nursing assistant.  Educated patient on VN's role in patient care and  VIP model.  Plan of care reviewed with patient.  Education per flowsheet.   Informed patient that staff will round on them every 2 hours but to use call light for any other needs they may have; informed of fall risk and fall precautions.  Patient verbalized understanding.  Call light within reach; bed siderails up x3.  Opportunity given for questions and questions answered.  Admission assessment questions answered.  Patient denies complaints or any needs at this time. Instructed to call for assistance.  Will cont to monitor and intervene as needed.    Spoke with patient's son, Axel Burgos, at [phone] to let him know patient is in the hospital.    Labs, notes, orders, and careplan reviewed.       09/09/22 2044   Patient Request   Patient Requested no complaints or needs currently   Admission   Initial VN Admission Questions Complete   Communication Issues? Technical Issue   Shift   Virtual Nurse - Rounding Complete   Pain Management Interventions pain management plan reviewed with patient/caregiver   Virtual Nurse - Patient Verbalized Approval Of Camera Use;VN Rounding   Type of Frequent Check   Type Patient Rounds   Safety/Activity   Patient Rounds bed in low position;placement of personal items at bedside;bed wheels locked;clutter free environment maintained;visualized patient;call light in patient/parent reach   Safety Promotion/Fall Prevention assistive device/personal item within reach;commode/urinal/bedpan at bedside;diversional activities provided;Fall Risk reviewed with patient/family;high risk medications identified;medications reviewed;nonskid  shoes/socks when out of bed;room near unit station;side rails raised x 3;supervised activity;instructed to call staff for mobility   Safety Precautions emergency equipment at bedside   Positioning   Body Position neutral body alignment;neutral head position   Head of Bed (HOB) Positioning HOB at 60-90 degrees   Pain/Comfort/Sleep   Preferred Pain Scale number (Numeric Rating Pain Scale)   Comfort/Acceptable Pain Level 4   Pain Rating (0-10): Rest 0   Sleep/Rest/Relaxation no problem identified;awake

## 2022-09-10 NOTE — H&P
[facility] Medicine  History & Physical    Patient Name: Axel Burgos  MRN: [id number]  Patient Class: OP- Observation  Admission Date: 9/9/2022  Attending Physician: Fabian Layton MD   Primary Care Provider: Ahsan Guzman MD         Patient information was obtained from past medical records and ER records.     Subjective:     Principal Problem:Acute hypoxemic respiratory failure    Chief Complaint:   Chief Complaint   Patient presents with    Shortness of Breath     Pt arrived via [facility] from [facility] in [place] with c/o productive cough and SOB for several days. Per EMS, NH staff advised pt's O2 sats were in the 80s earlier today which is why they called EMS. EMS reports pt's O2 sats have been within normal limits throughout their care. Pt is A&O on arrival. Audible rales can be heard when pt coughs, but no acute distress in breathing noted otherwise        HPI: 77-year-old male with past medical history of diabetes, hypertension, presents from nursing facility with concern for worsening productive cough and shortness of breath over the past few days.  History is somewhat limited as patient is very soft-spoken is unclear what his baseline mentation is, though he does appear tired and frail.  He does deny any pain at this time.  Notes that cough is new in that typically he does not wear oxygen.  According to patient's son, 2 years of short term memory loss, confusion, not very conversational at baseline and often moves his mouth, but doesn't speak.  The son reported that the nursing home called him today and reported that patient had one episode of vomiting and then was noted to have oxygen saturations in the 80s.  He arrived to ED on oxygen.  On exam, coarse breath sounds noted, concerning for aspiration pneumonia vs community acquired pneumonia. Will treat empirically at this time for community-acquired pneumonia as it is unclear if he may have had increased coughing  congestion over the past few days.  Labs remarkable for leukocytosis and mildly elevated troponin.  EKG pending. Chest x-ray concerning for new and or intervally progressed bilateral coarse interstitial markings, most prevalent in the left lower lung zone region. Patient admitted to hospital medicine observation service for further medical management.       Past Medical History:   Diagnosis Date    Diabetes mellitus     Hypertension     Varicose veins        Past Surgical History:   Procedure Laterality Date    VARICOSE VEIN SURGERY         Review of patient's allergies indicates:  No Known Allergies    No current facility-administered medications on file prior to encounter.     Current Outpatient Medications on File Prior to Encounter   Medication Sig    amLODIPine (NORVASC) 2.5 MG tablet Take 2.5 mg by mouth once daily.    apixaban (ELIQUIS) 5 mg Tab Take 5 mg by mouth 2 (two) times daily.    ascorbic acid, vitamin C, (VITAMIN C) 500 MG tablet Take 500 mg by mouth once daily.    aspirin (ECOTRIN) 81 MG EC tablet Take 81 mg by mouth once daily.    atorvastatin (LIPITOR) 20 MG tablet Take 20 mg by mouth once daily.    dulaglutide (TRULICITY) 0.75 mg/0.5 mL pen injector Inject 0.75 mg into the skin every 7 days.    lisinopriL (PRINIVIL,ZESTRIL) 5 MG tablet Take 5 mg by mouth once daily.    menthol-zinc oxide (CALMOSEPTINE) 0.44-20.6 % Oint Apply topically as needed (redness/irritation). Apply to buttocks/groin    povidone-iodine (BETADINE) 10 % external solution Apply topically 3 (three) times a week. Paint scabs/wounds to left foot and allow to air dry    vitamin D (VITAMIN D3) 1000 units Tab Take 1,000 Units by mouth once daily.    HYDROcodone-acetaminophen (NORCO) 5-325 mg per tablet hydrocodone 5 mg-acetaminophen 325 mg tablet    LORazepam (ATIVAN) 1 MG tablet lorazepam 1 mg tablet    spironolactone (ALDACTONE) 25 MG tablet spironolactone 25 mg tablet    [DISCONTINUED] hydrocortisone 1 % cream  Apply topically as needed.    [DISCONTINUED] metformin (GLUCOPHAGE) 500 MG tablet Take 1 tablet (500 mg total) by mouth 2 (two) times daily with meals.    [DISCONTINUED] metoprolol succinate (TOPROL-XL) 25 MG 24 hr tablet Take 1 tablet (25 mg total) by mouth once daily.    [DISCONTINUED] miconazole (MICOTIN) 2 % cream Apply topically 2 (two) times daily.     Family History    None       Tobacco Use    Smoking status: Never    Smokeless tobacco: Never   Substance and Sexual Activity    Alcohol use: No    Drug use: No    Sexual activity: Never     Review of Systems   Constitutional:  Negative for chills and fever.   HENT:  Negative for congestion, postnasal drip and rhinorrhea.    Eyes:  Negative for visual disturbance.   Respiratory:  Positive for cough and shortness of breath. Negative for chest tightness.    Gastrointestinal:  Negative for abdominal pain, diarrhea, nausea and vomiting.   Genitourinary:  Negative for difficulty urinating.   Musculoskeletal:  Negative for arthralgias and myalgias.   Neurological:  Negative for dizziness and light-headedness.   Hematological:  Does not bruise/bleed easily.   Psychiatric/Behavioral:  Negative for agitation.    Objective:     Vital Signs (Most Recent):  Temp: 97.3 °F (36.3 °C) (09/09/22 1951)  Pulse: 99 (09/09/22 1951)  Resp: 20 (09/09/22 1951)  BP: (!) 148/68 (09/09/22 1951)  SpO2: 96 % (09/09/22 1951)   Vital Signs (24h Range):  Temp:  [97.3 °F (36.3 °C)-98.1 °F (36.7 °C)] 97.3 °F (36.3 °C)  Pulse:  [] 99  Resp:  [16-20] 20  SpO2:  [88 %-97 %] 96 %  BP: (110-148)/(63-80) 148/68     Weight: 74.4 kg (164 lb 0.4 oz)  Body mass index is 21.06 kg/m².    Physical Exam  HENT:      Head: Normocephalic.      Nose: No congestion or rhinorrhea.      Mouth/Throat:      Mouth: Mucous membranes are moist.   Cardiovascular:      Rate and Rhythm: Normal rate.      Pulses: Normal pulses.   Pulmonary:      Effort: No respiratory distress.      Comments: Oxygen nasal  cannula intact   Abdominal:      General: Bowel sounds are normal.      Palpations: Abdomen is soft.   Musculoskeletal:         General: No deformity.      Cervical back: Neck supple.   Skin:     General: Skin is warm and dry.   Neurological:      Mental Status: He is alert.      Comments: Oriented to person    Psychiatric:         Mood and Affect: Mood normal.           Significant Labs: All pertinent labs within the past 24 hours have been reviewed.    Significant Imaging: I have reviewed all pertinent imaging results/findings within the past 24 hours.    Assessment/Plan:     * Acute hypoxemic respiratory failure  Pneumonia of both lungs due to infectious organism  Elevated WBC count    Community acquired pneumonia vs Aspiration pnemonia  Continuous cardiac monitoring  Oxygen  duonebs prn  Continue IV ceftriaxone and azithromycin  Npo  Consult speech therapy     Anticoagulant long-term use  Unsure of why patient is on eliquis  Awaiting to hear back from nursing facility     Elevated troponin  Denies chest pain  EKG pending  Trend troponin x 3       Type 2 diabetes mellitus, controlled  Hemoglobin A1c pending  On trulicity at home   POC glucose checks ac meals and nightly  Low dose SSI PRN  Hypoglycemia protocol PRN  Continue statin     Essential hypertension  Chronic  Continue home amlodipine, lisinopril      VTE Risk Mitigation (From admission, onward)         Ordered     IP VTE HIGH RISK PATIENT  Once         09/09/22 2033     Place sequential compression device  Until discontinued         09/09/22 2033                   Joyce Davila NP  Department of Hospital Medicine   [facility]

## 2022-09-10 NOTE — HPI
77-year-old male with past medical history of diabetes, hypertension, presents from nursing facility with concern for worsening productive cough and shortness of breath over the past few days.  History is somewhat limited as patient is very soft-spoken is unclear what his baseline mentation is, though he does appear tired and frail.  He does deny any pain at this time.  Notes that cough is new in that typically he does not wear oxygen.  According to patient's son, 2 years of short term memory loss, confusion, not very conversational at baseline and often moves his mouth, but doesn't speak.  The son reported that the nursing home called him today and reported that patient had one episode of vomiting and then was noted to have oxygen saturations in the 80s.  He arrived to ED on oxygen.  On exam, coarse breath sounds noted, concerning for aspiration pneumonia vs community acquired pneumonia. Will treat empirically at this time for community-acquired pneumonia as it is unclear if he may have had increased coughing congestion over the past few days.  Labs remarkable for leukocytosis and mildly elevated troponin.  EKG pending. Chest x-ray concerning for new and or intervally progressed bilateral coarse interstitial markings, most prevalent in the left lower lung zone region. Patient admitted to hospital medicine observation service for further medical management.

## 2022-09-10 NOTE — PROGRESS NOTES
[facility]           Pharmacy        Current Drug Shortage     Due to national backorder and [facility] is critically low on inventory of Dextrose 50% (D50) Syringes and Vials, pharmacy has automatically switched from D50% to D10% IVPB at the equivalent dose until resolution of the shortage per P&T approved protocol.               Maddi Tena, PharmD  [phone]

## 2022-09-10 NOTE — ASSESSMENT & PLAN NOTE
Hemoglobin A1c pending  On trulicity at home   POC glucose checks ac meals and nightly  Low dose SSI PRN  Hypoglycemia protocol PRN  Continue statin

## 2022-09-10 NOTE — PLAN OF CARE
1000  CM was informed by nurse Angelina Montgomery that Dr Fabian Layton is questioning the patient's baseline & why he is taking eliquis. Patient is a resident at the [facility]. CM was informed by nurse Alejandra ([phone]) at the [facility] that the pt was admitted to the NH 7/14/2022, is normally AAOx4, verbal, WC bound, swallows without difficulty, eats 50-75% of meals, is incontinent but has good BMs, & is taking eliquis due to hx CVA. Alejandra did not have the date of the CVA. Pt's son, Axel Burgos ([phone]), is aware of the pt's hospitalization & can be contact if more info is needed. Message sent to nurse Angelina Montgomery & Dr Fabian Layton informing of above.

## 2022-09-10 NOTE — ASSESSMENT & PLAN NOTE
Pneumonia of both lungs due to infectious organism  Elevated WBC count    Community acquired pneumonia vs Aspiration pnemonia  Continuous cardiac monitoring  Oxygen  duonebs prn  Continue IV ceftriaxone and azithromycin  Npo  Consult speech therapy

## 2022-09-10 NOTE — SUBJECTIVE & OBJECTIVE
Past Medical History:   Diagnosis Date    Diabetes mellitus     Hypertension     Varicose veins        Past Surgical History:   Procedure Laterality Date    VARICOSE VEIN SURGERY         Review of patient's allergies indicates:  No Known Allergies    No current facility-administered medications on file prior to encounter.     Current Outpatient Medications on File Prior to Encounter   Medication Sig    amLODIPine (NORVASC) 2.5 MG tablet Take 2.5 mg by mouth once daily.    apixaban (ELIQUIS) 5 mg Tab Take 5 mg by mouth 2 (two) times daily.    ascorbic acid, vitamin C, (VITAMIN C) 500 MG tablet Take 500 mg by mouth once daily.    aspirin (ECOTRIN) 81 MG EC tablet Take 81 mg by mouth once daily.    atorvastatin (LIPITOR) 20 MG tablet Take 20 mg by mouth once daily.    dulaglutide (TRULICITY) 0.75 mg/0.5 mL pen injector Inject 0.75 mg into the skin every 7 days.    lisinopriL (PRINIVIL,ZESTRIL) 5 MG tablet Take 5 mg by mouth once daily.    menthol-zinc oxide (CALMOSEPTINE) 0.44-20.6 % Oint Apply topically as needed (redness/irritation). Apply to buttocks/groin    povidone-iodine (BETADINE) 10 % external solution Apply topically 3 (three) times a week. Paint scabs/wounds to left foot and allow to air dry    vitamin D (VITAMIN D3) 1000 units Tab Take 1,000 Units by mouth once daily.    HYDROcodone-acetaminophen (NORCO) 5-325 mg per tablet hydrocodone 5 mg-acetaminophen 325 mg tablet    LORazepam (ATIVAN) 1 MG tablet lorazepam 1 mg tablet    spironolactone (ALDACTONE) 25 MG tablet spironolactone 25 mg tablet    [DISCONTINUED] hydrocortisone 1 % cream Apply topically as needed.    [DISCONTINUED] metformin (GLUCOPHAGE) 500 MG tablet Take 1 tablet (500 mg total) by mouth 2 (two) times daily with meals.    [DISCONTINUED] metoprolol succinate (TOPROL-XL) 25 MG 24 hr tablet Take 1 tablet (25 mg total) by mouth once daily.    [DISCONTINUED] miconazole (MICOTIN) 2 % cream Apply topically 2 (two) times daily.     Family History     None       Tobacco Use    Smoking status: Never    Smokeless tobacco: Never   Substance and Sexual Activity    Alcohol use: No    Drug use: No    Sexual activity: Never     Review of Systems   Constitutional:  Negative for chills and fever.   HENT:  Negative for congestion, postnasal drip and rhinorrhea.    Eyes:  Negative for visual disturbance.   Respiratory:  Positive for cough and shortness of breath. Negative for chest tightness.    Gastrointestinal:  Negative for abdominal pain, diarrhea, nausea and vomiting.   Genitourinary:  Negative for difficulty urinating.   Musculoskeletal:  Negative for arthralgias and myalgias.   Neurological:  Negative for dizziness and light-headedness.   Hematological:  Does not bruise/bleed easily.   Psychiatric/Behavioral:  Negative for agitation.    Objective:     Vital Signs (Most Recent):  Temp: 97.3 °F (36.3 °C) (09/09/22 1951)  Pulse: 99 (09/09/22 1951)  Resp: 20 (09/09/22 1951)  BP: (!) 148/68 (09/09/22 1951)  SpO2: 96 % (09/09/22 1951)   Vital Signs (24h Range):  Temp:  [97.3 °F (36.3 °C)-98.1 °F (36.7 °C)] 97.3 °F (36.3 °C)  Pulse:  [] 99  Resp:  [16-20] 20  SpO2:  [88 %-97 %] 96 %  BP: (110-148)/(63-80) 148/68     Weight: 74.4 kg (164 lb 0.4 oz)  Body mass index is 21.06 kg/m².    Physical Exam  HENT:      Head: Normocephalic.      Nose: No congestion or rhinorrhea.      Mouth/Throat:      Mouth: Mucous membranes are moist.   Cardiovascular:      Rate and Rhythm: Normal rate.      Pulses: Normal pulses.   Pulmonary:      Effort: No respiratory distress.      Comments: Oxygen nasal cannula intact   Abdominal:      General: Bowel sounds are normal.      Palpations: Abdomen is soft.   Musculoskeletal:         General: No deformity.      Cervical back: Neck supple.   Skin:     General: Skin is warm and dry.   Neurological:      Mental Status: He is alert.      Comments: Oriented to person    Psychiatric:         Mood and Affect: Mood normal.           Significant  Labs: All pertinent labs within the past 24 hours have been reviewed.    Significant Imaging: I have reviewed all pertinent imaging results/findings within the past 24 hours.   no

## 2022-09-10 NOTE — PLAN OF CARE
Pt awake and alert. 3L NC. Cardiac monitor maintained. BG monitored. BLE discoloration, pedal pulses present. No complaints of pain, discomfort, nausea, or tingling/numbness. NPO. Encouraged to call with questions/concerns/assistance. Safety.

## 2022-09-10 NOTE — PLAN OF CARE
Problem: SLP  Goal: SLP Goal  Description: STGs:  1. Pt will successfully participate in clinical swallow assessment to determine safest and least restrictive diet level. - ongoing  Outcome: Ongoing, Progressing   Bedside swallow study initiated. Copious wet/junky coughing observed throughout exam. Suction set up at bedside. REC: continue NPO with frequent suctioning and oral hygiene. Meds okay whole, 1 at a time with small sips water. SLP to follow.

## 2022-09-10 NOTE — NURSING
Dr. Layton notified via secure chat that pt's afternoon BP is 92/54. Pt's lisinopril and amlodipine were discontinued. 500 mL bolus and albumin ordered.

## 2022-09-10 NOTE — PLAN OF CARE
Pt is AAOx3. Pt given medications as ordered per MAR. IVFs infusing. IV abx given as scheduled. Cardiac monitoring in place. Blood glucose monitoring maintained. Mepilex applied to left forearm skin tear. Pt is on 2.5 L NC. Safety maintained. Bed alarm set. Instructed to use call light for assistance. Will continue to monitor.         Problem: Adult Inpatient Plan of Care  Goal: Optimal Comfort and Wellbeing  Outcome: Ongoing, Progressing     Problem: Diabetes Comorbidity  Goal: Blood Glucose Level Within Targeted Range  Outcome: Ongoing, Progressing     Problem: Fall Injury Risk  Goal: Absence of Fall and Fall-Related Injury  Outcome: Ongoing, Progressing

## 2022-09-10 NOTE — PT/OT/SLP EVAL
Speech Language Pathology Evaluation  Bedside Swallow    Patient Name:  Axel Burgos   MRN:  [id number]  Admitting Diagnosis: Acute hypoxemic respiratory failure    Recommendations:                 General Recommendations:   Ongoing swallow assessment and dysphagia mgmt  Diet recommendations:  NPO, NPO   Aspiration Precautions:  Frequent oral suctioning w/ cues to cough/clear as needed, Frequent oral care, Meds whole 1 at a time, Monitor for s/s of aspiration, and Strict aspiration precautions   General Precautions: Standard, aspiration, NPO, respiratory  Communication strategies:   Remove background distractions and re-orient as needed.     History:   HPI: 77-year-old male with past medical history of diabetes, hypertension, presents from nursing facility with concern for worsening productive cough and shortness of breath over the past few days.  History is somewhat limited as patient is very soft-spoken is unclear what his baseline mentation is, though he does appear tired and frail.  He does deny any pain at this time.  Notes that cough is new in that typically he does not wear oxygen.  According to patient's son, 2 years of short term memory loss, confusion, not very conversational at baseline and often moves his mouth, but doesn't speak.  The son reported that the nursing home called him today and reported that patient had one episode of vomiting and then was noted to have oxygen saturations in the 80s.  He arrived to ED on oxygen.  On exam, coarse breath sounds noted, concerning for aspiration pneumonia vs community acquired pneumonia. Will treat empirically at this time for community-acquired pneumonia as it is unclear if he may have had increased coughing congestion over the past few days.  Labs remarkable for leukocytosis and mildly elevated troponin.  EKG pending. Chest x-ray concerning for new and or intervally progressed bilateral coarse interstitial markings, most prevalent in the left lower lung zone  "region. Patient admitted to [facility] medicine observation service for further medical management.   Past Medical History:   Diagnosis Date    Diabetes mellitus     Hypertension     Varicose veins        Past Surgical History:   Procedure Laterality Date    VARICOSE VEIN SURGERY         Social History: Patient lives at the [facility], though pt unable to recall living environment today.    Prior Intubation HX:  none this admit.    Modified Barium Swallow: none per EMR.     Chest X-Rays: FINDINGS:   Stable enlargement of cardiopericardial silhouette.  Pulmonary vasculature is felt within limits of normal.  New and or intervally progressed bilateral coarse interstitial markings, most prevalent in the left lower lung zone region..  Though this could relate to progressive fibrosis and or superimposed atelectatic change, intervally developed interstitial infiltrates and or some degree of interstitial edema not excluded.  Clinical correlation.  Ill-defined 1 cm or so density projecting about the right mid lung zone in part superimposed by anterior right rib 3 and posterior right rib 6 as well as scapula and opaque tubing external to the patient is felt most likely related to superimposition artifact, more confluent focus of infiltrate and or atelectasis or subtle parenchymal nodule felt less likely.  This could be further confirmed or excluded when possible with PA and lateral views of the chest..  No obvious pleural fluid blunting right or left costophrenic sulci.  No pneumothorax.  Cardiac event recorder superimposes left hemithorax soft tissues.     Prior diet: Pt unable to recall. Per chart review: "swallows without difficulty, eats 50-75% of meals."    Subjective     Pt seen at the bedside for clinical swallow assessment. SLP did check w/ RNAngelina, prior to visit. Pt alert/awake, resting supine in bed. Audible wet/junky sounding coughing observed upon ST entry.   Patient goals: None stated. Pt did report nausea; " nurse notified.      Pain/Comfort:  Pain Rating 1: 0/10    Respiratory Status: Nasal cannula    Objective:     Oral Musculature Evaluation  Oral Musculature: general weakness  Dentition: scattered dentition (largely edentulous w/ teeth in poor condition)  Secretion Management: coughing on secretions  Mucosal Quality: dry, cracked  Buccal Strength and Mobility: decreased tone  Volitional Swallow:  (appears timely)  Voice Prior to PO Intake:  (wet/junky coughing observed prior to PO trials)  Oral Musculature Comments:  (Limited oral mech exam due to pt nausea/discomfort)    Bedside Swallow Eval: HOB elevated upright. Suction equipment brought to bedside by RN and SLP. SLP provided intermittent suctioning as needed and reviewed purpose/procedure for suctioning w/ pt. Suction left in reach at termination of exam.   Consistencies Assessed:  Thin liquids : straw sips water x1, cup sips water x2      Oral Phase:   Fair labial seal around cup rim and straw, adequate siphon from straw, no oral residue or anterior spillage observed, timely oral transit    Pharyngeal Phase:   Audible coughing/choking observed immediate and delayed following thin liquid trials  Eventual cough with expectoration of yellow/white tinged secretions into [place]  Globus sensation reported  Fairly timely swallow initiation and functional hyolaryngeal lift/excursion to palpation  Voice noted to be intermittently clear/dry, though frequent coughing while talking     Compensatory Strategies  None    Treatment: Pt will benefit from SLP tx 3x/week while inpatient to determine safest and least restrictive diet level + provide dysphagia mgmt.     Assessment:     Axel Burgos is a 77 y.o. male with an admit diagnosis of Acute hypoxemic respiratory failure. Per bedside assessment, pt presents with poor secretion mgmt, immediate and delayed coughing/choking following thin liquids, eventual expectoration of colored secretions, and globus sensation. He is  deemed unsafe for an oral diet at this time. REC: Strict NPO with frequent and aggressive oral care + frequent suctioning. Meds permitted whole, 1 at a time with small sips water. Close monitoring needed for s/sx of aspiration. SLP to follow.     Goals:   Multidisciplinary Problems       SLP Goals          Problem: SLP    Goal Priority Disciplines Outcome   SLP Goal     SLP Ongoing, Progressing   Description: STGs:  1. Pt will successfully participate in clinical swallow assessment to determine safest and least restrictive diet level. - ongoing                       Plan:     Patient to be seen:  3 x/week   Plan of Care expires:  09/25/22  Plan of Care reviewed with:  patient (RN)   SLP Follow-Up:  Yes       Discharge recommendations:   (TBD)   Barriers to Discharge:   NPO status    Time Tracking:     SLP Treatment Date:   09/10/22  Speech Start Time:  1037  Speech Stop Time:  1050     Speech Total Time (min):  13 min    Billable Minutes: Eval Swallow and Oral Function 13 mins    09/10/2022

## 2022-09-11 LAB
POCT GLUCOSE: 122 MG/DL (ref 70–110)
POCT GLUCOSE: 131 MG/DL (ref 70–110)
POCT GLUCOSE: 140 MG/DL (ref 70–110)
POCT GLUCOSE: 145 MG/DL (ref 70–110)

## 2022-09-11 PROCEDURE — 99900035 HC TECH TIME PER 15 MIN (STAT)

## 2022-09-11 PROCEDURE — 94761 N-INVAS EAR/PLS OXIMETRY MLT: CPT

## 2022-09-11 PROCEDURE — P9047 ALBUMIN (HUMAN), 25%, 50ML: HCPCS | Mod: JG | Performed by: INTERNAL MEDICINE

## 2022-09-11 PROCEDURE — 63600175 PHARM REV CODE 636 W HCPCS: Performed by: NURSE PRACTITIONER

## 2022-09-11 PROCEDURE — 96372 THER/PROPH/DIAG INJ SC/IM: CPT | Performed by: INTERNAL MEDICINE

## 2022-09-11 PROCEDURE — 27000221 HC OXYGEN, UP TO 24 HOURS

## 2022-09-11 PROCEDURE — 63600175 PHARM REV CODE 636 W HCPCS: Performed by: INTERNAL MEDICINE

## 2022-09-11 PROCEDURE — 94640 AIRWAY INHALATION TREATMENT: CPT | Mod: XB

## 2022-09-11 PROCEDURE — 21400001 HC TELEMETRY ROOM

## 2022-09-11 PROCEDURE — 25000003 PHARM REV CODE 250: Performed by: NURSE PRACTITIONER

## 2022-09-11 PROCEDURE — 25000242 PHARM REV CODE 250 ALT 637 W/ HCPCS: Performed by: INTERNAL MEDICINE

## 2022-09-11 PROCEDURE — 92526 ORAL FUNCTION THERAPY: CPT

## 2022-09-11 RX ADMIN — DEXTROSE AND SODIUM CHLORIDE: 5; .9 INJECTION, SOLUTION INTRAVENOUS at 09:09

## 2022-09-11 RX ADMIN — ALBUMIN HUMAN 25 G: 0.25 SOLUTION INTRAVENOUS at 08:09

## 2022-09-11 RX ADMIN — IPRATROPIUM BROMIDE AND ALBUTEROL SULFATE 3 ML: 2.5; .5 SOLUTION RESPIRATORY (INHALATION) at 07:09

## 2022-09-11 RX ADMIN — AZITHROMYCIN MONOHYDRATE 500 MG: 500 INJECTION, POWDER, LYOPHILIZED, FOR SOLUTION INTRAVENOUS at 05:09

## 2022-09-11 RX ADMIN — IPRATROPIUM BROMIDE AND ALBUTEROL SULFATE 3 ML: 2.5; .5 SOLUTION RESPIRATORY (INHALATION) at 02:09

## 2022-09-11 RX ADMIN — ASPIRIN 81 MG: 81 TABLET, COATED ORAL at 08:09

## 2022-09-11 RX ADMIN — ENOXAPARIN SODIUM 80 MG: 100 INJECTION SUBCUTANEOUS at 11:09

## 2022-09-11 RX ADMIN — ENOXAPARIN SODIUM 80 MG: 100 INJECTION SUBCUTANEOUS at 01:09

## 2022-09-11 RX ADMIN — IPRATROPIUM BROMIDE AND ALBUTEROL SULFATE 3 ML: 2.5; .5 SOLUTION RESPIRATORY (INHALATION) at 12:09

## 2022-09-11 RX ADMIN — ATORVASTATIN CALCIUM 20 MG: 20 TABLET, FILM COATED ORAL at 08:09

## 2022-09-11 RX ADMIN — IPRATROPIUM BROMIDE AND ALBUTEROL SULFATE 3 ML: 2.5; .5 SOLUTION RESPIRATORY (INHALATION) at 08:09

## 2022-09-11 RX ADMIN — CEFTRIAXONE 1 G: 1 INJECTION, SOLUTION INTRAVENOUS at 09:09

## 2022-09-11 NOTE — ASSESSMENT & PLAN NOTE
Unable to confirm info from nursing facility on multiple attempts by   Will contiue with Lovenox bridging

## 2022-09-11 NOTE — SUBJECTIVE & OBJECTIVE
Interval History:     NC 3 LPM  Oriented to self   Pleasantly confused       Review of Systems   Constitutional: Negative.    HENT: Negative.     Eyes: Negative.    Respiratory:  Positive for cough and shortness of breath.    Cardiovascular: Negative.    Gastrointestinal: Negative.    Genitourinary: Negative.    Musculoskeletal: Negative.    Neurological:  Positive for weakness.   Hematological: Negative.    Psychiatric/Behavioral: Negative.     Objective:     Vital Signs (Most Recent):  Temp: 98.6 °F (37 °C) (09/11/22 1535)  Pulse: 75 (09/11/22 1555)  Resp: 18 (09/11/22 1535)  BP: (!) 106/55 (09/11/22 1535)  SpO2: (!) 94 % (09/11/22 1535)   Vital Signs (24h Range):  Temp:  [97 °F (36.1 °C)-98.6 °F (37 °C)] 98.6 °F (37 °C)  Pulse:  [73-87] 75  Resp:  [16-22] 18  SpO2:  [91 %-100 %] 94 %  BP: ()/(51-62) 106/55     Weight: 75.5 kg (166 lb 7.2 oz)  Body mass index is 21.37 kg/m².    Intake/Output Summary (Last 24 hours) at 9/11/2022 1805  Last data filed at 9/11/2022 0000  Gross per 24 hour   Intake --   Output 100 ml   Net -100 ml        Physical Exam  Constitutional:       Appearance: He is ill-appearing.   HENT:      Head: Normocephalic and atraumatic.      Right Ear: External ear normal.      Left Ear: External ear normal.      Nose: Nose normal.      Mouth/Throat:      Mouth: Mucous membranes are moist.   Eyes:      Pupils: Pupils are equal, round, and reactive to light.   Cardiovascular:      Rate and Rhythm: Normal rate.   Pulmonary:      Effort: Pulmonary effort is normal.      Breath sounds: Rhonchi and rales present.   Abdominal:      General: Abdomen is flat. Bowel sounds are normal.      Palpations: Abdomen is soft.   Musculoskeletal:         General: Normal range of motion.   Skin:     General: Skin is warm.      Capillary Refill: Capillary refill takes less than 2 seconds.   Neurological:      General: No focal deficit present.      Mental Status: He is alert.   Psychiatric:         Mood and Affect:  Mood normal.       Significant Labs: All pertinent labs within the past 24 hours have been reviewed.  BMP:   Recent Labs   Lab 09/10/22  0711   *      K 4.5      CO2 28   BUN 23   CREATININE 0.9   CALCIUM 10.0   MG 2.0       CBC:   Recent Labs   Lab 09/10/22  0711   WBC 13.08*   HGB 11.4*   HCT 35.1*            Significant Imaging: I have reviewed all pertinent imaging results/findings within the past 24 hours.  I have reviewed and interpreted all pertinent imaging results/findings within the past 24 hours.

## 2022-09-11 NOTE — PLAN OF CARE
Problem: Adult Inpatient Plan of Care  Goal: Plan of Care Review  Outcome: Ongoing, Progressing   VN note: Patient chart, labs, and vitals reviewed. VN to continue to be available as needed.

## 2022-09-11 NOTE — ASSESSMENT & PLAN NOTE
Likely aspiration, CAP  Unknown organisms   Empiric Abx  Breathing treatment   Wean of oxygen   RT eval and treat

## 2022-09-11 NOTE — ASSESSMENT & PLAN NOTE
Pneumonia of both lungs due to infectious organism  Elevated WBC count    Community acquired pneumonia vs Aspiration pnemonia  Continuous cardiac monitoring  Oxygen  duonebs prn  Continue IV ceftriaxone and azithromycin

## 2022-09-11 NOTE — SUBJECTIVE & OBJECTIVE
Interval History:     About the same   Feels fatigued   NC @ 3 LPM  Mention he doesn't use oxygen  Mentation at baseline    Review of Systems   Constitutional: Negative.    HENT: Negative.     Eyes: Negative.    Respiratory:  Positive for cough and shortness of breath.    Cardiovascular: Negative.    Gastrointestinal: Negative.    Genitourinary: Negative.    Musculoskeletal: Negative.    Neurological:  Positive for weakness.   Hematological: Negative.    Psychiatric/Behavioral: Negative.     Objective:     Vital Signs (Most Recent):  Temp: 98.5 °F (36.9 °C) (09/10/22 1602)  Pulse: 82 (09/10/22 1602)  Resp: 18 (09/10/22 1600)  BP: (!) 92/54 (09/10/22 1602)  SpO2: (!) 94 % (09/10/22 1600)   Vital Signs (24h Range):  Temp:  [97.9 °F (36.6 °C)-98.5 °F (36.9 °C)] 98.5 °F (36.9 °C)  Pulse:  [81-90] 82  Resp:  [18-19] 18  SpO2:  [91 %-97 %] 94 %  BP: ()/(51-63) 92/54     Weight: 75.8 kg (167 lb)  Body mass index is 21.44 kg/m².    Intake/Output Summary (Last 24 hours) at 9/10/2022 1954  Last data filed at 9/10/2022 0800  Gross per 24 hour   Intake 0 ml   Output --   Net 0 ml      Physical Exam  Constitutional:       Appearance: He is ill-appearing.   HENT:      Head: Normocephalic and atraumatic.      Right Ear: External ear normal.      Left Ear: External ear normal.      Nose: Nose normal.      Mouth/Throat:      Mouth: Mucous membranes are moist.   Eyes:      Pupils: Pupils are equal, round, and reactive to light.   Cardiovascular:      Rate and Rhythm: Normal rate.   Pulmonary:      Effort: Pulmonary effort is normal.      Breath sounds: Rhonchi and rales present.   Abdominal:      General: Abdomen is flat. Bowel sounds are normal.      Palpations: Abdomen is soft.   Musculoskeletal:         General: Normal range of motion.   Skin:     General: Skin is warm.      Capillary Refill: Capillary refill takes less than 2 seconds.   Neurological:      General: No focal deficit present.      Mental Status: He is alert.    Psychiatric:         Mood and Affect: Mood normal.       Significant Labs: All pertinent labs within the past 24 hours have been reviewed.  BMP:   Recent Labs   Lab 09/10/22  0711   *      K 4.5      CO2 28   BUN 23   CREATININE 0.9   CALCIUM 10.0   MG 2.0     CBC:   Recent Labs   Lab 09/09/22  1540 09/10/22  0711   WBC 14.64* 13.08*   HGB 11.9* 11.4*   HCT 36.3* 35.1*    229       Significant Imaging: I have reviewed all pertinent imaging results/findings within the past 24 hours.  I have reviewed and interpreted all pertinent imaging results/findings within the past 24 hours.

## 2022-09-11 NOTE — ASSESSMENT & PLAN NOTE
Hemoglobin A1c 5.9  On trulicity at home   POC glucose checks ac meals and nightly  Low dose SSI PRN  Hypoglycemia protocol PRN  Continue statin

## 2022-09-11 NOTE — PROGRESS NOTES
[facility] Medicine  Progress Note    Patient Name: Axel Burgos  MRN: [id number]  Patient Class: OP- Observation   Admission Date: 9/9/2022  Length of Stay: 0 days  Attending Physician: Fabian Layton MD  Primary Care Provider: Ahsan Guzman MD        Subjective:     Principal Problem:Acute hypoxemic respiratory failure        HPI:  77-year-old male with past medical history of diabetes, hypertension, presents from nursing facility with concern for worsening productive cough and shortness of breath over the past few days.  History is somewhat limited as patient is very soft-spoken is unclear what his baseline mentation is, though he does appear tired and frail.  He does deny any pain at this time.  Notes that cough is new in that typically he does not wear oxygen.  According to patient's son, 2 years of short term memory loss, confusion, not very conversational at baseline and often moves his mouth, but doesn't speak.  The son reported that the nursing home called him today and reported that patient had one episode of vomiting and then was noted to have oxygen saturations in the 80s.  He arrived to ED on oxygen.  On exam, coarse breath sounds noted, concerning for aspiration pneumonia vs community acquired pneumonia. Will treat empirically at this time for community-acquired pneumonia as it is unclear if he may have had increased coughing congestion over the past few days.  Labs remarkable for leukocytosis and mildly elevated troponin.  EKG pending. Chest x-ray concerning for new and or intervally progressed bilateral coarse interstitial markings, most prevalent in the left lower lung zone region. Patient admitted to hospital medicine observation service for further medical management.       Overview/Hospital Course:  77-year-old male with past medical history of diabetes, hypertension, presents from nursing facility with concern for worsening productive cough and shortness of breath over the  past few days.Started empirically on treatment for pneumonia. Concerning for aspiration. SLP guerrero, recommended patient to remain NPO. D5NS started. He also developed hypotension and his home BP meds were discontinued.       Interval History:     About the same   Feels fatigued   NC @ 3 LPM  Mention he doesn't use oxygen  Mentation at baseline    Review of Systems   Constitutional: Negative.    HENT: Negative.     Eyes: Negative.    Respiratory:  Positive for cough and shortness of breath.    Cardiovascular: Negative.    Gastrointestinal: Negative.    Genitourinary: Negative.    Musculoskeletal: Negative.    Neurological:  Positive for weakness.   Hematological: Negative.    Psychiatric/Behavioral: Negative.     Objective:     Vital Signs (Most Recent):  Temp: 98.5 °F (36.9 °C) (09/10/22 1602)  Pulse: 82 (09/10/22 1602)  Resp: 18 (09/10/22 1600)  BP: (!) 92/54 (09/10/22 1602)  SpO2: (!) 94 % (09/10/22 1600)   Vital Signs (24h Range):  Temp:  [97.9 °F (36.6 °C)-98.5 °F (36.9 °C)] 98.5 °F (36.9 °C)  Pulse:  [81-90] 82  Resp:  [18-19] 18  SpO2:  [91 %-97 %] 94 %  BP: ()/(51-63) 92/54     Weight: 75.8 kg (167 lb)  Body mass index is 21.44 kg/m².    Intake/Output Summary (Last 24 hours) at 9/10/2022 1954  Last data filed at 9/10/2022 0800  Gross per 24 hour   Intake 0 ml   Output --   Net 0 ml      Physical Exam  Constitutional:       Appearance: He is ill-appearing.   HENT:      Head: Normocephalic and atraumatic.      Right Ear: External ear normal.      Left Ear: External ear normal.      Nose: Nose normal.      Mouth/Throat:      Mouth: Mucous membranes are moist.   Eyes:      Pupils: Pupils are equal, round, and reactive to light.   Cardiovascular:      Rate and Rhythm: Normal rate.   Pulmonary:      Effort: Pulmonary effort is normal.      Breath sounds: Rhonchi and rales present.   Abdominal:      General: Abdomen is flat. Bowel sounds are normal.      Palpations: Abdomen is soft.   Musculoskeletal:          General: Normal range of motion.   Skin:     General: Skin is warm.      Capillary Refill: Capillary refill takes less than 2 seconds.   Neurological:      General: No focal deficit present.      Mental Status: He is alert.   Psychiatric:         Mood and Affect: Mood normal.       Significant Labs: All pertinent labs within the past 24 hours have been reviewed.  BMP:   Recent Labs   Lab 09/10/22  0711   *      K 4.5      CO2 28   BUN 23   CREATININE 0.9   CALCIUM 10.0   MG 2.0     CBC:   Recent Labs   Lab 09/09/22  1540 09/10/22  0711   WBC 14.64* 13.08*   HGB 11.9* 11.4*   HCT 36.3* 35.1*    229       Significant Imaging: I have reviewed all pertinent imaging results/findings within the past 24 hours.  I have reviewed and interpreted all pertinent imaging results/findings within the past 24 hours.      Assessment/Plan:      * Acute hypoxemic respiratory failure  Pneumonia of both lungs due to infectious organism  Elevated WBC count    Community acquired pneumonia vs Aspiration pnemonia  Continuous cardiac monitoring  Oxygen  duonebs prn  Continue IV ceftriaxone and azithromycin    Anticoagulant long-term use  Unable to confirm info from nursing facility on multiple attempts by   Will contiue with Lovenox bridging     Elevated brain natriuretic peptide (BNP) level        Elevated WBC count        Elevated troponin  Denies chest pain  EKG with no acute ST-T wave changes, RBBB, Sinus arrhythmia   Troponin elevated due to type II demand ischemia   C/w medical management       Pneumonia of both lungs due to infectious organism  Likely aspiration, CAP  Unknown organisms   Empiric Abx  Breathing treatment   Wean of oxygen   RT eval and treat      Anemia of chronic disease        Type 2 diabetes mellitus, controlled  Hemoglobin A1c 5.9  On trulicity at home   POC glucose checks ac meals and nightly  Low dose SSI PRN  Hypoglycemia protocol PRN  Continue statin     Essential  hypertension  BP on lower side   D'C BP meds       VTE Risk Mitigation (From admission, onward)         Ordered     enoxaparin injection 80 mg  Every 12 hours (non-standard times)         09/10/22 1049     IP VTE HIGH RISK PATIENT  Once         09/09/22 2033     Place sequential compression device  Until discontinued         09/09/22 2033                Discharge Planning   JESSI:      Code Status: Full Code   Is the patient medically ready for discharge?:     Reason for patient still in hospital (select all that apply): Patient trending condition                     Fabian Layton MD  Department of Hospital Medicine   [facility]

## 2022-09-11 NOTE — PROGRESS NOTES
[facility] Medicine  Progress Note    Patient Name: Axel Burgos  MRN: [id number]  Patient Class: IP- Inpatient   Admission Date: 9/9/2022  Length of Stay: 0 days  Attending Physician: Fabian Layton MD  Primary Care Provider: Ahsan Guzman MD        Subjective:     Principal Problem:Acute hypoxemic respiratory failure        HPI:  77-year-old male with past medical history of diabetes, hypertension, presents from nursing facility with concern for worsening productive cough and shortness of breath over the past few days.  History is somewhat limited as patient is very soft-spoken is unclear what his baseline mentation is, though he does appear tired and frail.  He does deny any pain at this time.  Notes that cough is new in that typically he does not wear oxygen.  According to patient's son, 2 years of short term memory loss, confusion, not very conversational at baseline and often moves his mouth, but doesn't speak.  The son reported that the nursing home called him today and reported that patient had one episode of vomiting and then was noted to have oxygen saturations in the 80s.  He arrived to ED on oxygen.  On exam, coarse breath sounds noted, concerning for aspiration pneumonia vs community acquired pneumonia. Will treat empirically at this time for community-acquired pneumonia as it is unclear if he may have had increased coughing congestion over the past few days.  Labs remarkable for leukocytosis and mildly elevated troponin.  EKG pending. Chest x-ray concerning for new and or intervally progressed bilateral coarse interstitial markings, most prevalent in the left lower lung zone region. Patient admitted to hospital medicine observation service for further medical management.       Overview/Hospital Course:  77-year-old male with past medical history of diabetes, hypertension, presents from nursing facility with concern for worsening productive cough and shortness of breath over the  past few days.Started empirically on treatment for pneumonia. Concerning for aspiration. SLP ellenal, recommended patient to remain NPO. D5NS started. He also developed hypotension and his home BP meds were discontinued, with improvement in blood pressure.       Interval History:     NC 3 LPM  Oriented to self   Pleasantly confused       Review of Systems   Constitutional: Negative.    HENT: Negative.     Eyes: Negative.    Respiratory:  Positive for cough and shortness of breath.    Cardiovascular: Negative.    Gastrointestinal: Negative.    Genitourinary: Negative.    Musculoskeletal: Negative.    Neurological:  Positive for weakness.   Hematological: Negative.    Psychiatric/Behavioral: Negative.     Objective:     Vital Signs (Most Recent):  Temp: 98.6 °F (37 °C) (09/11/22 1535)  Pulse: 75 (09/11/22 1555)  Resp: 18 (09/11/22 1535)  BP: (!) 106/55 (09/11/22 1535)  SpO2: (!) 94 % (09/11/22 1535)   Vital Signs (24h Range):  Temp:  [97 °F (36.1 °C)-98.6 °F (37 °C)] 98.6 °F (37 °C)  Pulse:  [73-87] 75  Resp:  [16-22] 18  SpO2:  [91 %-100 %] 94 %  BP: ()/(51-62) 106/55     Weight: 75.5 kg (166 lb 7.2 oz)  Body mass index is 21.37 kg/m².    Intake/Output Summary (Last 24 hours) at 9/11/2022 1805  Last data filed at 9/11/2022 0000  Gross per 24 hour   Intake --   Output 100 ml   Net -100 ml        Physical Exam  Constitutional:       Appearance: He is ill-appearing.   HENT:      Head: Normocephalic and atraumatic.      Right Ear: External ear normal.      Left Ear: External ear normal.      Nose: Nose normal.      Mouth/Throat:      Mouth: Mucous membranes are moist.   Eyes:      Pupils: Pupils are equal, round, and reactive to light.   Cardiovascular:      Rate and Rhythm: Normal rate.   Pulmonary:      Effort: Pulmonary effort is normal.      Breath sounds: Rhonchi and rales present.   Abdominal:      General: Abdomen is flat. Bowel sounds are normal.      Palpations: Abdomen is soft.   Musculoskeletal:          General: Normal range of motion.   Skin:     General: Skin is warm.      Capillary Refill: Capillary refill takes less than 2 seconds.   Neurological:      General: No focal deficit present.      Mental Status: He is alert.   Psychiatric:         Mood and Affect: Mood normal.       Significant Labs: All pertinent labs within the past 24 hours have been reviewed.  BMP:   Recent Labs   Lab 09/10/22  0711   *      K 4.5      CO2 28   BUN 23   CREATININE 0.9   CALCIUM 10.0   MG 2.0       CBC:   Recent Labs   Lab 09/10/22  0711   WBC 13.08*   HGB 11.4*   HCT 35.1*            Significant Imaging: I have reviewed all pertinent imaging results/findings within the past 24 hours.  I have reviewed and interpreted all pertinent imaging results/findings within the past 24 hours.      Assessment/Plan:      * Acute hypoxemic respiratory failure  Pneumonia of both lungs due to infectious organism  Elevated WBC count    Community acquired pneumonia vs Aspiration pnemonia  Continuous cardiac monitoring  Oxygen  duonebs prn  Continue IV ceftriaxone and azithromycin    Anticoagulant long-term use  Unable to confirm info from nursing facility on multiple attempts by   Will contiue with Lovenox bridging   Spoke with son he is not sure why he was on AC    Elevated brain natriuretic peptide (BNP) level        Elevated WBC count        Elevated troponin  Denies chest pain  EKG with no acute ST-T wave changes, RBBB, Sinus arrhythmia   Troponin elevated due to type II demand ischemia   C/w medical management       Pneumonia of both lungs due to infectious organism  Likely aspiration, CAP  Unknown organisms   Empiric Abx  Breathing treatment   Wean of oxygen   RT eval and treat      Anemia of chronic disease  H&H stable     Type 2 diabetes mellitus, controlled  Hemoglobin A1c 5.9  He is NPO due to dysaphia, SLP recommendation    On trulicity at home   POC glucose checks ac meals and nightly  Low dose SSI  PRN  Hypoglycemia protocol PRN  Continue statin     Essential hypertension  BP improved  BP meds on hold      VTE Risk Mitigation (From admission, onward)         Ordered     enoxaparin injection 80 mg  Every 12 hours (non-standard times)         09/10/22 1049     IP VTE HIGH RISK PATIENT  Once         09/09/22 2033     Place sequential compression device  Until discontinued         09/09/22 2033                Discharge Planning   JESSI:      Code Status: Full Code   Is the patient medically ready for discharge?:     Reason for patient still in hospital (select all that apply): Patient trending condition                     Fabian Layton MD  Department of Hospital Medicine   [facility]

## 2022-09-11 NOTE — PLAN OF CARE
ST provided ongoing swallow assessment this afternoon. Recommend continue NPO at this time except for ice chips sparingly and sips of water with whole pills presented 1 at a time. ST will continue to follow.

## 2022-09-11 NOTE — ASSESSMENT & PLAN NOTE
Hemoglobin A1c 5.9  He is NPO due to dysaphia, SLP recommendation    On trulicity at home   POC glucose checks ac meals and nightly  Low dose SSI PRN  Hypoglycemia protocol PRN  Continue statin

## 2022-09-11 NOTE — HOSPITAL COURSE
77-year-old male with past medical history of diabetes, hypertension, presents from nursing facility with concern for worsening productive cough and shortness of breath over the past few days.Started empirically on treatment for pneumonia. Concerning for aspiration. SLP eval, recommended patient to remain NPO. D5NS started. He also developed hypotension and his home BP meds were discontinued, with improvement in blood pressure and BP is slowly increasing. Will restart his home BP meds. Passed SLP eval for Puree diet. Palliative consult placed. Hospice appropriate. Patient is from [facility]. Patient and his family didn't decide about hospice. Completed ceftriaxone and azithromycin course for CAP. Discharged to [facility] in stable condition. Pt to follow up with pcp within 1 week.

## 2022-09-11 NOTE — ASSESSMENT & PLAN NOTE
Denies chest pain  EKG with no acute ST-T wave changes, RBBB, Sinus arrhythmia   Troponin elevated due to type II demand ischemia   C/w medical management

## 2022-09-11 NOTE — ASSESSMENT & PLAN NOTE
Unable to confirm info from nursing facility on multiple attempts by   Will contiue with Lovenox bridging   Spoke with son he is not sure why he was on AC

## 2022-09-11 NOTE — PT/OT/SLP PROGRESS
"Speech Language Pathology Treatment    Patient Name:  Axel Burgos   MRN:  [id number]  Admitting Diagnosis: Acute hypoxemic respiratory failure    Recommendations:                 General Recommendations:  Dysphagia therapy  Diet recommendations:  NPO, Liquid Diet Level: NPO (ice chips sparingly; sips of water with whole pills 1x1)   Aspiration Precautions: HOB to 90 degrees, Ice chips sparingly, Meds whole 1 at a time, Monitor for s/s of aspiration, Remain upright 30 minutes post meal, and Strict aspiration precautions   General Precautions: Standard, aspiration, NPO, respiratory  Communication strategies:  provide increased time to answer    Subjective     "I don't eat in the morning."     Patient goals: none stated    Pain/Comfort:  Pain Rating 1: 0/10    Respiratory Status: Nasal cannula, flow 2 L/min    Objective:     Has the patient been evaluated by SLP for swallowing?      Keep patient NPO?   yes  Current Respiratory Status:    NC 2l/m    ST provided ongoing swallow assessment this afternoon after confirming with TIANA Alfaro. RN reported pt more alert today and did take meds with sips of water this morning, however "junky" cough has continued. ST presented pt with 5 ice chips, 1 at a time. Wet throat clear noted immediately after swallow of 5th ice chip. ST also presented pt with  2 tsp sips and 1 cup sip H2O. Delayed wet cough noted approximately 30 seconds following cup sip. Oral suctioning was not required this visit.     Assessment:     Axel Burgos is a 77 y.o. male with an admit diagnosis of acute hypoxemic respiratory failure. He presents with s/s of dysphagia including wet throat clear following multiple ice chip presentations and delayed wet cough following cup sip of thin. Recommend continue strict NPO except for ice chips sparingly and sips of H2O with whole pills presented 1x1. ST educated pt and RN on these recommendations and both acknowledged understanding. ST notified Dr. Fabian Layton of these " recommendations via SecureChat. No response at this time. ST will continue to follow for ongoing swallow assessment.     Goals:   Multidisciplinary Problems       SLP Goals          Problem: SLP    Goal Priority Disciplines Outcome   SLP Goal     SLP Ongoing, Progressing   Description: STGs:  1. Pt will successfully participate in clinical swallow assessment to determine safest and least restrictive diet level. - ongoing 9/11/22                       Plan:     Patient to be seen:  3 x/week   Plan of Care expires:  09/25/22  Plan of Care reviewed with:  patient (RN)   SLP Follow-Up:  Yes       Discharge recommendations:   (TBD)   Barriers to Discharge:  None    Time Tracking:     SLP Treatment Date:   09/11/22  Speech Start Time:  1225  Speech Stop Time:  1245     Speech Total Time (min):  20 min    Billable Minutes: Treatment Swallowing Dysfunction 20    09/11/2022

## 2022-09-12 PROBLEM — R13.19 OTHER DYSPHAGIA: Status: ACTIVE | Noted: 2022-09-12

## 2022-09-12 LAB
ANION GAP SERPL CALC-SCNC: 10 MMOL/L (ref 8–16)
BASOPHILS # BLD AUTO: 0.04 K/UL (ref 0–0.2)
BASOPHILS NFR BLD: 0.4 % (ref 0–1.9)
BUN SERPL-MCNC: 14 MG/DL (ref 8–23)
CALCIUM SERPL-MCNC: 9.6 MG/DL (ref 8.7–10.5)
CHLORIDE SERPL-SCNC: 105 MMOL/L (ref 95–110)
CO2 SERPL-SCNC: 22 MMOL/L (ref 23–29)
CREAT SERPL-MCNC: 0.8 MG/DL (ref 0.5–1.4)
DIFFERENTIAL METHOD: ABNORMAL
EOSINOPHIL # BLD AUTO: 0.3 K/UL (ref 0–0.5)
EOSINOPHIL NFR BLD: 3.3 % (ref 0–8)
ERYTHROCYTE [DISTWIDTH] IN BLOOD BY AUTOMATED COUNT: 16.1 % (ref 11.5–14.5)
EST. GFR  (NO RACE VARIABLE): >60 ML/MIN/1.73 M^2
GLUCOSE SERPL-MCNC: 134 MG/DL (ref 70–110)
HCT VFR BLD AUTO: 33.2 % (ref 40–54)
HGB BLD-MCNC: 10.6 G/DL (ref 14–18)
IMM GRANULOCYTES # BLD AUTO: 0.05 K/UL (ref 0–0.04)
IMM GRANULOCYTES NFR BLD AUTO: 0.5 % (ref 0–0.5)
LYMPHOCYTES # BLD AUTO: 1.5 K/UL (ref 1–4.8)
LYMPHOCYTES NFR BLD: 16.8 % (ref 18–48)
MCH RBC QN AUTO: 32 PG (ref 27–31)
MCHC RBC AUTO-ENTMCNC: 31.9 G/DL (ref 32–36)
MCV RBC AUTO: 100 FL (ref 82–98)
MONOCYTES # BLD AUTO: 0.6 K/UL (ref 0.3–1)
MONOCYTES NFR BLD: 6.7 % (ref 4–15)
NEUTROPHILS # BLD AUTO: 6.6 K/UL (ref 1.8–7.7)
NEUTROPHILS NFR BLD: 72.3 % (ref 38–73)
NRBC BLD-RTO: 0 /100 WBC
PLATELET # BLD AUTO: 212 K/UL (ref 150–450)
PMV BLD AUTO: 11 FL (ref 9.2–12.9)
POCT GLUCOSE: 124 MG/DL (ref 70–110)
POCT GLUCOSE: 132 MG/DL (ref 70–110)
POCT GLUCOSE: 138 MG/DL (ref 70–110)
POCT GLUCOSE: 149 MG/DL (ref 70–110)
POTASSIUM SERPL-SCNC: 5.1 MMOL/L (ref 3.5–5.1)
POTASSIUM SERPL-SCNC: 5.7 MMOL/L (ref 3.5–5.1)
RBC # BLD AUTO: 3.31 M/UL (ref 4.6–6.2)
SODIUM SERPL-SCNC: 137 MMOL/L (ref 136–145)
WBC # BLD AUTO: 9.1 K/UL (ref 3.9–12.7)

## 2022-09-12 PROCEDURE — 92526 ORAL FUNCTION THERAPY: CPT

## 2022-09-12 PROCEDURE — 63600175 PHARM REV CODE 636 W HCPCS: Performed by: NURSE PRACTITIONER

## 2022-09-12 PROCEDURE — 85025 COMPLETE CBC W/AUTO DIFF WBC: CPT | Performed by: INTERNAL MEDICINE

## 2022-09-12 PROCEDURE — 94640 AIRWAY INHALATION TREATMENT: CPT

## 2022-09-12 PROCEDURE — 25000242 PHARM REV CODE 250 ALT 637 W/ HCPCS: Performed by: INTERNAL MEDICINE

## 2022-09-12 PROCEDURE — 36415 COLL VENOUS BLD VENIPUNCTURE: CPT | Performed by: INTERNAL MEDICINE

## 2022-09-12 PROCEDURE — 84132 ASSAY OF SERUM POTASSIUM: CPT | Performed by: INTERNAL MEDICINE

## 2022-09-12 PROCEDURE — 94668 MNPJ CHEST WALL SBSQ: CPT

## 2022-09-12 PROCEDURE — 25000003 PHARM REV CODE 250: Performed by: NURSE PRACTITIONER

## 2022-09-12 PROCEDURE — 99900035 HC TECH TIME PER 15 MIN (STAT)

## 2022-09-12 PROCEDURE — 94761 N-INVAS EAR/PLS OXIMETRY MLT: CPT

## 2022-09-12 PROCEDURE — 94667 MNPJ CHEST WALL 1ST: CPT

## 2022-09-12 PROCEDURE — 80048 BASIC METABOLIC PNL TOTAL CA: CPT | Performed by: INTERNAL MEDICINE

## 2022-09-12 PROCEDURE — 63600175 PHARM REV CODE 636 W HCPCS: Performed by: INTERNAL MEDICINE

## 2022-09-12 PROCEDURE — 21400001 HC TELEMETRY ROOM

## 2022-09-12 PROCEDURE — 63700000 PHARM REV CODE 250 ALT 637 W/O HCPCS: Performed by: INTERNAL MEDICINE

## 2022-09-12 RX ORDER — AZITHROMYCIN 250 MG/1
500 TABLET, FILM COATED ORAL DAILY
Status: COMPLETED | OUTPATIENT
Start: 2022-09-12 | End: 2022-09-16

## 2022-09-12 RX ADMIN — AZITHROMYCIN MONOHYDRATE 500 MG: 250 TABLET ORAL at 12:09

## 2022-09-12 RX ADMIN — ASPIRIN 81 MG: 81 TABLET, COATED ORAL at 08:09

## 2022-09-12 RX ADMIN — CEFTRIAXONE 1 G: 1 INJECTION, SOLUTION INTRAVENOUS at 08:09

## 2022-09-12 RX ADMIN — ENOXAPARIN SODIUM 80 MG: 100 INJECTION SUBCUTANEOUS at 11:09

## 2022-09-12 RX ADMIN — DEXTROSE AND SODIUM CHLORIDE: 5; .9 INJECTION, SOLUTION INTRAVENOUS at 05:09

## 2022-09-12 RX ADMIN — IPRATROPIUM BROMIDE AND ALBUTEROL SULFATE 3 ML: 2.5; .5 SOLUTION RESPIRATORY (INHALATION) at 12:09

## 2022-09-12 RX ADMIN — ATORVASTATIN CALCIUM 20 MG: 20 TABLET, FILM COATED ORAL at 08:09

## 2022-09-12 NOTE — PROGRESS NOTES
[facility] Medicine  Progress Note    Patient Name: Axel Burgos  MRN: [id number]  Patient Class: IP- Inpatient   Admission Date: 9/9/2022  Length of Stay: 1 days  Attending Physician: Fabian Layton MD  Primary Care Provider: Ahsan Guzman MD        Subjective:     Principal Problem:Acute hypoxemic respiratory failure        HPI:  77-year-old male with past medical history of diabetes, hypertension, presents from nursing facility with concern for worsening productive cough and shortness of breath over the past few days.  History is somewhat limited as patient is very soft-spoken is unclear what his baseline mentation is, though he does appear tired and frail.  He does deny any pain at this time.  Notes that cough is new in that typically he does not wear oxygen.  According to patient's son, 2 years of short term memory loss, confusion, not very conversational at baseline and often moves his mouth, but doesn't speak.  The son reported that the nursing home called him today and reported that patient had one episode of vomiting and then was noted to have oxygen saturations in the 80s.  He arrived to ED on oxygen.  On exam, coarse breath sounds noted, concerning for aspiration pneumonia vs community acquired pneumonia. Will treat empirically at this time for community-acquired pneumonia as it is unclear if he may have had increased coughing congestion over the past few days.  Labs remarkable for leukocytosis and mildly elevated troponin.  EKG pending. Chest x-ray concerning for new and or intervally progressed bilateral coarse interstitial markings, most prevalent in the left lower lung zone region. Patient admitted to hospital medicine observation service for further medical management.       Overview/Hospital Course:  77-year-old male with past medical history of diabetes, hypertension, presents from nursing facility with concern for worsening productive cough and shortness of breath over the  past few days.Started empirically on treatment for pneumonia. Concerning for aspiration. SLP eval, recommended patient to remain NPO. D5NS started. He also developed hypotension and his home BP meds were discontinued, with improvement in blood pressure and BP is slowly increasing. Will restart his home BP meds. Passed SLP eval for Puree diet. Palliative consult placed.       Interval History:     NC 3 LPM  Oriented to self   Pleasantly confused       Review of Systems   Constitutional: Negative.    HENT: Negative.     Eyes: Negative.    Respiratory:  Positive for cough and shortness of breath.    Cardiovascular: Negative.    Gastrointestinal: Negative.    Genitourinary: Negative.    Musculoskeletal: Negative.    Skin: Negative.    Neurological:  Positive for weakness.   Hematological: Negative.    Psychiatric/Behavioral: Negative.     Objective:     Vital Signs (Most Recent):  Temp: 97.7 °F (36.5 °C) (09/12/22 1641)  Pulse: 80 (09/12/22 1800)  Resp: 17 (09/12/22 1800)  BP: (!) 140/72 (09/12/22 1641)  SpO2: 96 % (09/12/22 1800)   Vital Signs (24h Range):  Temp:  [96.1 °F (35.6 °C)-98.2 °F (36.8 °C)] 97.7 °F (36.5 °C)  Pulse:  [71-88] 80  Resp:  [14-20] 17  SpO2:  [93 %-96 %] 96 %  BP: (116-140)/(57-72) 140/72     Weight: 75.5 kg (166 lb 7.2 oz)  Body mass index is 21.37 kg/m².  No intake or output data in the 24 hours ending 09/12/22 1842     Physical Exam  Constitutional:       Appearance: He is ill-appearing.   HENT:      Head: Normocephalic and atraumatic.      Right Ear: External ear normal.      Left Ear: External ear normal.      Nose: Nose normal.      Mouth/Throat:      Mouth: Mucous membranes are moist.   Eyes:      Pupils: Pupils are equal, round, and reactive to light.   Cardiovascular:      Rate and Rhythm: Normal rate.   Pulmonary:      Effort: Pulmonary effort is normal.      Breath sounds: Rhonchi and rales present.   Abdominal:      General: Abdomen is flat. Bowel sounds are normal.      Palpations:  Abdomen is soft.   Musculoskeletal:         General: Normal range of motion.   Skin:     General: Skin is warm.      Capillary Refill: Capillary refill takes less than 2 seconds.   Neurological:      General: No focal deficit present.      Mental Status: He is alert.   Psychiatric:         Mood and Affect: Mood normal.       Significant Labs: All pertinent labs within the past 24 hours have been reviewed.  BMP:   Recent Labs   Lab 09/12/22  0900 09/12/22  1128   *  --      --    K 5.7* 5.1     --    CO2 22*  --    BUN 14  --    CREATININE 0.8  --    CALCIUM 9.6  --      CBC:   Recent Labs   Lab 09/12/22  0900   WBC 9.10   HGB 10.6*   HCT 33.2*          Significant Imaging: I have reviewed all pertinent imaging results/findings within the past 24 hours.  I have reviewed and interpreted all pertinent imaging results/findings within the past 24 hours.      Assessment/Plan:      * Acute hypoxemic respiratory failure  Pneumonia of both lungs due to infectious organism  Elevated WBC count    Community acquired pneumonia vs Aspiration pnemonia  Continuous cardiac monitoring  Oxygen  duonebs prn  Continue IV ceftriaxone and azithromycin    Other dysphagia  Probably neurogenic   Initially evaluated by SLP, advised NPO  Repeat SLP recommend puree diet      Anticoagulant long-term use  Unable to confirm info from nursing facility on multiple attempts by   Spoke with son he is not sure why he was on AC  Will start back on Eliquis 5 mg BID    Elevated brain natriuretic peptide (BNP) level        Elevated WBC count        Elevated troponin  Denies chest pain  EKG with no acute ST-T wave changes, RBBB, Sinus arrhythmia   Troponin elevated due to type II demand ischemia   C/w medical management       Pneumonia of both lungs due to infectious organism  Likely aspiration, CAP  Unknown organisms   Empiric Abx Day 4/7  Breathing treatment   Wean of oxygen   RT eval and treat  Added chest  PT  Intermittent suctioning       Mild protein malnutrition  Nutrition consulted.   Most recent weight and BMI monitored                        Measurements:  Wt Readings from Last 1 Encounters:   09/11/22 75.5 kg (166 lb 7.2 oz)   Body mass index is 21.37 kg/m².    Recommendations:      Patient has been screened and assessed by RD. RD will follow patient.      Anemia of chronic disease  H&H stable     Type 2 diabetes mellitus, controlled  Hemoglobin A1c 5.9  On trulicity at home   POC glucose checks ac meals and nightly  Low dose SSI PRN  Hypoglycemia protocol PRN  Continue statin     Essential hypertension  BP slightly elevated   Restart home meds     VTE Risk Mitigation (From admission, onward)         Ordered     enoxaparin injection 80 mg  Every 12 hours (non-standard times)         09/10/22 1049     IP VTE HIGH RISK PATIENT  Once         09/09/22 2033     Place sequential compression device  Until discontinued         09/09/22 2033                Discharge Planning   JESSI:      Code Status: Full Code   Is the patient medically ready for discharge?:     Reason for patient still in hospital (select all that apply): Patient trending condition  Discharge Plan A: Return to nursing home                  Fabian Layton MD  Department of Hospital Medicine   [facility]

## 2022-09-12 NOTE — ASSESSMENT & PLAN NOTE
Likely aspiration, CAP  Unknown organisms   Empiric Abx Day 4/7  Breathing treatment   Wean of oxygen   RT eval and treat  Added chest PT  Intermittent suctioning

## 2022-09-12 NOTE — ASSESSMENT & PLAN NOTE
Nutrition consulted.   Most recent weight and BMI monitored                        Measurements:  Wt Readings from Last 1 Encounters:   09/11/22 75.5 kg (166 lb 7.2 oz)   Body mass index is 21.37 kg/m².    Recommendations:      Patient has been screened and assessed by RD. RD will follow patient.

## 2022-09-12 NOTE — PT/OT/SLP PROGRESS
"Speech Language Pathology Treatment    Patient Name:  Axel Burgos   MRN:  [id number]  Admitting Diagnosis: Acute hypoxemic respiratory failure    Recommendations:                 General Recommendations:  monitor with diet   Diet recommendations:  Puree, Liquid Diet Level: Nectar Thick   Aspiration Precautions: upright for PO, needs assist with feeding and thickening liquids, oral care before and after meals, slow rate, alternate sips and bites    General Precautions: Standard, fall, respiratory, nectar thick, pureed diet  Communication strategies:  yes/no ?, slow to respond    Subjective     Pt seen at bedside for direct dysphagia tx.   Patient goals: "I am just sitting here."    Pain/Comfort:  Pain Rating 1: 0/10    Respiratory Status: nasal cannula     Objective:     Has the patient been evaluated by SLP for swallowing?   Yes  Keep patient NPO? No   Current Respiratory Status:    NC    Swallowing: Pt seen in room, he is seated upright and remains cooperative. He allowed SLP to perform oral care on his mouth. Pt given tsp swallows ice chips, water, nectar thick water and pureed textures.   Oral phase- fair a/p transfer noted, good oral motor control, labial seal maintained and able to contain bolus.   Pharyngeal phase- delayed swallow is noted, throat clear after consecutive sips of water. Pt's voice remains raspy. Occasional cough noted after thin liquids. No coughing or wet voice noted when consuming nectar thick liquids. Will advance pt's diet to nectar thick and pureed textures for now. Palliative consult should be considered to delineate goals of care.     Assessment:     Axel Burgos is a 77 y.o. male admitted with Acute hypoxemic respiratory failure with an SLP diagnosis of improving ability to tolerate an oral diet.     Goals:   Multidisciplinary Problems       SLP Goals          Problem: SLP    Goal Priority Disciplines Outcome   SLP Goal     SLP Ongoing, Progressing   Description: STGs:  1. Pt will " successfully participate in clinical swallow assessment to determine safest and least restrictive diet level. -  2. Pt will tolerate nectar thick liquids and pureed textures with no audible dysphagia signs.                        Plan:     Patient to be seen:  2 x/week, 3 x/week   Plan of Care expires:  09/25/22  Plan of Care reviewed with:  patient   SLP Follow-Up:  Yes       Discharge recommendations:  nursing facility, basic   Barriers to Discharge:  None    Time Tracking:     SLP Treatment Date:   09/12/22  Speech Start Time:  1026  Speech Stop Time:  1042     Speech Total Time (min):  16 min    Billable Minutes: Treatment Swallowing Dysfunction 16    09/12/2022

## 2022-09-12 NOTE — PLAN OF CARE
Venkatesh spoke with pt's son Axel [phone] to discuss d/c planning. Pt resides at [facility]. Axel is agreeable for pt to return to the [facility] at the time of d/c. Venkatesh encouraged Axel to call with any questions or concerns.     Venkatesh sent referral to the [facility] via [facility], so therefore pt can return at the time of d/c.     Venkatesh will continue to follow pt for d/c planning.        09/12/22 0905   Discharge Assessment   Assessment Type Discharge Planning Assessment   Confirmed/corrected address, phone number and insurance Yes   Confirmed Demographics Correct on Facesheet   Source of Information family   Lives With facility resident   Facility Arrived From: [facility]   Do you expect to return to your current living situation? Yes   Do you have help at home or someone to help you manage your care at home? Yes   Readmission within 30 days? No   Patient currently being followed by outpatient case management? No   Do you currently have service(s) that help you manage your care at home? No   Do you take prescription medications? Yes   Do you have prescription coverage? Yes   Coverage [employer]   Do you have any problems affording any of your prescribed medications? No   Is the patient taking medications as prescribed? yes   How do you get to doctors appointments? agency   Discharge Plan A Return to nursing home   DME Needed Upon Discharge    (TBD)   Discharge Plan discussed with: Adult children   Discharge Barriers Identified None   Physical Activity   On average, how many days per week do you engage in moderate to strenuous exercise (like a brisk walk)? Patient refu   On average, how many minutes do you engage in exercise at this level? Patient refu   Financial Resource Strain   How hard is it for you to pay for the very basics like food, housing, medical care, and heating? Patient refu   Housing Stability   In the last 12 months, was there a time when  you were not able to pay the mortgage or rent on time? [place]   In the last 12 months, was there a time when you did not have a steady place to sleep or slept in a shelter (including now)? [place]   Transportation Needs   In the past 12 months, has lack of transportation kept you from medical appointments or from getting medications? Patient refu   In the past 12 months, has lack of transportation kept you from meetings, work, or from getting things needed for daily living? Patient refu   Food Insecurity   Within the past 12 months, you worried that your food would run out before you got the money to buy more. Patient refu   Within the past 12 months, the food you bought just didn't last and you didn't have money to get more. Patient refu   Stress   Do you feel stress - tense, restless, nervous, or anxious, or unable to sleep at night because your mind is troubled all the time - these days? Patient refu   Social Connections   In a typical week, how many times do you talk on the phone with family, friends, or neighbors? Patient refu   How often do you get together with friends or relatives? Patient refu   How often do you attend Religion or Mandaeism services? Patient refu   Do you belong to any clubs or organizations such as Religion groups, unions, fraternal or athletic groups, or school groups? Patient refu   How often do you attend meetings of the clubs or organizations you belong to? Patient refu   Are you , , , , never , or living with a partner? Patient refu   Alcohol Use   Q1: How often do you have a drink containing alcohol? Patient refu   Q2: How many drinks containing alcohol do you have on a typical day when you are drinking? Patient refu   Q3: How often do you have six or more drinks on one occasion? Patient refu   Relationship/Environment   Name(s) of Who Lives With Patient Pt resides at the [facility]

## 2022-09-12 NOTE — PLAN OF CARE
Plan of care reviewed. Patient AOX4 verbalizing understanding of care plan at this time.  Vital signs remains stable. No acute distress noted at this time.  Will continue to monitor.

## 2022-09-12 NOTE — ASSESSMENT & PLAN NOTE
Unable to confirm info from nursing facility on multiple attempts by   Spoke with son he is not sure why he was on AC  Will start back on Eliquis 5 mg BID

## 2022-09-12 NOTE — SUBJECTIVE & OBJECTIVE
Interval History:     NC 3 LPM  Oriented to self   Pleasantly confused       Review of Systems   Constitutional: Negative.    HENT: Negative.     Eyes: Negative.    Respiratory:  Positive for cough and shortness of breath.    Cardiovascular: Negative.    Gastrointestinal: Negative.    Genitourinary: Negative.    Musculoskeletal: Negative.    Skin: Negative.    Neurological:  Positive for weakness.   Hematological: Negative.    Psychiatric/Behavioral: Negative.     Objective:     Vital Signs (Most Recent):  Temp: 97.7 °F (36.5 °C) (09/12/22 1641)  Pulse: 80 (09/12/22 1800)  Resp: 17 (09/12/22 1800)  BP: (!) 140/72 (09/12/22 1641)  SpO2: 96 % (09/12/22 1800)   Vital Signs (24h Range):  Temp:  [96.1 °F (35.6 °C)-98.2 °F (36.8 °C)] 97.7 °F (36.5 °C)  Pulse:  [71-88] 80  Resp:  [14-20] 17  SpO2:  [93 %-96 %] 96 %  BP: (116-140)/(57-72) 140/72     Weight: 75.5 kg (166 lb 7.2 oz)  Body mass index is 21.37 kg/m².  No intake or output data in the 24 hours ending 09/12/22 1842     Physical Exam  Constitutional:       Appearance: He is ill-appearing.   HENT:      Head: Normocephalic and atraumatic.      Right Ear: External ear normal.      Left Ear: External ear normal.      Nose: Nose normal.      Mouth/Throat:      Mouth: Mucous membranes are moist.   Eyes:      Pupils: Pupils are equal, round, and reactive to light.   Cardiovascular:      Rate and Rhythm: Normal rate.   Pulmonary:      Effort: Pulmonary effort is normal.      Breath sounds: Rhonchi and rales present.   Abdominal:      General: Abdomen is flat. Bowel sounds are normal.      Palpations: Abdomen is soft.   Musculoskeletal:         General: Normal range of motion.   Skin:     General: Skin is warm.      Capillary Refill: Capillary refill takes less than 2 seconds.   Neurological:      General: No focal deficit present.      Mental Status: He is alert.   Psychiatric:         Mood and Affect: Mood normal.       Significant Labs: All pertinent labs within the past  24 hours have been reviewed.  BMP:   Recent Labs   Lab 09/12/22  0900 09/12/22  1128   *  --      --    K 5.7* 5.1     --    CO2 22*  --    BUN 14  --    CREATININE 0.8  --    CALCIUM 9.6  --      CBC:   Recent Labs   Lab 09/12/22  0900   WBC 9.10   HGB 10.6*   HCT 33.2*          Significant Imaging: I have reviewed all pertinent imaging results/findings within the past 24 hours.  I have reviewed and interpreted all pertinent imaging results/findings within the past 24 hours.

## 2022-09-12 NOTE — PLAN OF CARE
Plan of care reviewed with patient and family.  Family verbalized understanding and had no further questions.  Patient remains confused at baseline throughout the shift.  Patient receiving IV antibiotics throughout the shift.  Also being turned every two hours with pillows.  Patient now resting comfortably in bed locked in lowest position, side rails up x3, and call bell in reach.  Will continue to monitor.       Problem: Adult Inpatient Plan of Care  Goal: Plan of Care Review  Outcome: Ongoing, Progressing     Problem: Adult Inpatient Plan of Care  Goal: Patient-Specific Goal (Individualized)  Outcome: Ongoing, Progressing

## 2022-09-12 NOTE — PROGRESS NOTES
Pharmacist Intervention IV to PO Note    Axel Burgos is a 77 y.o. male being treated with IV medication azithromycin    Patient Data:    Vital Signs (Most Recent):  Temp: 96.1 °F (35.6 °C) (09/12/22 1200)  Pulse: 80 (09/12/22 1200)  Resp: 18 (09/12/22 1200)  BP: 126/60 (09/12/22 1200)  SpO2: (!) 94 % (09/12/22 1200)   Vital Signs (72h Range):  Temp:  [96.1 °F (35.6 °C)-98.6 °F (37 °C)]   Pulse:  []   Resp:  [16-22]   BP: ()/(51-80)   SpO2:  [88 %-100 %]      CBC:  Recent Labs   Lab 09/09/22  1540 09/10/22  0711 09/12/22  0900   WBC 14.64* 13.08* 9.10   RBC 3.69* 3.55* 3.31*   HGB 11.9* 11.4* 10.6*   HCT 36.3* 35.1* 33.2*    229 212   MCV 98 99* 100*   MCH 32.2* 32.1* 32.0*   MCHC 32.8 32.5 31.9*     CMP:     Recent Labs   Lab 09/09/22  1540 09/10/22  0711 09/12/22  0900   * 145* 134*   CALCIUM 9.9 10.0 9.6   ALBUMIN 2.8* 2.4*  --    PROT 7.2 6.4  --     137 137   K 4.4 4.5 5.7*   CO2 29 28 22*    100 105   BUN 20 23 14   CREATININE 0.8 0.9 0.8   ALKPHOS 90 87  --    ALT 27 25  --    AST 23 22  --    BILITOT 2.0* 1.5*  --        Dietary Orders:  Diet Orders            Dietary nutrition supplements [facility]; Thickener starting at 09/12 1200    Diet Dysphagia Pureed (IDDSI Level 4) Nectar Thick: Dysphagia 1 (Pureed) starting at 09/12 1101            Based on the following criteria, this patient qualifies for intravenous to oral conversion:  [x] The patients gastrointestinal tract is functioning (tolerating medications via oral or enteral route for 24 hours and tolerating food or enteral feeds for 24 hours).  [x] The patient is hemodynamically stable for 24 hours (heart rate <100 beats per minute, systolic blood pressure >99 mm Hg, and respiratory rate <20 breaths per minute).  [x] The patient shows clinical improvement (afebrile for at least 24 hours and white blood cell count downtrending or normalized). Additionally, the patient must be non-neutropenic (absolute  neutrophil count >500 cells/mm3).  [x] For antimicrobials, the patient has received IV therapy for at least 24 hours.    IV medication azithromycin will be changed to oral medication azithromycin    Pharmacist's Name: Brenda Ramos  Pharmacist's Extension: [phone]

## 2022-09-12 NOTE — PLAN OF CARE
Problem: SLP  Goal: SLP Goal  Description: STGs:  1. Pt will successfully participate in clinical swallow assessment to determine safest and least restrictive diet level. -  2. Pt will tolerate nectar thick liquids and pureed textures with no audible dysphagia signs.   Outcome: Ongoing, Progressing   Pt to ne advanced of diet upgrade to pureed and NECTAR thick liquids. MD and RN made aware

## 2022-09-13 PROBLEM — Z51.5 PALLIATIVE CARE ENCOUNTER: Status: ACTIVE | Noted: 2022-09-13

## 2022-09-13 LAB
ANION GAP SERPL CALC-SCNC: 9 MMOL/L (ref 8–16)
BASOPHILS # BLD AUTO: 0.07 K/UL (ref 0–0.2)
BASOPHILS NFR BLD: 0.7 % (ref 0–1.9)
BUN SERPL-MCNC: 12 MG/DL (ref 8–23)
CALCIUM SERPL-MCNC: 9.7 MG/DL (ref 8.7–10.5)
CHLORIDE SERPL-SCNC: 105 MMOL/L (ref 95–110)
CO2 SERPL-SCNC: 27 MMOL/L (ref 23–29)
CREAT SERPL-MCNC: 0.7 MG/DL (ref 0.5–1.4)
DIFFERENTIAL METHOD: ABNORMAL
EOSINOPHIL # BLD AUTO: 0.3 K/UL (ref 0–0.5)
EOSINOPHIL NFR BLD: 3.5 % (ref 0–8)
ERYTHROCYTE [DISTWIDTH] IN BLOOD BY AUTOMATED COUNT: 15.7 % (ref 11.5–14.5)
EST. GFR  (NO RACE VARIABLE): >60 ML/MIN/1.73 M^2
GLUCOSE SERPL-MCNC: 122 MG/DL (ref 70–110)
HCT VFR BLD AUTO: 33.8 % (ref 40–54)
HGB BLD-MCNC: 11.2 G/DL (ref 14–18)
IMM GRANULOCYTES # BLD AUTO: 0.13 K/UL (ref 0–0.04)
IMM GRANULOCYTES NFR BLD AUTO: 1.4 % (ref 0–0.5)
LYMPHOCYTES # BLD AUTO: 2.4 K/UL (ref 1–4.8)
LYMPHOCYTES NFR BLD: 25.6 % (ref 18–48)
MCH RBC QN AUTO: 32.8 PG (ref 27–31)
MCHC RBC AUTO-ENTMCNC: 33.1 G/DL (ref 32–36)
MCV RBC AUTO: 99 FL (ref 82–98)
MONOCYTES # BLD AUTO: 0.7 K/UL (ref 0.3–1)
MONOCYTES NFR BLD: 7.5 % (ref 4–15)
NEUTROPHILS # BLD AUTO: 5.8 K/UL (ref 1.8–7.7)
NEUTROPHILS NFR BLD: 61.3 % (ref 38–73)
NRBC BLD-RTO: 0 /100 WBC
PLATELET # BLD AUTO: 294 K/UL (ref 150–450)
PMV BLD AUTO: 10.7 FL (ref 9.2–12.9)
POCT GLUCOSE: 130 MG/DL (ref 70–110)
POCT GLUCOSE: 131 MG/DL (ref 70–110)
POCT GLUCOSE: 145 MG/DL (ref 70–110)
POCT GLUCOSE: 153 MG/DL (ref 70–110)
POTASSIUM SERPL-SCNC: 3.5 MMOL/L (ref 3.5–5.1)
RBC # BLD AUTO: 3.41 M/UL (ref 4.6–6.2)
SODIUM SERPL-SCNC: 141 MMOL/L (ref 136–145)
WBC # BLD AUTO: 9.44 K/UL (ref 3.9–12.7)

## 2022-09-13 PROCEDURE — 80048 BASIC METABOLIC PNL TOTAL CA: CPT | Performed by: INTERNAL MEDICINE

## 2022-09-13 PROCEDURE — 94668 MNPJ CHEST WALL SBSQ: CPT

## 2022-09-13 PROCEDURE — 63700000 PHARM REV CODE 250 ALT 637 W/O HCPCS: Performed by: INTERNAL MEDICINE

## 2022-09-13 PROCEDURE — 36415 COLL VENOUS BLD VENIPUNCTURE: CPT | Performed by: INTERNAL MEDICINE

## 2022-09-13 PROCEDURE — 85025 COMPLETE CBC W/AUTO DIFF WBC: CPT | Performed by: INTERNAL MEDICINE

## 2022-09-13 PROCEDURE — 25000003 PHARM REV CODE 250: Performed by: NURSE PRACTITIONER

## 2022-09-13 PROCEDURE — 63600175 PHARM REV CODE 636 W HCPCS: Performed by: NURSE PRACTITIONER

## 2022-09-13 PROCEDURE — 63600175 PHARM REV CODE 636 W HCPCS: Performed by: INTERNAL MEDICINE

## 2022-09-13 PROCEDURE — 21400001 HC TELEMETRY ROOM

## 2022-09-13 PROCEDURE — 99223 PR INITIAL HOSPITAL CARE,LEVL III: ICD-10-PCS | Mod: ,,,

## 2022-09-13 PROCEDURE — 99497 PR ADVNCD CARE PLAN 30 MIN: ICD-10-PCS | Mod: 25,,,

## 2022-09-13 PROCEDURE — 99497 ADVNCD CARE PLAN 30 MIN: CPT | Mod: 25,,,

## 2022-09-13 PROCEDURE — 99223 1ST HOSP IP/OBS HIGH 75: CPT | Mod: ,,,

## 2022-09-13 RX ORDER — BENZONATATE 100 MG/1
100 CAPSULE ORAL 3 TIMES DAILY PRN
Status: DISCONTINUED | OUTPATIENT
Start: 2022-09-13 | End: 2022-09-16 | Stop reason: HOSPADM

## 2022-09-13 RX ORDER — AMLODIPINE BESYLATE 2.5 MG/1
2.5 TABLET ORAL DAILY
Status: DISCONTINUED | OUTPATIENT
Start: 2022-09-14 | End: 2022-09-16 | Stop reason: HOSPADM

## 2022-09-13 RX ADMIN — ENOXAPARIN SODIUM 80 MG: 100 INJECTION SUBCUTANEOUS at 12:09

## 2022-09-13 RX ADMIN — ATORVASTATIN CALCIUM 20 MG: 20 TABLET, FILM COATED ORAL at 09:09

## 2022-09-13 RX ADMIN — AZITHROMYCIN MONOHYDRATE 500 MG: 250 TABLET ORAL at 09:09

## 2022-09-13 RX ADMIN — CEFTRIAXONE 1 G: 1 INJECTION, SOLUTION INTRAVENOUS at 09:09

## 2022-09-13 RX ADMIN — BENZONATATE 100 MG: 100 CAPSULE ORAL at 08:09

## 2022-09-13 RX ADMIN — DEXTROSE AND SODIUM CHLORIDE: 5; .9 INJECTION, SOLUTION INTRAVENOUS at 01:09

## 2022-09-13 RX ADMIN — ASPIRIN 81 MG: 81 TABLET, COATED ORAL at 09:09

## 2022-09-13 NOTE — PLAN OF CARE
Venkatesh was informed that pt will most likely be returning to the [facility] with hospice.     Venkatesh attempted to call Tiff [phone] ex [phone] with the [facility]; no answer noted. Venkatesh left voicemail. Venkatesh will left voicemail.     Venkatesh attempted to call Josue [phone] with the [facility]; no answer noted. Sw left voicemail. Sw will left voicemail.     Venkatesh spoke with Romelia [phone] with [facility]. Romelia stated they do provide hospice services at the [facility]. Romelia stated she will reach out to pt's son Axel and answer any of his questions.     Venkatesh will continue to follow pt for d/c planning.        09/13/22 6488   Post-Acute Status   Post-Acute Authorization Hospice

## 2022-09-13 NOTE — HPI
"Per chart, "77-year-old male with past medical history of diabetes, hypertension, presents from nursing facility with concern for worsening productive cough and shortness of breath over the past few days.  History is somewhat limited as patient is very soft-spoken is unclear what his baseline mentation is, though he does appear tired and frail.  He does deny any pain at this time.  Notes that cough is new in that typically he does not wear oxygen.  According to patient's son, 2 years of short term memory loss, confusion, not very conversational at baseline and often moves his mouth, but doesn't speak.  The son reported that the nursing home called him today and reported that patient had one episode of vomiting and then was noted to have oxygen saturations in the 80s.  He arrived to ED on oxygen.  On exam, coarse breath sounds noted, concerning for aspiration pneumonia vs community acquired pneumonia. Will treat empirically at this time for community-acquired pneumonia as it is unclear if he may have had increased coughing congestion over the past few days.  Labs remarkable for leukocytosis and mildly elevated troponin.  EKG pending. Chest x-ray concerning for new and or intervally progressed bilateral coarse interstitial markings, most prevalent in the left lower lung zone region. Patient admitted to hospital medicine observation service for further medical management. "        "

## 2022-09-13 NOTE — CONSULTS
[facility]  Palliative Medicine  Consult Note    Patient Name: Axel Burgos  MRN: [id number]  Admission Date: 9/9/2022  Hospital Length of Stay: 2 days  Code Status: Full Code   Attending Provider: Fabian Layton MD  Consulting Provider: Georgina Barrett NP  Primary Care Physician: Ahsan Guzman MD  Principal Problem:Acute hypoxemic respiratory failure    Patient information was obtained from patient, relative(s), past medical records and primary team.      Inpatient consult to Palliative Care  Consult performed by: Georgina Barrett NP  Consult ordered by: Fabian Layton MD  Reason for consult: Goals of Care/Advanced Care Planning/ End of life including Hospice discussion         Assessment/Plan:     Palliative care encounter  At time of initial visit, pt resting in bed watching tv. Pt AAO to self and place only.  Pt is unsure of his age, year, or where he lives.  Pt is able to identify his son Axel as his MPOA and the person who assists with his care.  Pt can follow simple commands and answer simple conversational questions butn ot sure of the validity of these responses.  Pt denies any pain or discomfort at this time.  Pt is a resident of the [facility]. Pt served in the [employer].     Reached out to pts son Axel for collateral information.  Per Axel, pt has been is a steady state of decline over the past three years with a drastic decline over the past six months.  Per son, pt no longer participates in PT/OT.  Pt can no longer walk with assistive devices and spends most of his time in bed watching tv. Pt is bed bound with the occasional wheelchair use requiring a lift to transfer pt. Per Axel,  mentally the patient is no longer able to comprehend his own medical complexities and the pts son wonders if the pt thinks that he is nearing the end of his life based off of recent comments. Pts overall mentation has drastically declined over the past year as well.  Emotional support offered.     We  "discussed goals of care.  Axel endorses that he wants his dad to have the comfort focused care but does not want to sacrifice the quality of care either.  Hospice was introduced  up by name.  We discussed what hospice would  look like for the pt.  We discussed that the patient would remain a resident of the [facility] and receive additional hospice services.  Pts son was interested in learning more about hospice and asked to speak with someone directly from the hospice company that works with the [facility]. Care Management notified and will help facilitate.      Pt is hospice eligible for diagnosis of end stage dementia should the family decide to go this route.               Subjective:     HPI:   Per chart, "77-year-old male with past medical history of diabetes, hypertension, presents from nursing facility with concern for worsening productive cough and shortness of breath over the past few days.  History is somewhat limited as patient is very soft-spoken is unclear what his baseline mentation is, though he does appear tired and frail.  He does deny any pain at this time.  Notes that cough is new in that typically he does not wear oxygen.  According to patient's son, 2 years of short term memory loss, confusion, not very conversational at baseline and often moves his mouth, but doesn't speak.  The son reported that the nursing home called him today and reported that patient had one episode of vomiting and then was noted to have oxygen saturations in the 80s.  He arrived to ED on oxygen.  On exam, coarse breath sounds noted, concerning for aspiration pneumonia vs community acquired pneumonia. Will treat empirically at this time for community-acquired pneumonia as it is unclear if he may have had increased coughing congestion over the past few days.  Labs remarkable for leukocytosis and mildly elevated troponin.  EKG pending. Chest x-ray concerning for new and or intervally progressed bilateral coarse " "interstitial markings, most prevalent in the left lower lung zone region. Patient admitted to hospital medicine observation service for further medical management. "            Interval History: "Started empirically on treatment for pneumonia. Concerning for aspiration. SLP eval, recommended patient to remain NPO. D5NS started. He also developed hypotension and his home BP meds were discontinued, with improvement in blood pressure and BP is slowly increasing. Will restart his home BP meds. Passed SLP eval for Puree diet. Palliative consult placed. "  "  Past Medical History:   Diagnosis Date    Anticoagulant long-term use     Diabetes mellitus     Hypertension     Stroke     Varicose veins        Past Surgical History:   Procedure Laterality Date    VARICOSE VEIN SURGERY         Review of patient's allergies indicates:  No Known Allergies    Medications:  Continuous Infusions:   dextrose 5 % and 0.9 % NaCl 50 mL/hr at 09/13/22 0145     Scheduled Meds:   aspirin  81 mg Oral Daily    atorvastatin  20 mg Oral Daily    azithromycin  500 mg Oral Daily    cefTRIAXone (ROCEPHIN) IVPB  1 g Intravenous Q24H    enoxaparin  1 mg/kg Subcutaneous Q12H     PRN Meds:albuterol-ipratropium, dextrose 10%, dextrose 10%, glucagon (human recombinant), glucose, glucose, influenza, insulin aspart U-100, naloxone, ondansetron, promethazine, sodium chloride 0.9%    Family History    None       Tobacco Use    Smoking status: Never    Smokeless tobacco: Never   Substance and Sexual Activity    Alcohol use: No    Drug use: No    Sexual activity: Never       Review of Systems   Constitutional:  Positive for activity change.   HENT: Negative.     Respiratory:  Positive for cough and shortness of breath.    Cardiovascular: Negative.    Gastrointestinal: Negative.    Endocrine: Negative.    Genitourinary: Negative.    Objective:     Vital Signs (Most Recent):  Temp: 98.1 °F (36.7 °C) (09/13/22 1558)  Pulse: 76 (09/13/22 " 1558)  Resp: 18 (09/13/22 1558)  BP: (!) 141/73 (09/13/22 1558)  SpO2: 97 % (09/13/22 1558)   Vital Signs (24h Range):  Temp:  [97.7 °F (36.5 °C)-98.3 °F (36.8 °C)] 98.1 °F (36.7 °C)  Pulse:  [74-96] 76  Resp:  [14-20] 18  SpO2:  [94 %-97 %] 97 %  BP: (130-158)/(68-78) 141/73     Weight: 73 kg (160 lb 15 oz)  Body mass index is 20.66 kg/m².    Physical Exam  Constitutional:       General: He is not in acute distress.     Appearance: He is ill-appearing (chronically).   HENT:      Head: Normocephalic.   Cardiovascular:      Rate and Rhythm: Normal rate.   Pulmonary:      Effort: Pulmonary effort is normal.      Breath sounds: Rales present.      Comments: Cough   Abdominal:      General: Abdomen is flat. Bowel sounds are normal.      Palpations: Abdomen is soft.   Skin:     General: Skin is warm.      Capillary Refill: Capillary refill takes less than 2 seconds.      Coloration: Skin is pale.      Findings: Bruising present.   Neurological:      Mental Status: He is alert. Mental status is at baseline. He is disoriented.   Psychiatric:         Mood and Affect: Mood normal.         Speech: Speech is delayed.         Behavior: Behavior is cooperative.         Cognition and Memory: Memory is impaired.       Review of Symptoms      Symptom Assessment (ESAS 0-10 Scale)  Pain:  0  Dyspnea:  0  Anxiety:  0  Nausea:  0  Depression:  0  Anorexia:  0  Fatigue:  0  Insomnia:  0  Restlessness:  0  Agitation:  0     CAM / Delirium:  Negative  Constipation:  Negative      ECOG Performance Status rdGrdrrdarddrderd:rd rd3rd Living Arrangements:  Lives in nursing home    Psychosocial/Cultural: Pt is a resident of the [facility].  Pt has a son, Axel.  Pt is retired from the [employer].     Spiritual:  F - Ember and Belief:  Adventist   I - Importance:  Moderate   C - Community:  Family support   A - Address in Care:  Pastoral visits PRN      Time-Based Charting:  Yes  Chart Review: 20 minutes  Face to Face: 18 minutes  Symptom Assessment:  10 minutes  Coordination of Care: 10 minutes  Discharge Planning: 15 minutes  Advance Care Planning: 10 minutes  Goals of Care: 9 minutes    Total Time Spent: 92 minutes      Advance Care Planning   Advance Directives:   Living Will: No    LaPOST: No    Do Not Resuscitate Status: No    Medical Power of : No    Agent's Name:  Axel Flood   Agent's Contact Number:  [phone]    Decision Making:  Patient answered questions and Family answered questions       Significant Labs: All pertinent labs within the past 24 hours have been reviewed.  CBC:   Recent Labs   Lab 09/13/22  0507   WBC 9.44   HGB 11.2*   HCT 33.8*   MCV 99*        BMP:  Recent Labs   Lab 09/13/22  0507   *      K 3.5      CO2 27   BUN 12   CREATININE 0.7   CALCIUM 9.7     LFT:  Lab Results   Component Value Date    AST 22 09/10/2022    ALKPHOS 87 09/10/2022    BILITOT 1.5 (H) 09/10/2022     Albumin:   Albumin   Date Value Ref Range Status   09/10/2022 2.4 (L) 3.5 - 5.2 g/dL Final     Protein:   Total Protein   Date Value Ref Range Status   09/10/2022 6.4 6.0 - 8.4 g/dL Final     Lactic acid:   Lab Results   Component Value Date    LACTATE 2.2 07/18/2017       Significant Imaging: I have reviewed all pertinent imaging results/findings within the past 24 hours.            Georgina Barrett NP  Palliative Medicine  [facility]      Advance Care Planning     Date: 09/13/2022    [facility]  I engaged the family in a conversation about advance care planning and we specifically addressed what the goals of care would be moving forward, in light of the patient's change in clinical status, specifically end stage dementia.  We did not specifically address the patient's likely prognosis, which is poor.  We explored the patient's values and preferences for future care.  The family and healthcare power of   endorses that what is most important right now is to focus on avoiding the hospital, symptom/pain control, quality of  life, even if it means sacrificing a little time and comfort and QOL     Accordingly, we have decided that the best plan to meet the patient's goals includes enrolling in hospice care    I did explain the role for hospice care at this stage of the patient's illness, including its ability to help the patient live with the best quality of life possible.  We will be making a hospice referral.    I spent a total of 19 minutes engaging the patient in this advance care planning discussion.

## 2022-09-13 NOTE — PLAN OF CARE
Recommendation:   1. Add Boost pudding BID and Boost Glucose Control BID.   2. Encourage intake at meals as tolerated.   3. Monitor weight/labs.   4. RD to follow to monitor po intake    Goals:   Pt will tolerate diet with at least 50-75% intake at meals by RD follow up  Nutrition Goal Status: new

## 2022-09-13 NOTE — CONSULTS
"  [facility] S[facility]  Adult Nutrition  Consult Note    SUMMARY     Recommendations    Recommendation:   1. Add Boost pudding BID and Boost Glucose Control BID.   2. Encourage intake at meals as tolerated.   3. Monitor weight/labs.   4. RD to follow to monitor po intake    Goals:   Pt will tolerate diet with at least 50-75% intake at meals by RD follow up  Nutrition Goal Status: new  Communication of RD Recs: reviewed with RN    Assessment and Plan  * Acute hypoxemic respiratory failure  Contributing Nutrition Diagnosis  Inadequate energy intake    Related to (etiology):   dysphagia    Signs and Symptoms (as evidenced by):   Decreased intake at meals, pureed diet with nectar thick liquids    Interventions:  Collaboration with other providers  [employer] beverage    Nutrition Diagnosis Status:   New      Malnutrition Assessment  Unable to assess NFPE 2/2 pt asleep at visit       Reason for Assessment  Reason For Assessment: consult (poor oral intake)  Diagnosis:  (acute resp failure)  Relevant Medical History: DM, HTN, stroke, varicose vein surgery  General Information Comments: Pt on dysphagia pureed diet ewith nectar thick liquids. Noted 25% intake of breakfast today. Pt was asleep at visit-unable to assess NFPE. Carrillo 14- L arm skin tear. Noted 4lb weight loss since admit.  Nutrition Discharge Planning: d/c diet per SLP recs    Nutrition Risk Screen  Nutrition Risk Screen: dysphagia or difficulty swallowing    Nutrition/Diet History  Food Preferences: no Muslim or cultural food prefs identified  Spiritual, Cultural Beliefs, Congregational Practices, Values that Affect Care: no  Factors Affecting Nutritional Intake: difficulty/impaired swallowing    Anthropometrics  Temp: 98 °F (36.7 °C)  Height: 6' 2" (188 cm)  Height (inches): 74 in  Weight Method: Bed Scale  Weight: 73 kg (160 lb 15 oz)  Weight (lb): 160.94 lb  Ideal Body Weight (IBW), Male: 190 lb  % Ideal Body Weight, Male (lb): 84.71 %  BMI (Calculated): " 20.7  BMI Grade: 18.5-24.9 - normal     Lab/Procedures/Meds  Pertinent Labs Reviewed: reviewed  Pertinent Labs Comments: GLu 122H, Alb 2.4L  Pertinent Medications Reviewed: reviewed  Pertinent Medications Comments: aspirin, azithromycin, rocephin, 5% dex at 50    Estimated/Assessed Needs  Weight Used For Calorie Calculations: 73 kg (160 lb 15 oz)  Energy Calorie Requirements (kcal): 2190 (30 kcal/kg)  Energy Need Method: Kcal/kg  Protein Requirements: 73g (1.0g/kg)  Weight Used For Protein Calculations: 73 kg (160 lb 15 oz)  Estimated Fluid Requirement Method: RDA Method  RDA Method (mL): 2190  CHO Requirement: 225g    Nutrition Prescription Ordered  Current Diet Order: dysphagia pureed nectar thick liquids    Evaluation of Received Nutrient/Fluid Intake  I/O: none recorded  Energy Calories Required: not meeting needs  Protein Required: not meeting needs  Fluid Required: not meeting needs  Comments: LBM 9/8  % Intake of Estimated Energy Needs: 0 - 25 %  % Meal Intake: 0 - 25 %    Nutrition Risk  Level of Risk/Frequency of Follow-up:  (2xweekly)     Monitor and Evaluation  Food and Nutrient Intake: food and beverage intake  Food and Nutrient Adminstration: diet order  Physical Activity and Function: nutrition-related ADLs and IADLs  Anthropometric Measurements: weight  Biochemical Data, Medical Tests and Procedures: electrolyte and renal panel  Nutrition-Focused Physical Findings: overall appearance     Nutrition Follow-Up  RD Follow-up?: Yes

## 2022-09-13 NOTE — SUBJECTIVE & OBJECTIVE
"Interval History: "Started empirically on treatment for pneumonia. Concerning for aspiration. SLP eval, recommended patient to remain NPO. D5NS started. He also developed hypotension and his home BP meds were discontinued, with improvement in blood pressure and BP is slowly increasing. Will restart his home BP meds. Passed SLP eval for Puree diet. Palliative consult placed. "  "  Past Medical History:   Diagnosis Date    Anticoagulant long-term use     Diabetes mellitus     Hypertension     Stroke     Varicose veins        Past Surgical History:   Procedure Laterality Date    VARICOSE VEIN SURGERY         Review of patient's allergies indicates:  No Known Allergies    Medications:  Continuous Infusions:   dextrose 5 % and 0.9 % NaCl 50 mL/hr at 09/13/22 0145     Scheduled Meds:   aspirin  81 mg Oral Daily    atorvastatin  20 mg Oral Daily    azithromycin  500 mg Oral Daily    cefTRIAXone (ROCEPHIN) IVPB  1 g Intravenous Q24H    enoxaparin  1 mg/kg Subcutaneous Q12H     PRN Meds:albuterol-ipratropium, dextrose 10%, dextrose 10%, glucagon (human recombinant), glucose, glucose, influenza, insulin aspart U-100, naloxone, ondansetron, promethazine, sodium chloride 0.9%    Family History    None       Tobacco Use    Smoking status: Never    Smokeless tobacco: Never   Substance and Sexual Activity    Alcohol use: No    Drug use: No    Sexual activity: Never       Review of Systems   Constitutional:  Positive for activity change.   HENT: Negative.     Respiratory:  Positive for cough and shortness of breath.    Cardiovascular: Negative.    Gastrointestinal: Negative.    Endocrine: Negative.    Genitourinary: Negative.    Objective:     Vital Signs (Most Recent):  Temp: 98.1 °F (36.7 °C) (09/13/22 1558)  Pulse: 76 (09/13/22 1558)  Resp: 18 (09/13/22 1558)  BP: (!) 141/73 (09/13/22 1558)  SpO2: 97 % (09/13/22 1558)   Vital Signs (24h Range):  Temp:  [97.7 °F (36.5 °C)-98.3 °F (36.8 °C)] 98.1 °F (36.7 °C)  Pulse:  [74-96] " 76  Resp:  [14-20] 18  SpO2:  [94 %-97 %] 97 %  BP: (130-158)/(68-78) 141/73     Weight: 73 kg (160 lb 15 oz)  Body mass index is 20.66 kg/m².    Physical Exam  Constitutional:       General: He is not in acute distress.     Appearance: He is ill-appearing (chronically).   HENT:      Head: Normocephalic.   Cardiovascular:      Rate and Rhythm: Normal rate.   Pulmonary:      Effort: Pulmonary effort is normal.      Breath sounds: Rales present.      Comments: Cough   Abdominal:      General: Abdomen is flat. Bowel sounds are normal.      Palpations: Abdomen is soft.   Skin:     General: Skin is warm.      Capillary Refill: Capillary refill takes less than 2 seconds.      Coloration: Skin is pale.      Findings: Bruising present.   Neurological:      Mental Status: He is alert. Mental status is at baseline. He is disoriented.   Psychiatric:         Mood and Affect: Mood normal.         Speech: Speech is delayed.         Behavior: Behavior is cooperative.         Cognition and Memory: Memory is impaired.       Review of Symptoms      Symptom Assessment (ESAS 0-10 Scale)  Pain:  0  Dyspnea:  0  Anxiety:  0  Nausea:  0  Depression:  0  Anorexia:  0  Fatigue:  0  Insomnia:  0  Restlessness:  0  Agitation:  0     CAM / Delirium:  Negative  Constipation:  Negative      ECOG Performance Status rdGrdrrdarddrderd:rd rd3rd Living Arrangements:  Lives in nursing home    Psychosocial/Cultural: Pt is a resident of the [facility].  Pt has a son, Axel.  Pt is retired from the [employer].     Spiritual:  F - Ember and Belief:  Church   I - Importance:  Moderate   C - Community:  Family support   A - Address in Care:  Pastoral visits PRN      Time-Based Charting:  Yes  Chart Review: 20 minutes  Face to Face: 18 minutes  Symptom Assessment: 10 minutes  Coordination of Care: 10 minutes  Discharge Planning: 15 minutes  Advance Care Planning: 10 minutes  Goals of Care: 9 minutes    Total Time Spent: 92 minutes      Advance Care Planning    Advance Directives:   Living Will: No    LaPOST: No    Do Not Resuscitate Status: No    Medical Power of : No    Agent's Name:  Axel Flood   Agent's Contact Number:  [phone]    Decision Making:  Patient answered questions and Family answered questions       Significant Labs: All pertinent labs within the past 24 hours have been reviewed.  CBC:   Recent Labs   Lab 09/13/22  0507   WBC 9.44   HGB 11.2*   HCT 33.8*   MCV 99*        BMP:  Recent Labs   Lab 09/13/22  0507   *      K 3.5      CO2 27   BUN 12   CREATININE 0.7   CALCIUM 9.7     LFT:  Lab Results   Component Value Date    AST 22 09/10/2022    ALKPHOS 87 09/10/2022    BILITOT 1.5 (H) 09/10/2022     Albumin:   Albumin   Date Value Ref Range Status   09/10/2022 2.4 (L) 3.5 - 5.2 g/dL Final     Protein:   Total Protein   Date Value Ref Range Status   09/10/2022 6.4 6.0 - 8.4 g/dL Final     Lactic acid:   Lab Results   Component Value Date    LACTATE 2.2 07/18/2017       Significant Imaging: I have reviewed all pertinent imaging results/findings within the past 24 hours.

## 2022-09-13 NOTE — ASSESSMENT & PLAN NOTE
Contributing Nutrition Diagnosis  Inadequate energy intake    Related to (etiology):   dysphagia    Signs and Symptoms (as evidenced by):   Decreased intake at meals, pureed diet with nectar thick liquids    Interventions:  Collaboration with other providers  [employer] beverage    Nutrition Diagnosis Status:   New

## 2022-09-13 NOTE — ASSESSMENT & PLAN NOTE
At time of initial visit, pt resting in bed watching tv. Pt AAO to self and place only.  Pt is unsure of his age, year, or where he lives.  Pt is able to identify his son Axel as his MPOA and the person who assists with his care.  Pt can follow simple commands and answer simple conversational questions butn ot sure of the validity of these responses.  Pt denies any pain or discomfort at this time.  Pt is a resident of the [facility]. Pt served in the [employer].     Reached out to pts son Axel for collateral information.  Per Axel, pt has been is a steady state of decline over the past three years with a drastic decline over the past six months.  Per son, pt no longer participates in PT/OT.  Pt can no longer walk with assistive devices and spends most of his time in bed watching tv. Pt is bed bound with the occasional wheelchair use requiring a lift to transfer pt. Per Axel,  mentally the patient is no longer able to comprehend his own medical complexities and the pts son wonders if the pt thinks that he is nearing the end of his life based off of recent comments. Pts overall mentation has drastically declined over the past year as well.  Emotional support offered.     We discussed goals of care.  Axel endorses that he wants his dad to have the comfort focused care but does not want to sacrifice the quality of care either.  Hospice was introduced  up by name.  We discussed what hospice would  look like for the pt.  We discussed that the patient would remain a resident of the [facility] and receive additional hospice services.  Pts son was interested in learning more about hospice and asked to speak with someone directly from the hospice company that works with the [facility]. Care Management notified and will help facilitate.      Pt is hospice eligible for diagnosis of end stage dementia should the family decide to go this route.

## 2022-09-13 NOTE — PLAN OF CARE
Pt. Sleeping between nursing rounds. Aroused by voice. Pt. Intermittently disoriented to situation and time. Dysphagia pureed diet tolerated well with assist feeds and nectar thick liquids. IVF infusing per MAR. No c/o pain or N/V. PT. Very quiet and withdrawn and has a slow response rate to questions. Tele monitored. BS monitored. Bed alarm on and call light in reach. Pt. Instructed to call for assistance.   Problem: Adult Inpatient Plan of Care  Goal: Plan of Care Review  9/13/2022 1752 by Edita Dangelo RN  Outcome: Ongoing, Progressing  9/13/2022 1752 by Edita Dangelo RN  Outcome: Ongoing, Progressing     Problem: Adult Inpatient Plan of Care  Goal: Patient-Specific Goal (Individualized)  9/13/2022 1752 by Edita Dangelo RN  Outcome: Ongoing, Progressing  9/13/2022 1752 by Edita Dangelo RN  Outcome: Ongoing, Progressing     Problem: Adult Inpatient Plan of Care  Goal: Absence of Hospital-Acquired Illness or Injury  9/13/2022 1752 by Edita Dangelo RN  Outcome: Ongoing, Progressing  9/13/2022 1752 by Edita Dangelo RN  Outcome: Ongoing, Progressing

## 2022-09-13 NOTE — PLAN OF CARE
Plan of care reviewed with patient. Patient lying in bed AOX2 to 3. verbalized understanding of medications and plan of care. Patient continues to receive IV antibiotics. Patient continues to be turned every 2 hours. Vital signs remains stable. No acute distress noted at this time. Will continue to monitor.

## 2022-09-14 PROBLEM — R79.89 ELEVATED TROPONIN: Status: RESOLVED | Noted: 2022-09-09 | Resolved: 2022-09-14

## 2022-09-14 PROBLEM — J96.01 ACUTE HYPOXEMIC RESPIRATORY FAILURE: Status: RESOLVED | Noted: 2022-09-09 | Resolved: 2022-09-14

## 2022-09-14 PROBLEM — D72.829 ELEVATED WBC COUNT: Status: RESOLVED | Noted: 2022-09-09 | Resolved: 2022-09-14

## 2022-09-14 PROBLEM — R79.89 ELEVATED BRAIN NATRIURETIC PEPTIDE (BNP) LEVEL: Status: RESOLVED | Noted: 2022-09-09 | Resolved: 2022-09-14

## 2022-09-14 LAB
ANION GAP SERPL CALC-SCNC: 8 MMOL/L (ref 8–16)
BASOPHILS # BLD AUTO: 0.07 K/UL (ref 0–0.2)
BASOPHILS NFR BLD: 0.6 % (ref 0–1.9)
BUN SERPL-MCNC: 12 MG/DL (ref 8–23)
CALCIUM SERPL-MCNC: 8.9 MG/DL (ref 8.7–10.5)
CHLORIDE SERPL-SCNC: 103 MMOL/L (ref 95–110)
CO2 SERPL-SCNC: 26 MMOL/L (ref 23–29)
CREAT SERPL-MCNC: 0.7 MG/DL (ref 0.5–1.4)
DIFFERENTIAL METHOD: ABNORMAL
EOSINOPHIL # BLD AUTO: 0.3 K/UL (ref 0–0.5)
EOSINOPHIL NFR BLD: 2.9 % (ref 0–8)
ERYTHROCYTE [DISTWIDTH] IN BLOOD BY AUTOMATED COUNT: 15.6 % (ref 11.5–14.5)
EST. GFR  (NO RACE VARIABLE): >60 ML/MIN/1.73 M^2
GLUCOSE SERPL-MCNC: 127 MG/DL (ref 70–110)
HCT VFR BLD AUTO: 29.3 % (ref 40–54)
HGB BLD-MCNC: 9.5 G/DL (ref 14–18)
IMM GRANULOCYTES # BLD AUTO: 0.16 K/UL (ref 0–0.04)
IMM GRANULOCYTES NFR BLD AUTO: 1.5 % (ref 0–0.5)
LYMPHOCYTES # BLD AUTO: 3.1 K/UL (ref 1–4.8)
LYMPHOCYTES NFR BLD: 28.7 % (ref 18–48)
MCH RBC QN AUTO: 31.4 PG (ref 27–31)
MCHC RBC AUTO-ENTMCNC: 32.4 G/DL (ref 32–36)
MCV RBC AUTO: 97 FL (ref 82–98)
MONOCYTES # BLD AUTO: 0.7 K/UL (ref 0.3–1)
MONOCYTES NFR BLD: 6.7 % (ref 4–15)
NEUTROPHILS # BLD AUTO: 6.5 K/UL (ref 1.8–7.7)
NEUTROPHILS NFR BLD: 59.6 % (ref 38–73)
NRBC BLD-RTO: 0 /100 WBC
PLATELET # BLD AUTO: 260 K/UL (ref 150–450)
PMV BLD AUTO: 9.8 FL (ref 9.2–12.9)
POCT GLUCOSE: 120 MG/DL (ref 70–110)
POCT GLUCOSE: 148 MG/DL (ref 70–110)
POCT GLUCOSE: 158 MG/DL (ref 70–110)
POCT GLUCOSE: 165 MG/DL (ref 70–110)
POCT GLUCOSE: 176 MG/DL (ref 70–110)
POTASSIUM SERPL-SCNC: 3.1 MMOL/L (ref 3.5–5.1)
RBC # BLD AUTO: 3.03 M/UL (ref 4.6–6.2)
SODIUM SERPL-SCNC: 137 MMOL/L (ref 136–145)
WBC # BLD AUTO: 10.84 K/UL (ref 3.9–12.7)

## 2022-09-14 PROCEDURE — 99900035 HC TECH TIME PER 15 MIN (STAT)

## 2022-09-14 PROCEDURE — 63600175 PHARM REV CODE 636 W HCPCS: Performed by: INTERNAL MEDICINE

## 2022-09-14 PROCEDURE — 21400001 HC TELEMETRY ROOM

## 2022-09-14 PROCEDURE — 63700000 PHARM REV CODE 250 ALT 637 W/O HCPCS: Performed by: INTERNAL MEDICINE

## 2022-09-14 PROCEDURE — 94668 MNPJ CHEST WALL SBSQ: CPT

## 2022-09-14 PROCEDURE — 25000003 PHARM REV CODE 250: Performed by: NURSE PRACTITIONER

## 2022-09-14 PROCEDURE — 94761 N-INVAS EAR/PLS OXIMETRY MLT: CPT

## 2022-09-14 PROCEDURE — 63600175 PHARM REV CODE 636 W HCPCS: Performed by: NURSE PRACTITIONER

## 2022-09-14 PROCEDURE — 36415 COLL VENOUS BLD VENIPUNCTURE: CPT | Performed by: INTERNAL MEDICINE

## 2022-09-14 PROCEDURE — 85025 COMPLETE CBC W/AUTO DIFF WBC: CPT | Performed by: INTERNAL MEDICINE

## 2022-09-14 PROCEDURE — 80048 BASIC METABOLIC PNL TOTAL CA: CPT | Performed by: INTERNAL MEDICINE

## 2022-09-14 RX ORDER — POTASSIUM CHLORIDE 7.45 MG/ML
10 INJECTION INTRAVENOUS
Status: COMPLETED | OUTPATIENT
Start: 2022-09-14 | End: 2022-09-14

## 2022-09-14 RX ADMIN — DEXTROSE AND SODIUM CHLORIDE: 5; .9 INJECTION, SOLUTION INTRAVENOUS at 06:09

## 2022-09-14 RX ADMIN — POTASSIUM CHLORIDE 10 MEQ: 7.46 INJECTION, SOLUTION INTRAVENOUS at 03:09

## 2022-09-14 RX ADMIN — POTASSIUM CHLORIDE 10 MEQ: 7.46 INJECTION, SOLUTION INTRAVENOUS at 01:09

## 2022-09-14 RX ADMIN — ENOXAPARIN SODIUM 80 MG: 100 INJECTION SUBCUTANEOUS at 11:09

## 2022-09-14 RX ADMIN — AZITHROMYCIN MONOHYDRATE 500 MG: 250 TABLET ORAL at 08:09

## 2022-09-14 RX ADMIN — ENOXAPARIN SODIUM 80 MG: 100 INJECTION SUBCUTANEOUS at 12:09

## 2022-09-14 RX ADMIN — BENZONATATE 100 MG: 100 CAPSULE ORAL at 11:09

## 2022-09-14 RX ADMIN — ATORVASTATIN CALCIUM 20 MG: 20 TABLET, FILM COATED ORAL at 08:09

## 2022-09-14 RX ADMIN — ASPIRIN 81 MG: 81 TABLET, COATED ORAL at 08:09

## 2022-09-14 RX ADMIN — CEFTRIAXONE 1 G: 1 INJECTION, SOLUTION INTRAVENOUS at 08:09

## 2022-09-14 NOTE — ASSESSMENT & PLAN NOTE
Likely aspiration, CAP  Unknown organisms   Empiric Abx Day 5/7  Breathing treatment   Wean of oxygen   RT eval and treat  C/w chest PT  Intermittent suctioning

## 2022-09-14 NOTE — PROGRESS NOTES
[facility] Medicine  Progress Note    Patient Name: Axel Burgos  MRN: [id number]  Patient Class: IP- Inpatient   Admission Date: 9/9/2022  Length of Stay: 3 days  Attending Physician: Isauro Combs MD  Primary Care Provider: Ahsan Guzman MD        Subjective:     Principal Problem:Palliative care encounter        HPI:  77-year-old male with past medical history of diabetes, hypertension, presents from nursing facility with concern for worsening productive cough and shortness of breath over the past few days.  History is somewhat limited as patient is very soft-spoken is unclear what his baseline mentation is, though he does appear tired and frail.  He does deny any pain at this time.  Notes that cough is new in that typically he does not wear oxygen.  According to patient's son, 2 years of short term memory loss, confusion, not very conversational at baseline and often moves his mouth, but doesn't speak.  The son reported that the nursing home called him today and reported that patient had one episode of vomiting and then was noted to have oxygen saturations in the 80s.  He arrived to ED on oxygen.  On exam, coarse breath sounds noted, concerning for aspiration pneumonia vs community acquired pneumonia. Will treat empirically at this time for community-acquired pneumonia as it is unclear if he may have had increased coughing congestion over the past few days.  Labs remarkable for leukocytosis and mildly elevated troponin.  EKG pending. Chest x-ray concerning for new and or intervally progressed bilateral coarse interstitial markings, most prevalent in the left lower lung zone region. Patient admitted to hospital medicine observation service for further medical management.       Overview/Hospital Course:   77-year-old male with past medical history of diabetes, hypertension, presents from nursing facility with concern for worsening productive cough and shortness of breath over the past  few days.Started empirically on treatment for pneumonia. Concerning for aspiration. SLP eval, recommended patient to remain NPO. D5NS started. He also developed hypotension and his home BP meds were discontinued, with improvement in blood pressure and BP is slowly increasing. Will restart his home BP meds. Passed SLP eval for Puree diet. Palliative consult placed. Hospice appropriate. Patient is from [facility]. CTM to arrange for dispo.       Interval History: Patient seen and examined on bed.  Patient lying comfortably in the bed.  No acute event happened overnight.  Patient not in distress.       Review of Systems   Constitutional:  Negative for fever.   HENT:  Negative for congestion.    Respiratory:  Negative for shortness of breath.    Cardiovascular:  Negative for chest pain.   Gastrointestinal:  Negative for abdominal pain.   Genitourinary:  Negative for dysuria.   Musculoskeletal:  Negative for back pain.   Neurological:  Negative for syncope.   Psychiatric/Behavioral:  Negative for agitation.    Objective:     Vital Signs (Most Recent):  Temp: 98.5 °F (36.9 °C) (09/14/22 1537)  Pulse: 93 (09/14/22 1547)  Resp: 17 (09/14/22 1547)  BP: (!) 104/59 (09/14/22 1537)  SpO2: 95 % (09/14/22 1547)   Vital Signs (24h Range):  Temp:  [97.8 °F (36.6 °C)-98.8 °F (37.1 °C)] 98.5 °F (36.9 °C)  Pulse:  [80-99] 93  Resp:  [12-19] 17  SpO2:  [91 %-95 %] 95 %  BP: (100-142)/(57-75) 104/59     Weight: 76.4 kg (168 lb 6.9 oz)  Body mass index is 21.63 kg/m².    Intake/Output Summary (Last 24 hours) at 9/14/2022 1802  Last data filed at 9/14/2022 1756  Gross per 24 hour   Intake 1643.92 ml   Output --   Net 1643.92 ml      Physical Exam  Constitutional:       Appearance: Normal appearance.   HENT:      Head: Normocephalic.      Nose: Nose normal.      Mouth/Throat:      Mouth: Mucous membranes are moist.   Cardiovascular:      Rate and Rhythm: Normal rate.   Pulmonary:      Effort: No respiratory distress.   Abdominal:       Tenderness: There is no abdominal tenderness.   Musculoskeletal:         General: No swelling.      Cervical back: Normal range of motion.   Skin:     General: Skin is warm.      Capillary Refill: Capillary refill takes less than 2 seconds.   Neurological:      Mental Status: Mental status is at baseline.   Psychiatric:         Behavior: Behavior normal.       Significant Labs: All pertinent labs within the past 24 hours have been reviewed.    Significant Imaging: I have reviewed all pertinent imaging results/findings within the past 24 hours.    Assessment/Plan:      * Palliative care encounter  Palliative care on board and pt was deemed appropriate for hospice   Case management on board and spoke to [facility]   Patient has been waiting for placement         Pneumonia of both lungs due to infectious organism  Likely aspiration, CAP  Continue ceftriaxone and azithromycin       Other dysphagia  Probably neurogenic   SLP recommend puree diet  Son had concerns regarding diet; will reconsult SLP about recommendations     Anticoagulant long-term use  Unable to confirm info from nursing facility on multiple attempts by   Spoke with son he is not sure why he was on AC  C./w Eliquis 5 mg BID    Mild protein malnutrition  Nutrition consulted.   Most recent weight and BMI monitored                        Measurements:  Wt Readings from Last 1 Encounters:   09/13/22 76.4 kg (168 lb 6.9 oz)   Body mass index is 21.63 kg/m².    Recommendations: Recommendation/Intervention: 1. Add Boost pudding BID and Boost Glucose Control BID. 2. Encourage intake at meals as tolerated. 3. Monitor weight/labs. 4. RD to follow to monitor po intake  Goals: Pt will tolerate diet with at least 50-75% intake at meals by RD follow up    Patient has been screened and assessed by RD. RD will follow patient.      Anemia of chronic disease  H&H stable     Type 2 diabetes mellitus, controlled  Hemoglobin A1c 5.9  On trulicity at home    POC glucose checks ac meals and nightly  Low dose SSI PRN  Hypoglycemia protocol PRN  Continue statin    Essential hypertension  BP slightly elevated   Restart home meds     VTE Risk Mitigation (From admission, onward)           Ordered     enoxaparin injection 80 mg  Every 12 hours (non-standard times)         09/10/22 1049     IP VTE HIGH RISK PATIENT  Once         09/09/22 2033     Place sequential compression device  Until discontinued         09/09/22 2033                    Discharge Planning   JESSI:      Code Status: Full Code   Is the patient medically ready for discharge?:     Reason for patient still in hospital (select all that apply): Pending disposition  Discharge Plan A: Hospice/home   Discharge Delays: None known at this time              Isauro Combs MD  Department of Hospital Medicine   [facility]

## 2022-09-14 NOTE — ASSESSMENT & PLAN NOTE
Nutrition consulted.   Most recent weight and BMI monitored                        Measurements:  Wt Readings from Last 1 Encounters:   09/13/22 73 kg (160 lb 15 oz)   Body mass index is 20.66 kg/m².    Recommendations: Recommendation/Intervention: 1. Add Boost pudding BID and Boost Glucose Control BID. 2. Encourage intake at meals as tolerated. 3. Monitor weight/labs. 4. RD to follow to monitor po intake  Goals: Pt will tolerate diet with at least 50-75% intake at meals by RD follow up    Patient has been screened and assessed by RD. RD will follow patient.

## 2022-09-14 NOTE — NURSING
Patient is resting comfortably in bed, AAOX3, disoriented to time, room air, vitals WNL, diaper on, needed to be changed, completed, D5 1/2NS running at 50ml/hr, all safety precautions are in place, call bell and tray table are within reach of the patient and will continue to monitor.    I have read notes about confusion but received in report that he seems more alert at night than during the day. He was AAOX3 for me tonight.

## 2022-09-14 NOTE — PLAN OF CARE
Venkatesh spoke with Eileen with [facility]. Per Eileen, she met with pt's son Axel about hospice services for pt. Eileen stated that Axel is unsure about hospice services for pt and will talk with family about it. Per Eileen, Axel will touch base with her in the morning after speaking with family.     Venkatesh will continue to follow pt for d/c planning.        09/14/22 2561   Post-Acute Status   Post-Acute Authorization Hospice

## 2022-09-14 NOTE — PLAN OF CARE
Problem: Diabetes Comorbidity  Goal: Blood Glucose Level Within Targeted Range  Outcome: Ongoing, Progressing     Problem: Hypertension Comorbidity  Goal: Blood Pressure in Desired Range  Outcome: Ongoing, Progressing     Problem: Fluid Imbalance (Pneumonia)  Goal: Fluid Balance  Outcome: Ongoing, Progressing     Problem: Infection (Pneumonia)  Goal: Resolution of Infection Signs and Symptoms  Outcome: Ongoing, Progressing     Problem: Respiratory Compromise (Pneumonia)  Goal: Effective Oxygenation and Ventilation  Outcome: Ongoing, Progressing     Problem: Acute Confusion (Hospitalized Older Adult)  Goal: Optimal Cognitive Function  Outcome: Ongoing, Progressing     Problem: Bowel Elimination Impaired (Hospitalized Older Adult)  Goal: Effective Bowel Elimination  Outcome: Ongoing, Progressing     Problem: Depression (Hospitalized Older Adult)  Goal: Depressive Symptoms Identified and Managed  Outcome: Ongoing, Progressing     Problem: Fluid Imbalance (Hospitalized Older Adult)  Goal: Fluid Balance  Outcome: Ongoing, Progressing     Problem: Functional Ability Impaired (Hospitalized Older Adult)  Goal: Optimal Functional Ability  Outcome: Ongoing, Progressing     Problem: Oral Intake Inadequate (Hospitalized Older Adult)  Goal: Optimal Nutrition Intake  Outcome: Ongoing, Progressing     Problem: Sleep Disturbance (Hospitalized Older Adult)  Goal: Adequate Sleep/Rest  Outcome: Ongoing, Progressing     Problem: Urinary Incontinence (Hospitalized Older Adult)  Goal: Urinary Incontinence Managed  Outcome: Ongoing, Progressing     Problem: Skin Injury Risk Increased  Goal: Skin Health and Integrity  Outcome: Ongoing, Progressing     Problem: Fall Injury Risk  Goal: Absence of Fall and Fall-Related Injury  Outcome: Ongoing, Progressing     Problem: Impaired Wound Healing  Goal: Optimal Wound Healing  Outcome: Ongoing, Progressing     Problem: Coping Ineffective  Goal: Effective Coping  Outcome: Ongoing, Progressing

## 2022-09-14 NOTE — PLAN OF CARE
Pt on room air in no apparent distress.  CPT tx. Given with good pt. Effort.  Will cont. To monitor.

## 2022-09-14 NOTE — SUBJECTIVE & OBJECTIVE
Interval History:     NC 3 LPM  Oriented to self   Pleasantly confused       Review of Systems   Constitutional: Negative.    HENT: Negative.     Eyes: Negative.    Respiratory:  Positive for cough.    Cardiovascular: Negative.    Gastrointestinal: Negative.    Genitourinary: Negative.    Musculoskeletal: Negative.    Skin: Negative.    Neurological:  Positive for weakness.   Hematological: Negative.    Psychiatric/Behavioral: Negative.     Objective:     Vital Signs (Most Recent):  Temp: 98.3 °F (36.8 °C) (09/13/22 1933)  Pulse: 83 (09/13/22 1933)  Resp: 19 (09/13/22 1933)  BP: (!) 142/75 (09/13/22 1933)  SpO2: (!) 93 % (09/13/22 1933)   Vital Signs (24h Range):  Temp:  [97.9 °F (36.6 °C)-98.3 °F (36.8 °C)] 98.3 °F (36.8 °C)  Pulse:  [74-83] 83  Resp:  [18-20] 19  SpO2:  [93 %-97 %] 93 %  BP: (130-158)/(68-78) 142/75     Weight: 73 kg (160 lb 15 oz)  Body mass index is 20.66 kg/m².    Intake/Output Summary (Last 24 hours) at 9/13/2022 2025  Last data filed at 9/13/2022 1758  Gross per 24 hour   Intake 3844.71 ml   Output --   Net 3844.71 ml        Physical Exam  Constitutional:       Appearance: He is ill-appearing.   HENT:      Head: Normocephalic and atraumatic.      Right Ear: External ear normal.      Left Ear: External ear normal.      Nose: Nose normal.      Mouth/Throat:      Mouth: Mucous membranes are moist.   Eyes:      Pupils: Pupils are equal, round, and reactive to light.   Cardiovascular:      Rate and Rhythm: Normal rate.   Pulmonary:      Effort: Pulmonary effort is normal.      Breath sounds: Rhonchi and rales present.   Abdominal:      General: Abdomen is flat. Bowel sounds are normal.      Palpations: Abdomen is soft.   Musculoskeletal:         General: Normal range of motion.   Skin:     General: Skin is warm.      Capillary Refill: Capillary refill takes less than 2 seconds.   Neurological:      General: No focal deficit present.      Mental Status: He is alert.   Psychiatric:         Mood and  Affect: Mood normal.       Significant Labs: All pertinent labs within the past 24 hours have been reviewed.  BMP:   Recent Labs   Lab 09/13/22  0507   *      K 3.5      CO2 27   BUN 12   CREATININE 0.7   CALCIUM 9.7     CBC:   Recent Labs   Lab 09/12/22  0900 09/13/22  0507   WBC 9.10 9.44   HGB 10.6* 11.2*   HCT 33.2* 33.8*    294       Significant Imaging: I have reviewed all pertinent imaging results/findings within the past 24 hours.  I have reviewed and interpreted all pertinent imaging results/findings within the past 24 hours.

## 2022-09-14 NOTE — ASSESSMENT & PLAN NOTE
Unable to confirm info from nursing facility on multiple attempts by   Spoke with son he is not sure why he was on AC  C./w Eliquis 5 mg BID

## 2022-09-14 NOTE — SUBJECTIVE & OBJECTIVE
Interval History: Patient seen and examined on bed.  Patient lying comfortably in the bed.  No acute event happened overnight.  Patient not in distress.       Review of Systems   Constitutional:  Negative for fever.   HENT:  Negative for congestion.    Respiratory:  Negative for shortness of breath.    Cardiovascular:  Negative for chest pain.   Gastrointestinal:  Negative for abdominal pain.   Genitourinary:  Negative for dysuria.   Musculoskeletal:  Negative for back pain.   Neurological:  Negative for syncope.   Psychiatric/Behavioral:  Negative for agitation.    Objective:     Vital Signs (Most Recent):  Temp: 98.5 °F (36.9 °C) (09/14/22 1537)  Pulse: 93 (09/14/22 1547)  Resp: 17 (09/14/22 1547)  BP: (!) 104/59 (09/14/22 1537)  SpO2: 95 % (09/14/22 1547)   Vital Signs (24h Range):  Temp:  [97.8 °F (36.6 °C)-98.8 °F (37.1 °C)] 98.5 °F (36.9 °C)  Pulse:  [80-99] 93  Resp:  [12-19] 17  SpO2:  [91 %-95 %] 95 %  BP: (100-142)/(57-75) 104/59     Weight: 76.4 kg (168 lb 6.9 oz)  Body mass index is 21.63 kg/m².    Intake/Output Summary (Last 24 hours) at 9/14/2022 1802  Last data filed at 9/14/2022 1756  Gross per 24 hour   Intake 1643.92 ml   Output --   Net 1643.92 ml      Physical Exam  Constitutional:       Appearance: Normal appearance.   HENT:      Head: Normocephalic.      Nose: Nose normal.      Mouth/Throat:      Mouth: Mucous membranes are moist.   Cardiovascular:      Rate and Rhythm: Normal rate.   Pulmonary:      Effort: No respiratory distress.   Abdominal:      Tenderness: There is no abdominal tenderness.   Musculoskeletal:         General: No swelling.      Cervical back: Normal range of motion.   Skin:     General: Skin is warm.      Capillary Refill: Capillary refill takes less than 2 seconds.   Neurological:      Mental Status: Mental status is at baseline.   Psychiatric:         Behavior: Behavior normal.       Significant Labs: All pertinent labs within the past 24 hours have been  reviewed.    Significant Imaging: I have reviewed all pertinent imaging results/findings within the past 24 hours.

## 2022-09-14 NOTE — ASSESSMENT & PLAN NOTE
Probably neurogenic   SLP recommend puree diet  Son had concerns regarding diet; will reconsult SLP about recommendations

## 2022-09-14 NOTE — ASSESSMENT & PLAN NOTE
Palliative care on board and pt was deemed appropriate for hospice   Case management on board and spoke to [facility]   Patient has been waiting for placement

## 2022-09-14 NOTE — PLAN OF CARE
Venkatesh spoke with Wilfred [phone] with [facility]. Per Wilfred, she will be meeting with pt's son Axel later today. Venkatesh informed Wilfred of pt's updated d/c date.     Venkatesh will continue to follow pt for d/c planning.        09/14/22 1111   Post-Acute Status   Post-Acute Authorization Hospice

## 2022-09-14 NOTE — PLAN OF CARE
Venkatesh spoke with Tiff [phone] with the [facility]. Per Tiff, the hospice agencies they use are [facility] and [facility]. Tiff stated they do not work with [facility] anymore.     [facility]katesh [facility]     Venkatesh spoke with pt's son Axel [phone] to discuss d/c planning. Venkatesh informed Axel that the [facility] works with [facility] and [facility]. Axel is agreeable to use either hospice agency. Venkatesh encouraged Axel to call with any questions or concerns.     Venkatesh spoke with Wilfred [phone] with [facility]. Per Wilfred, they will be able to provide pt with hospice services at the [facility].     Venkatesh will continue to follow pt for d/c planning.        09/14/22 1017   Post-Acute Status   Post-Acute Authorization Hospice   Coverage [employer] Managed Medicare   Hospital Resources/Appts/Education Provided Appointments scheduled by Navigator/Coordinator   Discharge Delays None known at this time   Discharge Plan   Discharge Plan A Hospice/home   Discharge Plan B Return to Nursing Home

## 2022-09-14 NOTE — PLAN OF CARE
Pt. Sleeping between nursing rounds. Aroused by voice. Pt. Intermittently disoriented to situation and time. Dysphagia pureed diet continued and tolerated well with assist feeds and nectar thick liquids. IVF infusing per MAR. No c/o pain or N/V. PT. Continues to be very quiet and withdrawn and has a slow response rate to questions. Hospice visited Pt. At bedside and had meeting with Pt. Joao. KAY arm skin tear dressing replaced. Tele monitored. BS monitored. Bed alarm on and call light in reach. Pt. Instructed to call for assistance.     Problem: Adult Inpatient Plan of Care  Goal: Plan of Care Review  Outcome: Ongoing, Progressing     Problem: Adult Inpatient Plan of Care  Goal: Patient-Specific Goal (Individualized)  Outcome: Ongoing, Progressing     Problem: Adult Inpatient Plan of Care  Goal: Absence of Hospital-Acquired Illness or Injury  Outcome: Ongoing, Progressing     Problem: Adult Inpatient Plan of Care  Goal: Optimal Comfort and Wellbeing  Outcome: Ongoing, Progressing     Problem: Adult Inpatient Plan of Care  Goal: Readiness for Transition of Care  Outcome: Ongoing, Progressing

## 2022-09-14 NOTE — CONSULTS
Thank you for your consult to [facility]. We have reviewed the patient chart. This patient does not meet criteria for [facility] medicine service at this time due to Patient has a condition likely to benefit from in-depth physical exam, or palpation / auscultation, or serial abdominal exams, or Patient is noted as being confused per chart review . Will hand back to In-house service.    Arthur Rosales, HELADIO

## 2022-09-14 NOTE — PROGRESS NOTES
[facility] Medicine  Progress Note    Patient Name: Axel Burgos  MRN: [id number]  Patient Class: IP- Inpatient   Admission Date: 9/9/2022  Length of Stay: 2 days  Attending Physician: Fabian Layton MD  Primary Care Provider: Ahsan Guzman MD        Subjective:     Principal Problem:Acute hypoxemic respiratory failure        HPI:  77-year-old male with past medical history of diabetes, hypertension, presents from nursing facility with concern for worsening productive cough and shortness of breath over the past few days.  History is somewhat limited as patient is very soft-spoken is unclear what his baseline mentation is, though he does appear tired and frail.  He does deny any pain at this time.  Notes that cough is new in that typically he does not wear oxygen.  According to patient's son, 2 years of short term memory loss, confusion, not very conversational at baseline and often moves his mouth, but doesn't speak.  The son reported that the nursing home called him today and reported that patient had one episode of vomiting and then was noted to have oxygen saturations in the 80s.  He arrived to ED on oxygen.  On exam, coarse breath sounds noted, concerning for aspiration pneumonia vs community acquired pneumonia. Will treat empirically at this time for community-acquired pneumonia as it is unclear if he may have had increased coughing congestion over the past few days.  Labs remarkable for leukocytosis and mildly elevated troponin.  EKG pending. Chest x-ray concerning for new and or intervally progressed bilateral coarse interstitial markings, most prevalent in the left lower lung zone region. Patient admitted to hospital medicine observation service for further medical management.       Overview/Hospital Course:   77-year-old male with past medical history of diabetes, hypertension, presents from nursing facility with concern for worsening productive cough and shortness of breath over the  past few days.Started empirically on treatment for pneumonia. Concerning for aspiration. SLP eval, recommended patient to remain NPO. D5NS started. He also developed hypotension and his home BP meds were discontinued, with improvement in blood pressure and BP is slowly increasing. Will restart his home BP meds. Passed SLP eval for Puree diet. Palliative consult placed. Hospice appropriate. Patient is from [facility]. CTM to arrange for dispo.       Interval History:     NC 3 LPM  Oriented to self   Pleasantly confused       Review of Systems   Constitutional: Negative.    HENT: Negative.     Eyes: Negative.    Respiratory:  Positive for cough.    Cardiovascular: Negative.    Gastrointestinal: Negative.    Genitourinary: Negative.    Musculoskeletal: Negative.    Skin: Negative.    Neurological:  Positive for weakness.   Hematological: Negative.    Psychiatric/Behavioral: Negative.     Objective:     Vital Signs (Most Recent):  Temp: 98.3 °F (36.8 °C) (09/13/22 1933)  Pulse: 83 (09/13/22 1933)  Resp: 19 (09/13/22 1933)  BP: (!) 142/75 (09/13/22 1933)  SpO2: (!) 93 % (09/13/22 1933)   Vital Signs (24h Range):  Temp:  [97.9 °F (36.6 °C)-98.3 °F (36.8 °C)] 98.3 °F (36.8 °C)  Pulse:  [74-83] 83  Resp:  [18-20] 19  SpO2:  [93 %-97 %] 93 %  BP: (130-158)/(68-78) 142/75     Weight: 73 kg (160 lb 15 oz)  Body mass index is 20.66 kg/m².    Intake/Output Summary (Last 24 hours) at 9/13/2022 2025  Last data filed at 9/13/2022 1758  Gross per 24 hour   Intake 3844.71 ml   Output --   Net 3844.71 ml        Physical Exam  Constitutional:       Appearance: He is ill-appearing.   HENT:      Head: Normocephalic and atraumatic.      Right Ear: External ear normal.      Left Ear: External ear normal.      Nose: Nose normal.      Mouth/Throat:      Mouth: Mucous membranes are moist.   Eyes:      Pupils: Pupils are equal, round, and reactive to light.   Cardiovascular:      Rate and Rhythm: Normal rate.   Pulmonary:      Effort:  Pulmonary effort is normal.      Breath sounds: Rhonchi and rales present.   Abdominal:      General: Abdomen is flat. Bowel sounds are normal.      Palpations: Abdomen is soft.   Musculoskeletal:         General: Normal range of motion.   Skin:     General: Skin is warm.      Capillary Refill: Capillary refill takes less than 2 seconds.   Neurological:      General: No focal deficit present.      Mental Status: He is alert.   Psychiatric:         Mood and Affect: Mood normal.       Significant Labs: All pertinent labs within the past 24 hours have been reviewed.  BMP:   Recent Labs   Lab 09/13/22  0507   *      K 3.5      CO2 27   BUN 12   CREATININE 0.7   CALCIUM 9.7     CBC:   Recent Labs   Lab 09/12/22  0900 09/13/22  0507   WBC 9.10 9.44   HGB 10.6* 11.2*   HCT 33.2* 33.8*    294       Significant Imaging: I have reviewed all pertinent imaging results/findings within the past 24 hours.  I have reviewed and interpreted all pertinent imaging results/findings within the past 24 hours.      Assessment/Plan:      * Acute hypoxemic respiratory failure  Pneumonia of both lungs due to infectious organism  Elevated WBC count    Community acquired pneumonia vs Aspiration pnemonia  Continuous cardiac monitoring  Oxygen  duonebs prn  Continue IV ceftriaxone and azithromycin    Palliative care encounter  Appropriate for hospice  CTM to help with hospice/placement         Other dysphagia  Probably neurogenic   Initially evaluated by SLP, advised NPO  Repeat SLP recommend puree diet      Anticoagulant long-term use  Unable to confirm info from nursing facility on multiple attempts by   Spoke with son he is not sure why he was on AC  C./w Eliquis 5 mg BID    Elevated brain natriuretic peptide (BNP) level        Elevated WBC count        Elevated troponin  Denies chest pain  EKG with no acute ST-T wave changes, RBBB, Sinus arrhythmia   Troponin elevated due to type II demand ischemia   C/w  medical management       Pneumonia of both lungs due to infectious organism  Likely aspiration, CAP  Unknown organisms   Empiric Abx Day 5/7  Breathing treatment   Wean of oxygen   RT eval and treat  C/w chest PT  Intermittent suctioning       Mild protein malnutrition  Nutrition consulted.   Most recent weight and BMI monitored                        Measurements:  Wt Readings from Last 1 Encounters:   09/13/22 73 kg (160 lb 15 oz)   Body mass index is 20.66 kg/m².    Recommendations: Recommendation/Intervention: 1. Add Boost pudding BID and Boost Glucose Control BID. 2. Encourage intake at meals as tolerated. 3. Monitor weight/labs. 4. RD to follow to monitor po intake  Goals: Pt will tolerate diet with at least 50-75% intake at meals by RD follow up    Patient has been screened and assessed by RD. RD will follow patient.      Anemia of chronic disease  H&H stable     Type 2 diabetes mellitus, controlled  Hemoglobin A1c 5.9  On trulicity at home   POC glucose checks ac meals and nightly  Low dose SSI PRN  Hypoglycemia protocol PRN  Continue statin     Essential hypertension  BP slightly elevated   Restart home meds       VTE Risk Mitigation (From admission, onward)         Ordered     enoxaparin injection 80 mg  Every 12 hours (non-standard times)         09/10/22 1049     IP VTE HIGH RISK PATIENT  Once         09/09/22 2033     Place sequential compression device  Until discontinued         09/09/22 2033                Discharge Planning   JESSI:      Code Status: Full Code   Is the patient medically ready for discharge?:     Reason for patient still in hospital (select all that apply): Pending disposition  Discharge Plan A: Return to nursing home                  Fabian Layton MD  Department of Hospital Medicine   [facility]

## 2022-09-14 NOTE — ASSESSMENT & PLAN NOTE
Nutrition consulted.   Most recent weight and BMI monitored                        Measurements:  Wt Readings from Last 1 Encounters:   09/13/22 76.4 kg (168 lb 6.9 oz)   Body mass index is 21.63 kg/m².    Recommendations: Recommendation/Intervention: 1. Add Boost pudding BID and Boost Glucose Control BID. 2. Encourage intake at meals as tolerated. 3. Monitor weight/labs. 4. RD to follow to monitor po intake  Goals: Pt will tolerate diet with at least 50-75% intake at meals by RD follow up    Patient has been screened and assessed by RD. RD will follow patient.

## 2022-09-15 PROBLEM — K59.00 CONSTIPATION: Status: ACTIVE | Noted: 2022-09-15

## 2022-09-15 LAB
ANION GAP SERPL CALC-SCNC: 10 MMOL/L (ref 8–16)
BASOPHILS # BLD AUTO: 0.06 K/UL (ref 0–0.2)
BASOPHILS NFR BLD: 0.7 % (ref 0–1.9)
BUN SERPL-MCNC: 13 MG/DL (ref 8–23)
CALCIUM SERPL-MCNC: 9 MG/DL (ref 8.7–10.5)
CHLORIDE SERPL-SCNC: 106 MMOL/L (ref 95–110)
CO2 SERPL-SCNC: 24 MMOL/L (ref 23–29)
CREAT SERPL-MCNC: 0.7 MG/DL (ref 0.5–1.4)
DIFFERENTIAL METHOD: ABNORMAL
EOSINOPHIL # BLD AUTO: 0.2 K/UL (ref 0–0.5)
EOSINOPHIL NFR BLD: 2.8 % (ref 0–8)
ERYTHROCYTE [DISTWIDTH] IN BLOOD BY AUTOMATED COUNT: 15.8 % (ref 11.5–14.5)
EST. GFR  (NO RACE VARIABLE): >60 ML/MIN/1.73 M^2
GLUCOSE SERPL-MCNC: 119 MG/DL (ref 70–110)
HCT VFR BLD AUTO: 31.7 % (ref 40–54)
HGB BLD-MCNC: 10.5 G/DL (ref 14–18)
IMM GRANULOCYTES # BLD AUTO: 0.16 K/UL (ref 0–0.04)
IMM GRANULOCYTES NFR BLD AUTO: 1.9 % (ref 0–0.5)
LYMPHOCYTES # BLD AUTO: 2.9 K/UL (ref 1–4.8)
LYMPHOCYTES NFR BLD: 33.7 % (ref 18–48)
MCH RBC QN AUTO: 32 PG (ref 27–31)
MCHC RBC AUTO-ENTMCNC: 33.1 G/DL (ref 32–36)
MCV RBC AUTO: 97 FL (ref 82–98)
MONOCYTES # BLD AUTO: 0.6 K/UL (ref 0.3–1)
MONOCYTES NFR BLD: 6.6 % (ref 4–15)
NEUTROPHILS # BLD AUTO: 4.7 K/UL (ref 1.8–7.7)
NEUTROPHILS NFR BLD: 54.3 % (ref 38–73)
NRBC BLD-RTO: 0 /100 WBC
PLATELET # BLD AUTO: 247 K/UL (ref 150–450)
PMV BLD AUTO: 10.1 FL (ref 9.2–12.9)
POCT GLUCOSE: 115 MG/DL (ref 70–110)
POCT GLUCOSE: 117 MG/DL (ref 70–110)
POCT GLUCOSE: 148 MG/DL (ref 70–110)
POCT GLUCOSE: 178 MG/DL (ref 70–110)
POTASSIUM SERPL-SCNC: 3.4 MMOL/L (ref 3.5–5.1)
RBC # BLD AUTO: 3.28 M/UL (ref 4.6–6.2)
SODIUM SERPL-SCNC: 140 MMOL/L (ref 136–145)
WBC # BLD AUTO: 8.61 K/UL (ref 3.9–12.7)

## 2022-09-15 PROCEDURE — 21400001 HC TELEMETRY ROOM

## 2022-09-15 PROCEDURE — 25000003 PHARM REV CODE 250: Performed by: NURSE PRACTITIONER

## 2022-09-15 PROCEDURE — 63600175 PHARM REV CODE 636 W HCPCS: Performed by: INTERNAL MEDICINE

## 2022-09-15 PROCEDURE — 80048 BASIC METABOLIC PNL TOTAL CA: CPT | Performed by: INTERNAL MEDICINE

## 2022-09-15 PROCEDURE — 25000003 PHARM REV CODE 250: Performed by: INTERNAL MEDICINE

## 2022-09-15 PROCEDURE — 36415 COLL VENOUS BLD VENIPUNCTURE: CPT | Performed by: INTERNAL MEDICINE

## 2022-09-15 PROCEDURE — 85025 COMPLETE CBC W/AUTO DIFF WBC: CPT | Performed by: INTERNAL MEDICINE

## 2022-09-15 PROCEDURE — 99900035 HC TECH TIME PER 15 MIN (STAT)

## 2022-09-15 PROCEDURE — 94668 MNPJ CHEST WALL SBSQ: CPT

## 2022-09-15 PROCEDURE — 63600175 PHARM REV CODE 636 W HCPCS: Performed by: NURSE PRACTITIONER

## 2022-09-15 PROCEDURE — 63700000 PHARM REV CODE 250 ALT 637 W/O HCPCS: Performed by: INTERNAL MEDICINE

## 2022-09-15 PROCEDURE — 94761 N-INVAS EAR/PLS OXIMETRY MLT: CPT

## 2022-09-15 RX ORDER — SENNOSIDES 8.6 MG/1
8.6 TABLET ORAL 2 TIMES DAILY
Status: DISCONTINUED | OUTPATIENT
Start: 2022-09-15 | End: 2022-09-16 | Stop reason: HOSPADM

## 2022-09-15 RX ORDER — POLYETHYLENE GLYCOL 3350 17 G/17G
17 POWDER, FOR SOLUTION ORAL DAILY
Status: DISCONTINUED | OUTPATIENT
Start: 2022-09-15 | End: 2022-09-16 | Stop reason: HOSPADM

## 2022-09-15 RX ORDER — POTASSIUM CHLORIDE 7.45 MG/ML
10 INJECTION INTRAVENOUS
Status: COMPLETED | OUTPATIENT
Start: 2022-09-15 | End: 2022-09-15

## 2022-09-15 RX ADMIN — ASPIRIN 81 MG: 81 TABLET, COATED ORAL at 09:09

## 2022-09-15 RX ADMIN — ENOXAPARIN SODIUM 80 MG: 100 INJECTION SUBCUTANEOUS at 12:09

## 2022-09-15 RX ADMIN — CEFTRIAXONE 1 G: 1 INJECTION, SOLUTION INTRAVENOUS at 09:09

## 2022-09-15 RX ADMIN — BENZONATATE 100 MG: 100 CAPSULE ORAL at 09:09

## 2022-09-15 RX ADMIN — POLYETHYLENE GLYCOL 3350 17 G: 17 POWDER, FOR SOLUTION ORAL at 09:09

## 2022-09-15 RX ADMIN — BENZONATATE 100 MG: 100 CAPSULE ORAL at 08:09

## 2022-09-15 RX ADMIN — ENOXAPARIN SODIUM 80 MG: 100 INJECTION SUBCUTANEOUS at 11:09

## 2022-09-15 RX ADMIN — POTASSIUM CHLORIDE 10 MEQ: 7.46 INJECTION, SOLUTION INTRAVENOUS at 02:09

## 2022-09-15 RX ADMIN — AZITHROMYCIN MONOHYDRATE 500 MG: 250 TABLET ORAL at 09:09

## 2022-09-15 RX ADMIN — DEXTROSE AND SODIUM CHLORIDE: 5; .9 INJECTION, SOLUTION INTRAVENOUS at 12:09

## 2022-09-15 RX ADMIN — SENNOSIDES 8.6 MG: 8.6 TABLET, FILM COATED ORAL at 09:09

## 2022-09-15 RX ADMIN — SENNOSIDES 8.6 MG: 8.6 TABLET, FILM COATED ORAL at 08:09

## 2022-09-15 RX ADMIN — POTASSIUM CHLORIDE 10 MEQ: 7.46 INJECTION, SOLUTION INTRAVENOUS at 12:09

## 2022-09-15 RX ADMIN — ATORVASTATIN CALCIUM 20 MG: 20 TABLET, FILM COATED ORAL at 09:09

## 2022-09-15 RX ADMIN — POTASSIUM BICARBONATE 20 MEQ: 391 TABLET, EFFERVESCENT ORAL at 09:09

## 2022-09-15 NOTE — PROGRESS NOTES
[facility] Medicine  Progress Note    Patient Name: Axel Burgos  MRN: [id number]  Patient Class: IP- Inpatient   Admission Date: 9/9/2022  Length of Stay: 4 days  Attending Physician: Isauro Combs MD  Primary Care Provider: Ahsan Guzman MD        Subjective:     Principal Problem:Palliative care encounter        HPI:  77-year-old male with past medical history of diabetes, hypertension, presents from nursing facility with concern for worsening productive cough and shortness of breath over the past few days.  History is somewhat limited as patient is very soft-spoken is unclear what his baseline mentation is, though he does appear tired and frail.  He does deny any pain at this time.  Notes that cough is new in that typically he does not wear oxygen.  According to patient's son, 2 years of short term memory loss, confusion, not very conversational at baseline and often moves his mouth, but doesn't speak.  The son reported that the nursing home called him today and reported that patient had one episode of vomiting and then was noted to have oxygen saturations in the 80s.  He arrived to ED on oxygen.  On exam, coarse breath sounds noted, concerning for aspiration pneumonia vs community acquired pneumonia. Will treat empirically at this time for community-acquired pneumonia as it is unclear if he may have had increased coughing congestion over the past few days.  Labs remarkable for leukocytosis and mildly elevated troponin.  EKG pending. Chest x-ray concerning for new and or intervally progressed bilateral coarse interstitial markings, most prevalent in the left lower lung zone region. Patient admitted to hospital medicine observation service for further medical management.       Overview/Hospital Course:   77-year-old male with past medical history of diabetes, hypertension, presents from nursing facility with concern for worsening productive cough and shortness of breath over the past  few days.Started empirically on treatment for pneumonia. Concerning for aspiration. SLP eval, recommended patient to remain NPO. D5NS started. He also developed hypotension and his home BP meds were discontinued, with improvement in blood pressure and BP is slowly increasing. Will restart his home BP meds. Passed SLP eval for Puree diet. Palliative consult placed. Hospice appropriate. Patient is from the [facility]. CTM to arrange for dispo.       Interval History: Patient seen and examined on bed.  Patient lying comfortably in the bed.  No acute event happened overnight.  Patient not in distress.       Review of Systems   Constitutional:  Negative for fever.   Respiratory:  Negative for shortness of breath.    Cardiovascular:  Negative for chest pain.   Gastrointestinal:  Negative for abdominal pain.   Genitourinary:  Negative for dysuria.   Musculoskeletal:  Negative for back pain.   Neurological:  Negative for syncope.   Psychiatric/Behavioral:  Negative for agitation.    Objective:     Vital Signs (Most Recent):  Temp: 97.7 °F (36.5 °C) (09/15/22 1540)  Pulse: 76 (09/15/22 1540)  Resp: 18 (09/15/22 1540)  BP: 131/60 (09/15/22 1540)  SpO2: 97 % (09/15/22 1604) Vital Signs (24h Range):  Temp:  [97.3 °F (36.3 °C)-98.3 °F (36.8 °C)] 97.7 °F (36.5 °C)  Pulse:  [76-87] 76  Resp:  [18-19] 18  SpO2:  [92 %-98 %] 97 %  BP: (113-131)/(59-72) 131/60     Weight: 78.6 kg (173 lb 4.5 oz)  Body mass index is 22.25 kg/m².    Intake/Output Summary (Last 24 hours) at 9/15/2022 1638  Last data filed at 9/15/2022 1200  Gross per 24 hour   Intake 1573.92 ml   Output 0 ml   Net 1573.92 ml      Physical Exam  Constitutional:       Appearance: He is ill-appearing.   HENT:      Head: Normocephalic.      Nose: Nose normal.      Mouth/Throat:      Mouth: Mucous membranes are moist.   Cardiovascular:      Rate and Rhythm: Normal rate.   Pulmonary:      Effort: No respiratory distress.   Abdominal:      Tenderness: There is no abdominal  tenderness.   Musculoskeletal:         General: No swelling.   Skin:     General: Skin is warm.      Capillary Refill: Capillary refill takes less than 2 seconds.   Neurological:      Mental Status: Mental status is at baseline.   Psychiatric:         Behavior: Behavior normal.       Significant Labs: All pertinent labs within the past 24 hours have been reviewed.    Significant Imaging: I have reviewed all pertinent imaging results/findings within the past 24 hours.      Assessment/Plan:      * Palliative care encounter  Palliative care on board and pt was deemed appropriate for hospice   Case management on board and spoke to son who are not in agreement with hospice at this time   CM for placement        Pneumonia of both lungs due to infectious organism  Discontinue ceftriaxone and azithromycin after tomorrow dose to complete the course   Cultures negative       Constipation  miralax  Senna     Other dysphagia  Probably neurogenic   SLP recommend puree diet  Son had concerns regarding diet; SLP reconsulted SLP and evaluated     Anticoagulant long-term use  Unable to confirm info from nursing facility on multiple attempts by   Spoke with son he is not sure why he was on AC  Eliquis 5 mg BID    Mild protein malnutrition  Nutrition consulted.   Most recent weight and BMI monitored                        Measurements:  Wt Readings from Last 1 Encounters:   09/13/22 76.4 kg (168 lb 6.9 oz)   Body mass index is 21.63 kg/m².    Recommendations: Recommendation/Intervention: 1. Add Boost pudding BID and Boost Glucose Control BID. 2. Encourage intake at meals as tolerated. 3. Monitor weight/labs. 4. RD to follow to monitor po intake  Goals: Pt will tolerate diet with at least 50-75% intake at meals by RD follow up    Patient has been screened and assessed by RD. RD will follow patient.      Anemia of chronic disease  H&H stable     Type 2 diabetes mellitus, controlled  Hemoglobin A1c 5.9  On trulicity at home    POC glucose checks ac meals and nightly  Low dose SSI PRN  Hypoglycemia protocol PRN  Continue statin    Essential hypertension  Continue amlodipine     VTE Risk Mitigation (From admission, onward)         Ordered     enoxaparin injection 80 mg  Every 12 hours (non-standard times)         09/10/22 1049     IP VTE HIGH RISK PATIENT  Once         09/09/22 2033     Place sequential compression device  Until discontinued         09/09/22 2033                Discharge Planning   JESSI:      Code Status: Full Code   Is the patient medically ready for discharge?:     Reason for patient still in hospital (select all that apply): Pending disposition  Discharge Plan A: Return to nursing home   Discharge Delays: None known at this time              Isauro Combs MD  Department of Hospital Medicine   [facility]

## 2022-09-15 NOTE — ASSESSMENT & PLAN NOTE
Patient calling wanting to know if her FMLA will be extended 3 more months? They will be faxing paperwork if this is going to be extended. [email or web address]  Senna

## 2022-09-15 NOTE — ASSESSMENT & PLAN NOTE
Discontinue ceftriaxone and azithromycin after tomorrow dose to complete the course   Cultures negative

## 2022-09-15 NOTE — PLAN OF CARE
Problem: Adult Inpatient Plan of Care  Goal: Plan of Care Review  Outcome: Ongoing, Progressing  Goal: Patient-Specific Goal (Individualized)  Outcome: Ongoing, Progressing  Goal: Absence of Hospital-Acquired Illness or Injury  Outcome: Ongoing, Progressing  Goal: Optimal Comfort and Wellbeing  Outcome: Ongoing, Progressing  Goal: Readiness for Transition of Care  Outcome: Ongoing, Progressing     Problem: Diabetes Comorbidity  Goal: Blood Glucose Level Within Targeted Range  Outcome: Ongoing, Progressing     Problem: Hypertension Comorbidity  Goal: Blood Pressure in Desired Range  Outcome: Ongoing, Progressing     Problem: Fluid Imbalance (Pneumonia)  Goal: Fluid Balance  Outcome: Ongoing, Progressing     Problem: Infection (Pneumonia)  Goal: Resolution of Infection Signs and Symptoms  Outcome: Ongoing, Progressing     Problem: Respiratory Compromise (Pneumonia)  Goal: Effective Oxygenation and Ventilation  Outcome: Ongoing, Progressing     Problem: Acute Confusion (Hospitalized Older Adult)  Goal: Optimal Cognitive Function  Outcome: Ongoing, Progressing     Problem: Bowel Elimination Impaired (Hospitalized Older Adult)  Goal: Effective Bowel Elimination  Outcome: Ongoing, Progressing     Problem: Depression (Hospitalized Older Adult)  Goal: Depressive Symptoms Identified and Managed  Outcome: Ongoing, Progressing     Problem: Fluid Imbalance (Hospitalized Older Adult)  Goal: Fluid Balance  Outcome: Ongoing, Progressing     Problem: Functional Ability Impaired (Hospitalized Older Adult)  Goal: Optimal Functional Ability  Outcome: Ongoing, Progressing     Problem: Oral Intake Inadequate (Hospitalized Older Adult)  Goal: Optimal Nutrition Intake  Outcome: Ongoing, Progressing     Problem: Sleep Disturbance (Hospitalized Older Adult)  Goal: Adequate Sleep/Rest  Outcome: Ongoing, Progressing     Problem: Urinary Incontinence (Hospitalized Older Adult)  Goal: Urinary Incontinence Managed  Outcome: Ongoing,  Progressing     Problem: Skin Injury Risk Increased  Goal: Skin Health and Integrity  Outcome: Ongoing, Progressing     Problem: Fall Injury Risk  Goal: Absence of Fall and Fall-Related Injury  Outcome: Ongoing, Progressing     Problem: Impaired Wound Healing  Goal: Optimal Wound Healing  Outcome: Ongoing, Progressing     Problem: Coping Ineffective  Goal: Effective Coping  Outcome: Ongoing, Progressing

## 2022-09-15 NOTE — SUBJECTIVE & OBJECTIVE
Interval History: Patient seen and examined on bed.  Patient lying comfortably in the bed.  No acute event happened overnight.  Patient not in distress.       Review of Systems   Constitutional:  Negative for fever.   Respiratory:  Negative for shortness of breath.    Cardiovascular:  Negative for chest pain.   Gastrointestinal:  Negative for abdominal pain.   Genitourinary:  Negative for dysuria.   Musculoskeletal:  Negative for back pain.   Neurological:  Negative for syncope.   Psychiatric/Behavioral:  Negative for agitation.    Objective:     Vital Signs (Most Recent):  Temp: 97.7 °F (36.5 °C) (09/15/22 1540)  Pulse: 76 (09/15/22 1540)  Resp: 18 (09/15/22 1540)  BP: 131/60 (09/15/22 1540)  SpO2: 97 % (09/15/22 1604) Vital Signs (24h Range):  Temp:  [97.3 °F (36.3 °C)-98.3 °F (36.8 °C)] 97.7 °F (36.5 °C)  Pulse:  [76-87] 76  Resp:  [18-19] 18  SpO2:  [92 %-98 %] 97 %  BP: (113-131)/(59-72) 131/60     Weight: 78.6 kg (173 lb 4.5 oz)  Body mass index is 22.25 kg/m².    Intake/Output Summary (Last 24 hours) at 9/15/2022 1638  Last data filed at 9/15/2022 1200  Gross per 24 hour   Intake 1573.92 ml   Output 0 ml   Net 1573.92 ml      Physical Exam  Constitutional:       Appearance: He is ill-appearing.   HENT:      Head: Normocephalic.      Nose: Nose normal.      Mouth/Throat:      Mouth: Mucous membranes are moist.   Cardiovascular:      Rate and Rhythm: Normal rate.   Pulmonary:      Effort: No respiratory distress.   Abdominal:      Tenderness: There is no abdominal tenderness.   Musculoskeletal:         General: No swelling.   Skin:     General: Skin is warm.      Capillary Refill: Capillary refill takes less than 2 seconds.   Neurological:      Mental Status: Mental status is at baseline.   Psychiatric:         Behavior: Behavior normal.       Significant Labs: All pertinent labs within the past 24 hours have been reviewed.    Significant Imaging: I have reviewed all pertinent imaging results/findings within  the past 24 hours.

## 2022-09-15 NOTE — NURSING
Assumed care of patient from TIANA Kohler, found patient resting in bed, AAOX3, room air, vitals WNL, no c/o pain or discomfort, bandage on left arm from previous skin tear, all safety precautions are in place, call bell and tray table are within reach of the patient, will continue to monitor.

## 2022-09-15 NOTE — ASSESSMENT & PLAN NOTE
Palliative care on board and pt was deemed appropriate for hospice   Case management on board and spoke to son who are not in agreement with hospice at this time   CM for placement

## 2022-09-15 NOTE — NURSING
Bedside nurse notified Dr. Combs of Pt. Last BM being 9/8. No c/o abdominal pain or constipation from Pt. But no noted BM for several days. MD placed med orders for constipation. WCTM.

## 2022-09-15 NOTE — ASSESSMENT & PLAN NOTE
Unable to confirm info from nursing facility on multiple attempts by   Spoke with son he is not sure why he was on AC  Eliquis 5 mg BID

## 2022-09-15 NOTE — ASSESSMENT & PLAN NOTE
Probably neurogenic   SLP recommend puree diet  Son had concerns regarding diet; SLP reconsulted SLP and evaluated

## 2022-09-15 NOTE — PLAN OF CARE
Pt's son Axel does not want pt to return to the [facility] with hospice at this time. Axel is agreeable for pt to return to the [facility].     Venkatesh attempted to call Tiff [phone] with the [facility]; no answer noted. Venkatesh left voicemail. Venkatesh will follow up later.     Venkatesh will continue to follow pt for d/c planning.        09/15/22 1331   Discharge Reassessment   Assessment Type Discharge Planning Reassessment   Did the patient's condition or plan change since previous assessment? No   Discharge Plan A Return to nursing home   DME Needed Upon Discharge  none   Discharge Barriers Identified None   Post-Acute Status   Post-Acute Authorization Hospice   Hospice Status Patient declined/refused   Coverage [employer] Managed [employer]   Hospital Resources/Appts/Education Provided Appointments scheduled by Navigator/Coordinator   Discharge Delays None known at this time

## 2022-09-16 VITALS
BODY MASS INDEX: 23.4 KG/M2 | HEART RATE: 74 BPM | OXYGEN SATURATION: 99 % | WEIGHT: 182.31 LBS | HEIGHT: 74 IN | SYSTOLIC BLOOD PRESSURE: 136 MMHG | RESPIRATION RATE: 20 BRPM | DIASTOLIC BLOOD PRESSURE: 61 MMHG | TEMPERATURE: 97 F

## 2022-09-16 LAB
ANION GAP SERPL CALC-SCNC: 9 MMOL/L (ref 8–16)
BASOPHILS # BLD AUTO: 0.07 K/UL (ref 0–0.2)
BASOPHILS NFR BLD: 0.7 % (ref 0–1.9)
BUN SERPL-MCNC: 11 MG/DL (ref 8–23)
CALCIUM SERPL-MCNC: 9.4 MG/DL (ref 8.7–10.5)
CHLORIDE SERPL-SCNC: 106 MMOL/L (ref 95–110)
CO2 SERPL-SCNC: 24 MMOL/L (ref 23–29)
CREAT SERPL-MCNC: 0.7 MG/DL (ref 0.5–1.4)
DIFFERENTIAL METHOD: ABNORMAL
EOSINOPHIL # BLD AUTO: 0.3 K/UL (ref 0–0.5)
EOSINOPHIL NFR BLD: 2.8 % (ref 0–8)
ERYTHROCYTE [DISTWIDTH] IN BLOOD BY AUTOMATED COUNT: 15.7 % (ref 11.5–14.5)
EST. GFR  (NO RACE VARIABLE): >60 ML/MIN/1.73 M^2
GLUCOSE SERPL-MCNC: 115 MG/DL (ref 70–110)
HCT VFR BLD AUTO: 33.2 % (ref 40–54)
HGB BLD-MCNC: 10.9 G/DL (ref 14–18)
IMM GRANULOCYTES # BLD AUTO: 0.11 K/UL (ref 0–0.04)
IMM GRANULOCYTES NFR BLD AUTO: 1.1 % (ref 0–0.5)
LYMPHOCYTES # BLD AUTO: 2.7 K/UL (ref 1–4.8)
LYMPHOCYTES NFR BLD: 26.9 % (ref 18–48)
MCH RBC QN AUTO: 31.9 PG (ref 27–31)
MCHC RBC AUTO-ENTMCNC: 32.8 G/DL (ref 32–36)
MCV RBC AUTO: 97 FL (ref 82–98)
MONOCYTES # BLD AUTO: 0.7 K/UL (ref 0.3–1)
MONOCYTES NFR BLD: 6.5 % (ref 4–15)
NEUTROPHILS # BLD AUTO: 6.3 K/UL (ref 1.8–7.7)
NEUTROPHILS NFR BLD: 62 % (ref 38–73)
NRBC BLD-RTO: 0 /100 WBC
PLATELET # BLD AUTO: 253 K/UL (ref 150–450)
PMV BLD AUTO: 10.2 FL (ref 9.2–12.9)
POCT GLUCOSE: 132 MG/DL (ref 70–110)
POCT GLUCOSE: 133 MG/DL (ref 70–110)
POTASSIUM SERPL-SCNC: 3.8 MMOL/L (ref 3.5–5.1)
RBC # BLD AUTO: 3.42 M/UL (ref 4.6–6.2)
SODIUM SERPL-SCNC: 139 MMOL/L (ref 136–145)
WBC # BLD AUTO: 10.2 K/UL (ref 3.9–12.7)

## 2022-09-16 PROCEDURE — 25000003 PHARM REV CODE 250: Performed by: INTERNAL MEDICINE

## 2022-09-16 PROCEDURE — 63600175 PHARM REV CODE 636 W HCPCS: Performed by: INTERNAL MEDICINE

## 2022-09-16 PROCEDURE — 99900035 HC TECH TIME PER 15 MIN (STAT)

## 2022-09-16 PROCEDURE — 36415 COLL VENOUS BLD VENIPUNCTURE: CPT | Performed by: INTERNAL MEDICINE

## 2022-09-16 PROCEDURE — 85025 COMPLETE CBC W/AUTO DIFF WBC: CPT | Performed by: INTERNAL MEDICINE

## 2022-09-16 PROCEDURE — 94761 N-INVAS EAR/PLS OXIMETRY MLT: CPT

## 2022-09-16 PROCEDURE — 63700000 PHARM REV CODE 250 ALT 637 W/O HCPCS: Performed by: INTERNAL MEDICINE

## 2022-09-16 PROCEDURE — 25000003 PHARM REV CODE 250: Performed by: NURSE PRACTITIONER

## 2022-09-16 PROCEDURE — 94668 MNPJ CHEST WALL SBSQ: CPT

## 2022-09-16 PROCEDURE — 80048 BASIC METABOLIC PNL TOTAL CA: CPT | Performed by: INTERNAL MEDICINE

## 2022-09-16 RX ORDER — BISACODYL 10 MG
10 SUPPOSITORY, RECTAL RECTAL ONCE
Status: COMPLETED | OUTPATIENT
Start: 2022-09-16 | End: 2022-09-16

## 2022-09-16 RX ADMIN — BISACODYL 10 MG: 10 SUPPOSITORY RECTAL at 01:09

## 2022-09-16 RX ADMIN — POLYETHYLENE GLYCOL 3350 17 G: 17 POWDER, FOR SOLUTION ORAL at 08:09

## 2022-09-16 RX ADMIN — SENNOSIDES 8.6 MG: 8.6 TABLET, FILM COATED ORAL at 08:09

## 2022-09-16 RX ADMIN — ATORVASTATIN CALCIUM 20 MG: 20 TABLET, FILM COATED ORAL at 08:09

## 2022-09-16 RX ADMIN — AZITHROMYCIN MONOHYDRATE 500 MG: 250 TABLET ORAL at 08:09

## 2022-09-16 RX ADMIN — DEXTROSE AND SODIUM CHLORIDE: 5; .9 INJECTION, SOLUTION INTRAVENOUS at 08:09

## 2022-09-16 RX ADMIN — ASPIRIN 81 MG: 81 TABLET, COATED ORAL at 08:09

## 2022-09-16 RX ADMIN — CEFTRIAXONE 1 G: 1 INJECTION, SOLUTION INTRAVENOUS at 08:09

## 2022-09-16 RX ADMIN — ENOXAPARIN SODIUM 80 MG: 100 INJECTION SUBCUTANEOUS at 11:09

## 2022-09-16 RX ADMIN — AMLODIPINE BESYLATE 2.5 MG: 2.5 TABLET ORAL at 08:09

## 2022-09-16 NOTE — PLAN OF CARE
D/c orders noted.     Venkatesh spoke with Tiff [phone] with the [facility]. Per Tiff, set up is completed for pt to return to the [facility] today. Rn can call report to: [phone] or [phone].     Venkatesh attempted to call pt's son Axel [phone]; no answer noted. Sw left voicemail. Sw will follow up later.     Pt's son Axel denied hospice services at this time.     Venkatesh set up ambulance due to pt being bedbound for 2:30p.m.     Pt is cleared to go from CM standpoint.        09/16/22 1408   Final Note   Assessment Type Final Discharge Note   Anticipated Discharge Disposition [facility]  ([facility])   What phone number can be called within the next 1-3 days to see how you are doing after discharge? [phone]   Post-Acute Status   Post-Acute Authorization Placement   Post-Acute Placement Status Set-up Complete/Auth obtained   Coverage [employer] Managed Medicare   Discharge Delays None known at this time

## 2022-09-16 NOTE — DISCHARGE SUMMARY
[facility] Medicine  Discharge Summary      Patient Name: Axel Burgos  MRN: [id number]  Patient Class: IP- Inpatient  Admission Date: 9/9/2022  Hospital Length of Stay: 5 days  Discharge Date and Time:  09/16/2022 4:35 PM  Attending Physician: No att. providers found   Discharging Provider: Isauro Combs MD  Primary Care Provider: Ahsan Guzman MD      HPI:   77-year-old male with past medical history of diabetes, hypertension, presents from nursing facility with concern for worsening productive cough and shortness of breath over the past few days.  History is somewhat limited as patient is very soft-spoken is unclear what his baseline mentation is, though he does appear tired and frail.  He does deny any pain at this time.  Notes that cough is new in that typically he does not wear oxygen.  According to patient's son, 2 years of short term memory loss, confusion, not very conversational at baseline and often moves his mouth, but doesn't speak.  The son reported that the nursing home called him today and reported that patient had one episode of vomiting and then was noted to have oxygen saturations in the 80s.  He arrived to ED on oxygen.  On exam, coarse breath sounds noted, concerning for aspiration pneumonia vs community acquired pneumonia. Will treat empirically at this time for community-acquired pneumonia as it is unclear if he may have had increased coughing congestion over the past few days.  Labs remarkable for leukocytosis and mildly elevated troponin.  EKG pending. Chest x-ray concerning for new and or intervally progressed bilateral coarse interstitial markings, most prevalent in the left lower lung zone region. Patient admitted to hospital medicine observation service for further medical management.       * No surgery found *      Hospital Course:    77-year-old male with past medical history of diabetes, hypertension, presents from nursing facility with concern for  worsening productive cough and shortness of breath over the past few days.Started empirically on treatment for pneumonia. Concerning for aspiration. SLP eval, recommended patient to remain NPO. D5NS started. He also developed hypotension and his home BP meds were discontinued, with improvement in blood pressure and BP is slowly increasing. Will restart his home BP meds. Passed SLP eval for Puree diet. Palliative consult placed. Hospice appropriate. Patient is from [facility]. Patient and his family didn't decide about hospice. Completed ceftriaxone and azithromycin course for CAP. Discharged to [facility] in stable condition. Pt to follow up with pcp within 1 week.        Physical Exam  Constitutional:       Appearance: He is ill-appearing.   HENT:      Head: Normocephalic.      Nose: Nose normal.      Mouth/Throat:      Mouth: Mucous membranes are moist.   Cardiovascular:      Rate and Rhythm: Normal rate.   Pulmonary:      Effort: No respiratory distress.   Abdominal:      Tenderness: There is no abdominal tenderness.   Musculoskeletal:         General: No swelling.   Skin:     General: Skin is warm.      Capillary Refill: Capillary refill takes less than 2 seconds.   Neurological:      Mental Status: Mental status is at baseline.   Psychiatric:         Behavior: Behavior normal.     Goals of Care Treatment Preferences:  Code Status: Full Code    Health care agent: Axel Flood  [facility] care agent number: [phone]          What is most important right now is to focus on avoiding the hospital, symptom/pain control, quality of life, even if it means sacrificing a little time, comfort and QOL .  Accordingly, we have decided that the best plan to meet the patient's goals includes enrolling in hospice care.      Consults:   Consults (From admission, onward)        Status Ordering Provider     Inpatient virtual consult to Hospital Medicine  Once        Provider:  (Not yet assigned)    Completed IRMA SAMUELS      Inpatient consult to Registered Dietitian/Nutritionist  Once        Provider:  (Not yet assigned)    Completed IRMA SAMUELS     Inpatient consult to Palliative Care  Once        Provider:  Pedrito Faulkner Jr., MD    Completed IRMA SAMUELS     IP consult to case management  Once        Provider:  (Not yet assigned)    Acknowledged IRMA SAMUELS          No new Assessment & Plan notes have been filed under this hospital service since the last note was generated.  Service: Hospital Medicine    Final Active Diagnoses:    Diagnosis Date Noted POA    PRINCIPAL PROBLEM:  Palliative care encounter [Z51.5] 09/13/2022 Not Applicable    Pneumonia of both lungs due to infectious organism [J18.9] 09/09/2022 Yes    Constipation [K59.00] 09/15/2022 Unknown    Other dysphagia [R13.19] 09/12/2022 Yes    Anticoagulant long-term use [Z79.01] 09/09/2022 Not Applicable    Essential hypertension [I10] 07/18/2017 Yes     Chronic    Type 2 diabetes mellitus, controlled [E11.9] 07/18/2017 Yes     Chronic    Anemia of chronic disease [D63.8] 07/18/2017 Yes     Chronic    Mild protein malnutrition [E44.1] 07/18/2017 Yes     Chronic      Problems Resolved During this Admission:    Diagnosis Date Noted Date Resolved POA    Elevated troponin [R77.8] 09/09/2022 09/14/2022 Yes    Acute hypoxemic respiratory failure [J96.01] 09/09/2022 09/14/2022 Yes    Elevated WBC count [D72.829] 09/09/2022 09/14/2022 Yes    Elevated brain natriuretic peptide (BNP) level [R79.89] 09/09/2022 09/14/2022 Yes       Discharged Condition: fair    Disposition: Skilled Nursing Facility    Follow Up:    Patient Instructions:   No discharge procedures on file.    Significant Diagnostic Studies: Labs:   BMP:   Recent Labs   Lab 09/15/22  0444 09/16/22  0814   * 115*    139   K 3.4* 3.8    106   CO2 24 24   BUN 13 11   CREATININE 0.7 0.7   CALCIUM 9.0 9.4    and CBC   Recent Labs   Lab 09/15/22  0444 09/16/22  0814   WBC 8.61 10.20   HGB 10.5*  10.9*   HCT 31.7* 33.2*    253       Pending Diagnostic Studies:     None         Medications:  Reconciled Home Medications:      Medication List      CONTINUE taking these medications    amLODIPine 2.5 MG tablet  Commonly known as: NORVASC  Take 2.5 mg by mouth once daily.     aspirin 81 MG EC tablet  Commonly known as: ECOTRIN  Take 81 mg by mouth once daily.     atorvastatin 20 MG tablet  Commonly known as: LIPITOR  Take 20 mg by mouth once daily.     CALMOSEPTINE 0.44-20.6 % Oint  Generic drug: menthol-zinc oxide  Apply topically as needed (redness/irritation). Apply to buttocks/groin     ELIQUIS 5 mg Tab  Generic drug: apixaban  Take 5 mg by mouth 2 (two) times daily.     lisinopriL 5 MG tablet  Commonly known as: PRINIVIL,ZESTRIL  Take 5 mg by mouth once daily.     povidone-iodine 10 % external solution  Commonly known as: BETADINE  Apply topically 3 (three) times a week. Paint scabs/wounds to left foot and allow to air dry     TRULICITY 0.75 mg/0.5 mL pen injector  Generic drug: dulaglutide  Inject 0.75 mg into the skin every 7 days.     VITAMIN C 500 MG tablet  Generic drug: ascorbic acid (vitamin C)  Take 500 mg by mouth once daily.     vitamin D 1000 units Tab  Commonly known as: VITAMIN D3  Take 1,000 Units by mouth once daily.        STOP taking these medications    HYDROcodone-acetaminophen 5-325 mg per tablet  Commonly known as: NORCO     LORazepam 1 MG tablet  Commonly known as: ATIVAN     spironolactone 25 MG tablet  Commonly known as: ALDACTONE            Indwelling Lines/Drains at time of discharge:   Lines/Drains/Airways     None                 Time spent on the discharge of patient: 30 minutes         Isauro Combs MD  Department of Hospital Medicine  [facility] Surg

## 2022-09-16 NOTE — NURSING
Report called to [facility], spoke with supervisor Yessy. Updated to POC, medication changes, skin tear to L arm. All questions answered. [facility] at bedside for transport out. Pt changed, incontinent urine episode. No personal belongings noted in room.

## 2022-09-16 NOTE — PLAN OF CARE
Venkatesh faxed facility transfer orders to [facility] at [phone].     Venkatesh called [facility] with the [facility] at [phone] stated that facility transfers orders have been faxed to her.     Venkatesh will continue to follow pt for d/c planning.        09/16/22 1200   Post-Acute Status   Post-Acute Authorization Placement   Discharge Plan   Discharge Plan A Return to nursing home

## 2022-09-16 NOTE — PLAN OF CARE
[facility]    FACILITY TRANSFER ORDERS      Patient Name: Axel Burgos  YOB: 1945    PCP: Ahsan Guzman MD   PCP Address: [address] [place] [address] / [place] [address]  PCP Phone Number: [phone]  PCP Fax: [phone]    Encounter Date: 09/16/2022    Admit to: [facility]     Vital Signs:  Routine    Diagnoses:   Active Hospital Problems    Diagnosis  POA    *Palliative care encounter [Z51.5]  Not Applicable     Priority: 1 - High    Pneumonia of both lungs due to infectious organism [J18.9]  Yes     Priority: 2     Constipation [K59.00]  Unknown     Priority: 3     Other dysphagia [R13.19]  Yes    Anticoagulant long-term use [Z79.01]  Not Applicable    Essential hypertension [I10]  Yes     Chronic    Type 2 diabetes mellitus, controlled [E11.9]  Yes     Chronic    Anemia of chronic disease [D63.8]  Yes     Chronic    Mild protein malnutrition [E44.1]  Yes     Chronic      Resolved Hospital Problems    Diagnosis Date Resolved POA    Elevated troponin [R77.8] 09/14/2022 Yes    Acute hypoxemic respiratory failure [J96.01] 09/14/2022 Yes    Elevated WBC count [D72.829] 09/14/2022 Yes    Elevated brain natriuretic peptide (BNP) level [R79.89] 09/14/2022 Yes       Allergies:Review of patient's allergies indicates:  No Known Allergies    Diet: Dysphagia mechanical soft     Activities: Up with assistance    Goals of Care Treatment Preferences:  Code Status: Full Code    [facility] [phone]          What is most important right now is to focus on avoiding the hospital, symptom/pain control, quality of life, even if it means sacrificing a little time, comfort and QOL .  Accordingly, we have decided that the best plan to meet the patient's goals includes enrolling in hospice care.        Labs: CBC and BMP Once     CONSULTS:    Physical Therapy to evaluate and treat. , Occupational Therapy to evaluate and treat., and Speech Therapy to  evaluate and treat for Swallowing.    MISCELLANEOUS CARE:  Routine Skin for Bedridden Patients: Apply moisture barrier cream to all skin folds and wet areas in perineal area daily and after baths and all bowel movements.    WOUND CARE ORDERS  None    Medications: Review discharge medications with patient and family and provide education.      Current Discharge Medication List        CONTINUE these medications which have NOT CHANGED    Details   amLODIPine (NORVASC) 2.5 MG tablet Take 2.5 mg by mouth once daily.      apixaban (ELIQUIS) 5 mg Tab Take 5 mg by mouth 2 (two) times daily.      ascorbic acid, vitamin C, (VITAMIN C) 500 MG tablet Take 500 mg by mouth once daily.      aspirin (ECOTRIN) 81 MG EC tablet Take 81 mg by mouth once daily.      atorvastatin (LIPITOR) 20 MG tablet Take 20 mg by mouth once daily.      dulaglutide (TRULICITY) 0.75 mg/0.5 mL pen injector Inject 0.75 mg into the skin every 7 days.      lisinopriL (PRINIVIL,ZESTRIL) 5 MG tablet Take 5 mg by mouth once daily.      menthol-zinc oxide (CALMOSEPTINE) 0.44-20.6 % Oint Apply topically as needed (redness/irritation). Apply to buttocks/groin      povidone-iodine (BETADINE) 10 % external solution Apply topically 3 (three) times a week. Paint scabs/wounds to left foot and allow to air dry      vitamin D (VITAMIN D3) 1000 units Tab Take 1,000 Units by mouth once daily.           STOP taking these medications       HYDROcodone-acetaminophen (NORCO) 5-325 mg per tablet Comments:   Reason for Stopping:         LORazepam (ATIVAN) 1 MG tablet Comments:   Reason for Stopping:         spironolactone (ALDACTONE) 25 MG tablet Comments:   Reason for Stopping:                  Immunizations Administered as of 9/16/2022       No immunizations on file.                Some patients may experience side effects after vaccination.  These may include fever, headache, muscle or joint aches.  Most symptoms resolve with 24-48 hours and do not require urgent medical  evaluation unless they persist for more than 72 hours or symptoms are concerning for an unrelated medical condition.          _________________________________  Isauro Combs MD  09/16/2022

## 2022-09-16 NOTE — PLAN OF CARE
Venkatesh spoke with Tiff [phone] with the [facility]. Venkatesh informed Tiff of pt's updated d/c date.     Venkatesh will continue to follow pt for d/c planning.        09/16/22 0948   Post-Acute Status   Post-Acute Authorization Placement   Discharge Plan   Discharge Plan A Return to nursing home

## 2022-09-16 NOTE — NURSING
Assumed care of patient from TIANA Kohler, patient is AAXO3, room air, vitals WNL, diaper, D5 1/2NS running at 50ml/hr, no c/o pain or discomfort, suppository given and patient did have a small BM this morning, skin tear cleaned and wrapped on left lower arm, all safety precautions are in place, call bell and tray table are within the reach of the patient and will continue to monitor.

## 2022-09-20 ENCOUNTER — PATIENT OUTREACH (OUTPATIENT)
Dept: ADMINISTRATIVE | Facility: CLINIC | Age: 77
End: 2022-09-20
Payer: MEDICARE

## 2022-09-21 ENCOUNTER — OUTSIDE PLACE OF SERVICE (OUTPATIENT)
Dept: FAMILY MEDICINE | Facility: CLINIC | Age: 77
End: 2022-09-21
Payer: MEDICARE

## 2022-09-21 PROCEDURE — 99308 PR NURSING FAC CARE, SUBSEQ, MINOR COMPLIC: ICD-10-PCS | Mod: ,,, | Performed by: FAMILY MEDICINE

## 2022-09-21 PROCEDURE — 99308 SBSQ NF CARE LOW MDM 20: CPT | Mod: ,,, | Performed by: FAMILY MEDICINE

## 2022-09-29 DIAGNOSIS — F03.90 DEMENTIA WITHOUT BEHAVIORAL DISTURBANCE, UNSPECIFIED DEMENTIA TYPE: Primary | ICD-10-CM

## 2022-09-29 DIAGNOSIS — E44.1 MILD PROTEIN MALNUTRITION: Chronic | ICD-10-CM

## 2022-10-06 ENCOUNTER — OUTSIDE PLACE OF SERVICE (OUTPATIENT)
Dept: FAMILY MEDICINE | Facility: CLINIC | Age: 77
End: 2022-10-06
Payer: MEDICARE

## 2022-10-06 PROCEDURE — 99499 UNLISTED E&M SERVICE: CPT | Mod: ,,, | Performed by: FAMILY MEDICINE

## 2022-10-06 PROCEDURE — 99499 NO LOS: ICD-10-PCS | Mod: ,,, | Performed by: FAMILY MEDICINE

## 2022-10-12 ENCOUNTER — OUTSIDE PLACE OF SERVICE (OUTPATIENT)
Dept: FAMILY MEDICINE | Facility: CLINIC | Age: 77
End: 2022-10-12
Payer: MEDICARE

## 2022-10-12 PROCEDURE — 99307 SBSQ NF CARE SF MDM 10: CPT | Mod: ,,, | Performed by: FAMILY MEDICINE

## 2022-10-12 PROCEDURE — 99307 PR NURSING FAC CARE, SUBSEQ, IMPROVING: ICD-10-PCS | Mod: ,,, | Performed by: FAMILY MEDICINE

## 2022-10-26 ENCOUNTER — OUTSIDE PLACE OF SERVICE (OUTPATIENT)
Dept: FAMILY MEDICINE | Facility: CLINIC | Age: 77
End: 2022-10-26
Payer: MEDICARE

## 2022-10-26 PROCEDURE — 99499 NO LOS: ICD-10-PCS | Mod: ,,, | Performed by: FAMILY MEDICINE

## 2022-10-26 PROCEDURE — 99499 UNLISTED E&M SERVICE: CPT | Mod: ,,, | Performed by: FAMILY MEDICINE

## 2022-11-02 ENCOUNTER — OUTSIDE PLACE OF SERVICE (OUTPATIENT)
Dept: FAMILY MEDICINE | Facility: CLINIC | Age: 77
End: 2022-11-02
Payer: MEDICARE

## 2022-11-02 PROCEDURE — 99307 SBSQ NF CARE SF MDM 10: CPT | Mod: ,,, | Performed by: FAMILY MEDICINE

## 2022-11-02 PROCEDURE — 99307 PR NURSING FAC CARE, SUBSEQ, IMPROVING: ICD-10-PCS | Mod: ,,, | Performed by: FAMILY MEDICINE

## 2022-12-19 PROBLEM — J18.9 PNEUMONIA OF BOTH LUNGS DUE TO INFECTIOUS ORGANISM: Status: RESOLVED | Noted: 2022-09-09 | Resolved: 2022-12-19

## 2023-05-26 ENCOUNTER — PES CALL (OUTPATIENT)
Dept: ADMINISTRATIVE | Facility: CLINIC | Age: 78
End: 2023-05-26
Payer: MEDICARE

## 2023-08-02 ENCOUNTER — OUTSIDE PLACE OF SERVICE (OUTPATIENT)
Dept: ADMINISTRATIVE | Facility: OTHER | Age: 78
End: 2023-08-02
Payer: MEDICARE

## 2023-08-02 PROCEDURE — 99307 SBSQ NF CARE SF MDM 10: CPT | Mod: ,,, | Performed by: FAMILY MEDICINE

## 2023-08-02 PROCEDURE — 99307 PR NURSING FAC CARE, SUBSEQ, IMPROVING: ICD-10-PCS | Mod: ,,, | Performed by: FAMILY MEDICINE

## 2023-08-29 ENCOUNTER — HOSPITAL ENCOUNTER (INPATIENT)
Facility: HOSPITAL | Age: 78
LOS: 3 days | Discharge: HOME | DRG: 177 | End: 2023-09-01
Attending: EMERGENCY MEDICINE | Admitting: FAMILY MEDICINE
Payer: MEDICARE

## 2023-08-29 DIAGNOSIS — R00.0 TACHYCARDIA: ICD-10-CM

## 2023-08-29 DIAGNOSIS — R00.0 SINUS TACHYCARDIA: ICD-10-CM

## 2023-08-29 DIAGNOSIS — J69.0 ASPIRATION PNEUMONITIS: Primary | ICD-10-CM

## 2023-08-29 DIAGNOSIS — T17.908A ASPIRATION INTO AIRWAY: ICD-10-CM

## 2023-08-29 DIAGNOSIS — A41.9 SEPSIS, DUE TO UNSPECIFIED ORGANISM, UNSPECIFIED WHETHER ACUTE ORGAN DYSFUNCTION PRESENT: ICD-10-CM

## 2023-08-29 LAB
ALBUMIN SERPL BCP-MCNC: 2.6 G/DL (ref 3.5–5.2)
ALLENS TEST: ABNORMAL
ALP SERPL-CCNC: 75 U/L (ref 55–135)
ALT SERPL W/O P-5'-P-CCNC: 21 U/L (ref 10–44)
ANION GAP SERPL CALC-SCNC: 9 MMOL/L (ref 8–16)
AST SERPL-CCNC: 36 U/L (ref 10–40)
BACTERIA #/AREA URNS HPF: ABNORMAL /HPF
BASOPHILS # BLD AUTO: 0.01 K/UL (ref 0–0.2)
BASOPHILS NFR BLD: 0.1 % (ref 0–1.9)
BILIRUB SERPL-MCNC: 1.2 MG/DL (ref 0.1–1)
BILIRUB UR QL STRIP: NEGATIVE
BUN SERPL-MCNC: 27 MG/DL (ref 8–23)
CALCIUM SERPL-MCNC: 9.3 MG/DL (ref 8.7–10.5)
CHLORIDE SERPL-SCNC: 104 MMOL/L (ref 95–110)
CLARITY UR: ABNORMAL
CO2 SERPL-SCNC: 24 MMOL/L (ref 23–29)
COLOR UR: YELLOW
CREAT SERPL-MCNC: 0.8 MG/DL (ref 0.5–1.4)
DIFFERENTIAL METHOD: ABNORMAL
EOSINOPHIL # BLD AUTO: 0 K/UL (ref 0–0.5)
EOSINOPHIL NFR BLD: 0 % (ref 0–8)
ERYTHROCYTE [DISTWIDTH] IN BLOOD BY AUTOMATED COUNT: 17.8 % (ref 11.5–14.5)
EST. GFR  (NO RACE VARIABLE): >60 ML/MIN/1.73 M^2
FIO2: 30 %
GLUCOSE SERPL-MCNC: 154 MG/DL (ref 70–110)
GLUCOSE UR QL STRIP: NEGATIVE
HCT VFR BLD AUTO: 29.1 % (ref 40–54)
HGB BLD-MCNC: 9.4 G/DL (ref 14–18)
HGB UR QL STRIP: ABNORMAL
HYALINE CASTS #/AREA URNS LPF: 0 /LPF
IMM GRANULOCYTES # BLD AUTO: 0.07 K/UL (ref 0–0.04)
IMM GRANULOCYTES NFR BLD AUTO: 0.7 % (ref 0–0.5)
KETONES UR QL STRIP: NEGATIVE
LACTATE SERPL-SCNC: 2.1 MMOL/L (ref 0.5–2.2)
LEUKOCYTE ESTERASE UR QL STRIP: NEGATIVE
LPM: 4
LYMPHOCYTES # BLD AUTO: 0.6 K/UL (ref 1–4.8)
LYMPHOCYTES NFR BLD: 5.9 % (ref 18–48)
MCH RBC QN AUTO: 33.2 PG (ref 27–31)
MCHC RBC AUTO-ENTMCNC: 32.3 G/DL (ref 32–36)
MCV RBC AUTO: 103 FL (ref 82–98)
MICROSCOPIC COMMENT: ABNORMAL
MONOCYTES # BLD AUTO: 1.1 K/UL (ref 0.3–1)
MONOCYTES NFR BLD: 11.1 % (ref 4–15)
NEUTROPHILS # BLD AUTO: 8.2 K/UL (ref 1.8–7.7)
NEUTROPHILS NFR BLD: 82.2 % (ref 38–73)
NITRITE UR QL STRIP: NEGATIVE
NRBC BLD-RTO: 0 /100 WBC
PCO2 BLDA: 48.4 MMHG (ref 35–45)
PH SMN: 7.39 [PH] (ref 7.35–7.45)
PH UR STRIP: 5 [PH] (ref 5–8)
PLATELET # BLD AUTO: 226 K/UL (ref 150–450)
PMV BLD AUTO: 10.3 FL (ref 9.2–12.9)
PO2 BLDA: 12.3 MMHG (ref 40–60)
POC BASE DEFICIT: 3.7 MMOL/L (ref -2–2)
POC HCO3: 29.3 MMOL/L (ref 24–28)
POC PERFORMED BY: ABNORMAL
POC SATURATED O2: 10.7 % (ref 95–100)
POTASSIUM SERPL-SCNC: 3.7 MMOL/L (ref 3.5–5.1)
PROT SERPL-MCNC: 6.1 G/DL (ref 6–8.4)
PROT UR QL STRIP: ABNORMAL
RBC # BLD AUTO: 2.83 M/UL (ref 4.6–6.2)
RBC #/AREA URNS HPF: >100 /HPF (ref 0–4)
SODIUM SERPL-SCNC: 137 MMOL/L (ref 136–145)
SP GR UR STRIP: 1.02 (ref 1–1.03)
SPECIMEN SOURCE: ABNORMAL
URN SPEC COLLECT METH UR: ABNORMAL
UROBILINOGEN UR STRIP-ACNC: NEGATIVE EU/DL
WBC # BLD AUTO: 9.95 K/UL (ref 3.9–12.7)
WBC #/AREA URNS HPF: 0 /HPF (ref 0–5)

## 2023-08-29 PROCEDURE — 82803 BLOOD GASES ANY COMBINATION: CPT | Mod: HCNC

## 2023-08-29 PROCEDURE — 93010 ELECTROCARDIOGRAM REPORT: CPT | Mod: HCNC,,, | Performed by: INTERNAL MEDICINE

## 2023-08-29 PROCEDURE — 12000002 HC ACUTE/MED SURGE SEMI-PRIVATE ROOM: Mod: HCNC

## 2023-08-29 PROCEDURE — 96365 THER/PROPH/DIAG IV INF INIT: CPT | Mod: HCNC

## 2023-08-29 PROCEDURE — 99285 EMERGENCY DEPT VISIT HI MDM: CPT | Mod: 25,HCNC

## 2023-08-29 PROCEDURE — 81000 URINALYSIS NONAUTO W/SCOPE: CPT | Mod: HCNC | Performed by: EMERGENCY MEDICINE

## 2023-08-29 PROCEDURE — 25000003 PHARM REV CODE 250: Mod: HCNC | Performed by: EMERGENCY MEDICINE

## 2023-08-29 PROCEDURE — 63600175 PHARM REV CODE 636 W HCPCS: Mod: HCNC | Performed by: EMERGENCY MEDICINE

## 2023-08-29 PROCEDURE — 80053 COMPREHEN METABOLIC PANEL: CPT | Mod: HCNC | Performed by: EMERGENCY MEDICINE

## 2023-08-29 PROCEDURE — 99900035 HC TECH TIME PER 15 MIN (STAT): Mod: HCNC

## 2023-08-29 PROCEDURE — 87040 BLOOD CULTURE FOR BACTERIA: CPT | Mod: 59,HCNC | Performed by: EMERGENCY MEDICINE

## 2023-08-29 PROCEDURE — 83605 ASSAY OF LACTIC ACID: CPT | Mod: HCNC | Performed by: EMERGENCY MEDICINE

## 2023-08-29 PROCEDURE — 85025 COMPLETE CBC W/AUTO DIFF WBC: CPT | Mod: HCNC | Performed by: EMERGENCY MEDICINE

## 2023-08-29 PROCEDURE — 93005 ELECTROCARDIOGRAM TRACING: CPT | Mod: HCNC

## 2023-08-29 PROCEDURE — 93010 EKG 12-LEAD: ICD-10-PCS | Mod: HCNC,,, | Performed by: INTERNAL MEDICINE

## 2023-08-29 PROCEDURE — 51701 INSERT BLADDER CATHETER: CPT | Mod: HCNC

## 2023-08-29 RX ORDER — INSULIN ASPART 100 [IU]/ML
1-10 INJECTION, SOLUTION INTRAVENOUS; SUBCUTANEOUS
Status: DISCONTINUED | OUTPATIENT
Start: 2023-08-29 | End: 2023-08-30

## 2023-08-29 RX ORDER — AMOXICILLIN 250 MG
1 CAPSULE ORAL 2 TIMES DAILY PRN
Status: DISCONTINUED | OUTPATIENT
Start: 2023-08-29 | End: 2023-09-01 | Stop reason: HOSPADM

## 2023-08-29 RX ORDER — SODIUM CHLORIDE, SODIUM LACTATE, POTASSIUM CHLORIDE, CALCIUM CHLORIDE 600; 310; 30; 20 MG/100ML; MG/100ML; MG/100ML; MG/100ML
INJECTION, SOLUTION INTRAVENOUS CONTINUOUS
Status: ACTIVE | OUTPATIENT
Start: 2023-08-30 | End: 2023-08-30

## 2023-08-29 RX ORDER — TALC
9 POWDER (GRAM) TOPICAL NIGHTLY PRN
Status: DISCONTINUED | OUTPATIENT
Start: 2023-08-29 | End: 2023-09-01 | Stop reason: HOSPADM

## 2023-08-29 RX ORDER — IBUPROFEN 200 MG
16 TABLET ORAL
Status: DISCONTINUED | OUTPATIENT
Start: 2023-08-29 | End: 2023-09-01 | Stop reason: HOSPADM

## 2023-08-29 RX ORDER — SODIUM CHLORIDE 0.9 % (FLUSH) 0.9 %
5 SYRINGE (ML) INJECTION
Status: DISCONTINUED | OUTPATIENT
Start: 2023-08-29 | End: 2023-09-01 | Stop reason: HOSPADM

## 2023-08-29 RX ORDER — ATORVASTATIN CALCIUM 20 MG/1
20 TABLET, FILM COATED ORAL DAILY
Status: DISCONTINUED | OUTPATIENT
Start: 2023-08-30 | End: 2023-09-01 | Stop reason: HOSPADM

## 2023-08-29 RX ORDER — GLUCAGON 1 MG
1 KIT INJECTION
Status: DISCONTINUED | OUTPATIENT
Start: 2023-08-29 | End: 2023-09-01 | Stop reason: HOSPADM

## 2023-08-29 RX ORDER — ACETAMINOPHEN 325 MG/1
650 TABLET ORAL EVERY 8 HOURS PRN
Status: DISCONTINUED | OUTPATIENT
Start: 2023-08-29 | End: 2023-09-01 | Stop reason: HOSPADM

## 2023-08-29 RX ORDER — CHOLECALCIFEROL (VITAMIN D3) 25 MCG
1000 TABLET ORAL DAILY
Status: DISCONTINUED | OUTPATIENT
Start: 2023-08-30 | End: 2023-09-01 | Stop reason: HOSPADM

## 2023-08-29 RX ORDER — IBUPROFEN 200 MG
24 TABLET ORAL
Status: DISCONTINUED | OUTPATIENT
Start: 2023-08-29 | End: 2023-09-01 | Stop reason: HOSPADM

## 2023-08-29 RX ORDER — ASCORBIC ACID 500 MG
500 TABLET ORAL DAILY
Status: DISCONTINUED | OUTPATIENT
Start: 2023-08-30 | End: 2023-09-01 | Stop reason: HOSPADM

## 2023-08-29 RX ORDER — ASPIRIN 81 MG/1
81 TABLET ORAL DAILY
Status: DISCONTINUED | OUTPATIENT
Start: 2023-08-30 | End: 2023-09-01 | Stop reason: HOSPADM

## 2023-08-29 RX ORDER — AMLODIPINE BESYLATE 2.5 MG/1
2.5 TABLET ORAL DAILY
Status: DISCONTINUED | OUTPATIENT
Start: 2023-08-30 | End: 2023-09-01 | Stop reason: HOSPADM

## 2023-08-29 RX ADMIN — CEFTRIAXONE SODIUM 1 G: 1 INJECTION, POWDER, FOR SOLUTION INTRAMUSCULAR; INTRAVENOUS at 09:08

## 2023-08-30 PROBLEM — R00.0 SINUS TACHYCARDIA: Status: ACTIVE | Noted: 2023-08-30

## 2023-08-30 PROBLEM — J69.0 ASPIRATION PNEUMONITIS: Status: ACTIVE | Noted: 2023-08-30

## 2023-08-30 PROBLEM — U07.1 COVID: Status: ACTIVE | Noted: 2023-08-30

## 2023-08-30 PROBLEM — R11.10 VOMITING: Status: ACTIVE | Noted: 2023-08-30

## 2023-08-30 LAB
ALBUMIN SERPL BCP-MCNC: 2.6 G/DL (ref 3.5–5.2)
ALP SERPL-CCNC: 72 U/L (ref 55–135)
ALT SERPL W/O P-5'-P-CCNC: 21 U/L (ref 10–44)
ANION GAP SERPL CALC-SCNC: 9 MMOL/L (ref 8–16)
ASCENDING AORTA: 3.36 CM
AST SERPL-CCNC: 34 U/L (ref 10–40)
AV INDEX (PROSTH): 1.12
AV MEAN GRADIENT: 3 MMHG
AV PEAK GRADIENT: 4 MMHG
AV VALVE AREA BY VELOCITY RATIO: 3.99 CM²
AV VALVE AREA: 4.91 CM²
AV VELOCITY RATIO: 0.91
BASOPHILS # BLD AUTO: 0.01 K/UL (ref 0–0.2)
BASOPHILS NFR BLD: 0.1 % (ref 0–1.9)
BILIRUB SERPL-MCNC: 1.1 MG/DL (ref 0.1–1)
BSA FOR ECHO PROCEDURE: 1.88 M2
BUN SERPL-MCNC: 26 MG/DL (ref 8–23)
CALCIUM SERPL-MCNC: 9.2 MG/DL (ref 8.7–10.5)
CHLORIDE SERPL-SCNC: 104 MMOL/L (ref 95–110)
CO2 SERPL-SCNC: 24 MMOL/L (ref 23–29)
CREAT SERPL-MCNC: 0.8 MG/DL (ref 0.5–1.4)
CV ECHO LV RWT: 0.38 CM
DIFFERENTIAL METHOD: ABNORMAL
DOP CALC AO PEAK VEL: 1.03 M/S
DOP CALC AO VTI: 15.5 CM
DOP CALC LVOT AREA: 4.4 CM2
DOP CALC LVOT DIAMETER: 2.36 CM
DOP CALC LVOT PEAK VEL: 0.94 M/S
DOP CALC LVOT STROKE VOLUME: 76.08 CM3
DOP CALC MV VTI: 19.1 CM
DOP CALCLVOT PEAK VEL VTI: 17.4 CM
E WAVE DECELERATION TIME: 261.82 MSEC
E/A RATIO: 0.8
E/E' RATIO: 9.38 M/S
ECHO LV POSTERIOR WALL: 0.77 CM (ref 0.6–1.1)
EJECTION FRACTION: 55 %
EOSINOPHIL # BLD AUTO: 0 K/UL (ref 0–0.5)
EOSINOPHIL NFR BLD: 0 % (ref 0–8)
ERYTHROCYTE [DISTWIDTH] IN BLOOD BY AUTOMATED COUNT: 17.8 % (ref 11.5–14.5)
EST. GFR  (NO RACE VARIABLE): >60 ML/MIN/1.73 M^2
ESTIMATED AVG GLUCOSE: 103 MG/DL (ref 68–131)
FOLATE SERPL-MCNC: 8.3 NG/ML (ref 4–24)
FRACTIONAL SHORTENING: 27 % (ref 28–44)
GLUCOSE SERPL-MCNC: 151 MG/DL (ref 70–110)
HBA1C MFR BLD: 5.2 % (ref 4–5.6)
HCT VFR BLD AUTO: 30 % (ref 40–54)
HGB BLD-MCNC: 9.5 G/DL (ref 14–18)
IMM GRANULOCYTES # BLD AUTO: 0.09 K/UL (ref 0–0.04)
IMM GRANULOCYTES NFR BLD AUTO: 0.8 % (ref 0–0.5)
INTERVENTRICULAR SEPTUM: 0.7 CM (ref 0.6–1.1)
IVRT: 145.58 MSEC
LA MAJOR: 4.61 CM
LA MINOR: 4.78 CM
LA WIDTH: 4.7 CM
LEFT ATRIUM SIZE: 3.76 CM
LEFT ATRIUM VOLUME INDEX MOD: 27.4 ML/M2
LEFT ATRIUM VOLUME INDEX: 36.7 ML/M2
LEFT ATRIUM VOLUME MOD: 52.7 CM3
LEFT ATRIUM VOLUME: 70.5 CM3
LEFT INTERNAL DIMENSION IN SYSTOLE: 2.98 CM (ref 2.1–4)
LEFT VENTRICLE DIASTOLIC VOLUME INDEX: 38.1 ML/M2
LEFT VENTRICLE DIASTOLIC VOLUME: 73.15 ML
LEFT VENTRICLE MASS INDEX: 45 G/M2
LEFT VENTRICLE SYSTOLIC VOLUME INDEX: 17.9 ML/M2
LEFT VENTRICLE SYSTOLIC VOLUME: 34.42 ML
LEFT VENTRICULAR INTERNAL DIMENSION IN DIASTOLE: 4.07 CM (ref 3.5–6)
LEFT VENTRICULAR MASS: 85.97 G
LIPASE SERPL-CCNC: 10 U/L (ref 4–60)
LV LATERAL E/E' RATIO: 7.63 M/S
LV SEPTAL E/E' RATIO: 12.2 M/S
LVOT MG: 2.14 MMHG
LVOT MV: 0.71 CM/S
LYMPHOCYTES # BLD AUTO: 0.5 K/UL (ref 1–4.8)
LYMPHOCYTES NFR BLD: 4.1 % (ref 18–48)
MAGNESIUM SERPL-MCNC: 2 MG/DL (ref 1.6–2.6)
MCH RBC QN AUTO: 32.9 PG (ref 27–31)
MCHC RBC AUTO-ENTMCNC: 31.7 G/DL (ref 32–36)
MCV RBC AUTO: 104 FL (ref 82–98)
MONOCYTES # BLD AUTO: 1 K/UL (ref 0.3–1)
MONOCYTES NFR BLD: 8.9 % (ref 4–15)
MV MEAN GRADIENT: 2 MMHG
MV PEAK A VEL: 0.76 M/S
MV PEAK E VEL: 0.61 M/S
MV PEAK GRADIENT: 5 MMHG
MV STENOSIS PRESSURE HALF TIME: 75.93 MS
MV VALVE AREA BY CONTINUITY EQUATION: 3.98 CM2
MV VALVE AREA P 1/2 METHOD: 2.9 CM2
NEUTROPHILS # BLD AUTO: 9.8 K/UL (ref 1.8–7.7)
NEUTROPHILS NFR BLD: 86.1 % (ref 38–73)
NRBC BLD-RTO: 0 /100 WBC
OHS LV EJECTION FRACTION SIMPSONS BIPLANE MOD: 59 %
PHOSPHATE SERPL-MCNC: 2.1 MG/DL (ref 2.7–4.5)
PISA TR MAX VEL: 2.52 M/S
PLATELET # BLD AUTO: 230 K/UL (ref 150–450)
PMV BLD AUTO: 10.9 FL (ref 9.2–12.9)
POTASSIUM SERPL-SCNC: 3.7 MMOL/L (ref 3.5–5.1)
PROCALCITONIN SERPL IA-MCNC: 0.21 NG/ML
PROT SERPL-MCNC: 6.1 G/DL (ref 6–8.4)
PULM VEIN S/D RATIO: 1.76
PV PEAK D VEL: 0.17 M/S
PV PEAK S VEL: 0.3 M/S
RA MAJOR: 5.1 CM
RA WIDTH: 3.61 CM
RBC # BLD AUTO: 2.89 M/UL (ref 4.6–6.2)
RIGHT VENTRICULAR END-DIASTOLIC DIMENSION: 2.64 CM
RV TISSUE DOPPLER FREE WALL SYSTOLIC VELOCITY 1 (APICAL 4 CHAMBER VIEW): 9.65 CM/S
SARS-COV-2 RDRP RESP QL NAA+PROBE: POSITIVE
SINUS: 4.3 CM
SODIUM SERPL-SCNC: 137 MMOL/L (ref 136–145)
STJ: 4 CM
TDI LATERAL: 0.08 M/S
TDI SEPTAL: 0.05 M/S
TDI: 0.07 M/S
TR MAX PG: 25 MMHG
TRICUSPID ANNULAR PLANE SYSTOLIC EXCURSION: 1.62 CM
TSH SERPL DL<=0.005 MIU/L-ACNC: 0.88 UIU/ML (ref 0.4–4)
VIT B12 SERPL-MCNC: 541 PG/ML (ref 210–950)
WBC # BLD AUTO: 11.41 K/UL (ref 3.9–12.7)
Z-SCORE OF LEFT VENTRICULAR DIMENSION IN END DIASTOLE: -2.86
Z-SCORE OF LEFT VENTRICULAR DIMENSION IN END SYSTOLE: -0.89

## 2023-08-30 PROCEDURE — 84100 ASSAY OF PHOSPHORUS: CPT | Mod: HCNC

## 2023-08-30 PROCEDURE — 99900035 HC TECH TIME PER 15 MIN (STAT): Mod: HCNC

## 2023-08-30 PROCEDURE — U0002 COVID-19 LAB TEST NON-CDC: HCPCS | Mod: HCNC

## 2023-08-30 PROCEDURE — 82607 VITAMIN B-12: CPT | Mod: HCNC

## 2023-08-30 PROCEDURE — 85025 COMPLETE CBC W/AUTO DIFF WBC: CPT | Mod: HCNC

## 2023-08-30 PROCEDURE — 84443 ASSAY THYROID STIM HORMONE: CPT | Mod: HCNC

## 2023-08-30 PROCEDURE — 83690 ASSAY OF LIPASE: CPT | Mod: HCNC

## 2023-08-30 PROCEDURE — 80053 COMPREHEN METABOLIC PANEL: CPT | Mod: HCNC

## 2023-08-30 PROCEDURE — 83036 HEMOGLOBIN GLYCOSYLATED A1C: CPT | Mod: HCNC

## 2023-08-30 PROCEDURE — 92610 EVALUATE SWALLOWING FUNCTION: CPT | Mod: HCNC

## 2023-08-30 PROCEDURE — 11000001 HC ACUTE MED/SURG PRIVATE ROOM: Mod: HCNC

## 2023-08-30 PROCEDURE — 84145 PROCALCITONIN (PCT): CPT | Mod: HCNC

## 2023-08-30 PROCEDURE — 92526 ORAL FUNCTION THERAPY: CPT | Mod: HCNC

## 2023-08-30 PROCEDURE — 27000207 HC ISOLATION: Mod: HCNC

## 2023-08-30 PROCEDURE — 94761 N-INVAS EAR/PLS OXIMETRY MLT: CPT | Mod: HCNC

## 2023-08-30 PROCEDURE — 83735 ASSAY OF MAGNESIUM: CPT | Mod: HCNC

## 2023-08-30 PROCEDURE — 82746 ASSAY OF FOLIC ACID SERUM: CPT | Mod: HCNC

## 2023-08-30 PROCEDURE — 27000221 HC OXYGEN, UP TO 24 HOURS: Mod: HCNC

## 2023-08-30 PROCEDURE — 25000003 PHARM REV CODE 250: Mod: HCNC

## 2023-08-30 PROCEDURE — 63600175 PHARM REV CODE 636 W HCPCS: Mod: HCNC

## 2023-08-30 PROCEDURE — 36415 COLL VENOUS BLD VENIPUNCTURE: CPT | Mod: HCNC

## 2023-08-30 PROCEDURE — 25000003 PHARM REV CODE 250: Mod: HCNC | Performed by: FAMILY MEDICINE

## 2023-08-30 RX ORDER — SODIUM,POTASSIUM PHOSPHATES 280-250MG
2 POWDER IN PACKET (EA) ORAL ONCE
Status: COMPLETED | OUTPATIENT
Start: 2023-08-30 | End: 2023-08-30

## 2023-08-30 RX ORDER — MUPIROCIN 20 MG/G
OINTMENT TOPICAL 2 TIMES DAILY
Status: DISCONTINUED | OUTPATIENT
Start: 2023-08-30 | End: 2023-09-01 | Stop reason: HOSPADM

## 2023-08-30 RX ORDER — ONDANSETRON 2 MG/ML
4 INJECTION INTRAMUSCULAR; INTRAVENOUS EVERY 6 HOURS PRN
Status: DISCONTINUED | OUTPATIENT
Start: 2023-08-30 | End: 2023-09-01 | Stop reason: HOSPADM

## 2023-08-30 RX ADMIN — DEXAMETHASONE 6 MG: 4 TABLET ORAL at 05:08

## 2023-08-30 RX ADMIN — POTASSIUM & SODIUM PHOSPHATES POWDER PACK 280-160-250 MG 2 PACKET: 280-160-250 PACK at 05:08

## 2023-08-30 RX ADMIN — SODIUM CHLORIDE, POTASSIUM CHLORIDE, SODIUM LACTATE AND CALCIUM CHLORIDE: 600; 310; 30; 20 INJECTION, SOLUTION INTRAVENOUS at 02:08

## 2023-08-30 RX ADMIN — CHOLECALCIFEROL TAB 25 MCG (1000 UNIT) 1000 UNITS: 25 TAB at 08:08

## 2023-08-30 RX ADMIN — REMDESIVIR 200 MG: 100 INJECTION, POWDER, LYOPHILIZED, FOR SOLUTION INTRAVENOUS at 05:08

## 2023-08-30 RX ADMIN — OXYCODONE HYDROCHLORIDE AND ACETAMINOPHEN 500 MG: 500 TABLET ORAL at 08:08

## 2023-08-30 RX ADMIN — MUPIROCIN: 20 OINTMENT TOPICAL at 08:08

## 2023-08-30 RX ADMIN — APIXABAN 5 MG: 5 TABLET, FILM COATED ORAL at 09:08

## 2023-08-30 RX ADMIN — ATORVASTATIN CALCIUM 20 MG: 20 TABLET, FILM COATED ORAL at 08:08

## 2023-08-30 RX ADMIN — CEFTRIAXONE SODIUM 1 G: 1 INJECTION, POWDER, FOR SOLUTION INTRAMUSCULAR; INTRAVENOUS at 09:08

## 2023-08-30 RX ADMIN — MUPIROCIN: 20 OINTMENT TOPICAL at 09:08

## 2023-08-30 RX ADMIN — APIXABAN 5 MG: 5 TABLET, FILM COATED ORAL at 08:08

## 2023-08-30 RX ADMIN — ASPIRIN 81 MG: 81 TABLET, COATED ORAL at 08:08

## 2023-08-30 NOTE — PLAN OF CARE
[facility] - Telemetry  Initial Discharge Assessment       Primary Care Provider: Ahsan Marinelli MD    Admission Diagnosis: Tachycardia [R00.0]  Aspiration into airway [T17.908A]  Sepsis, due to unspecified organism, unspecified whether acute organ dysfunction present [A41.9]    Admission Date: 8/29/2023  Expected Discharge Date:     Attempted to DCA with pt but confusion noted. Attempted to call friend and number is a wrong number. Called son. Awaiting return call. Pt is resident of [facility]. Plan to return at [place]. Pt C19+ as of 8/30/2023.    Transition of Care Barriers: (P) None    Payor: [employer] MEDICARE / Plan: [employer] MEDICARE HMO / Product Type: Capitation /     Extended Emergency Contact Information  Primary Emergency Contact: Axel Wong   [facility]  Home Phone: [phone]  Mobile Phone: [phone]  Relation: Son  Secondary Emergency Contact: Jannet Rod   [facility]  Home Phone: [phone]  Relation: Friend    Discharge Plan A: (P) Return to nursing home       No Pharmacies Listed    Initial Assessment (most recent)       Adult Discharge Assessment - 08/30/23 1514          Discharge Assessment    Assessment Type Discharge Planning Assessment (P)      Source of Information health record (P)      People in Home facility resident (P)      Facility Arrived From: [facility] (P)      Prior to hospitilization cognitive status: Not Oriented to Time;Not Oriented to Place (P)      Current cognitive status: Not Oriented to Time;Not Oriented to Place (P)      Discharge Plan discussed with: Adult children (P)    Axel Wong (Son)   [phone] (Mobile). Attempted to contact son. DCA to be updated with family.    Transition of Care Barriers None (P)      Discharge Plan A Return to nursing home (P)                    No future appointments.    BP (!) 107/58 (BP Location: Right arm, Patient Position: Lying)   Pulse 69   Temp 96.6 °F (35.9 °C)  "(Oral)   Resp 20   Ht 6' 2" (1.88 m)   Wt 67.6 kg (149 lb)   SpO2 97%   BMI 19.13 kg/m²      amLODIPine  2.5 mg Oral Daily    apixaban  5 mg Oral BID    ascorbic acid (vitamin C)  500 mg Oral Daily    aspirin  81 mg Oral Daily    atorvastatin  20 mg Oral Daily    cefTRIAXone (ROCEPHIN) IVPB  1 g Intravenous Q24H    dexAMETHasone  6 mg Oral Daily    mupirocin   Nasal BID    [START ON 8/31/2023] remdesivir infusion  100 mg Intravenous Daily    vitamin D  1,000 Units Oral Daily     Consults (From admission, onward)          Status Ordering Provider     IP consult to case management  Once        Provider:  (Not yet assigned)    Acknowledged KAZ ARREOLA III                       "

## 2023-08-30 NOTE — ASSESSMENT & PLAN NOTE
Vomiting (unclear how many times) non-bloody emesis in container in ER      PLAN:  Zofran prn  Mx LR  NPO pending Speech eval

## 2023-08-30 NOTE — ASSESSMENT & PLAN NOTE
Reported vomiting with possible aspiration at nursing home (no reported home oxy use)  Patient has emesis on chin, he does not recall event  Oxy requirement: SpO2 94% on 5lpm  Peripheral cyanosis on arrival  Rhonchorous bilat  Cough in recent days, afebrile (possible pneumonia)  CXR: Airspace opacities in the right upper lung zone and left lung base. No radiopaque foreign body within the aerodigestive tract.  Lactate 2.1, WBC wnl  SIRS+ for tachycardia, tachypnea, possible lung infectious source    PLAN:  1g Rocephin qd  Procal  COVID   Wean oxy as tolerated

## 2023-08-30 NOTE — ASSESSMENT & PLAN NOTE
Sinus tach 110 to 130  Denies CP, Bp stable  EKG w/wide complexes, partial RBBB (similar to previous),  no sign of ischemia    PLAN:  Mx LR  Infectious workup and treatment per aspiration pneumonitis  Echo

## 2023-08-30 NOTE — ASSESSMENT & PLAN NOTE
Patient unaware of reason for eliquis... CVA? PVD? Vericose veins, TIA/CVA listed in chart  This admit: 3+ blood on UA, >100 RBC on microscopy, nurse states cath not especially traumatic  Hgb 9.4,     PLAN:  Monitor Hbg  Continue 5mg eliquis

## 2023-08-30 NOTE — HPI
77-year-old male with past medical history of diabetes, hypertension, no home oxy use. He presents from nursing facility with concern for worsening productive cough and shortness of breath for 2-3 days, afebrile and mild AMS, according to nursing home nurses. The patient vomited this evening and possibly aspirated. He was brought by EMS hypoxic to 80s with peripheral cyanosis.   The patient has no recollection of the evening's events, but is currently A&Ox3 with no complaints. He is unaware of taking daily medicines or any other medical history. Nonsmoker, denies etoh in recent years. States he frequently ambulates w/wheelchair.    In Emergency, 110/70, 118 HR, RR 30, 98.5F, SpO2 94% on 4lpm. Labs were significant for WBC 9.9, Hgb 9.4 (baseline 10.5), , Na+ 137, K+ 3.7,  Glu 154, BUN/Cr 27/0.8 (baseline wnl) VB.39/48/V/29.9. UA 3+ blood, >100 RBC on mircorscopy. CXR showed Airspace opacities in the right upper lung zone and left lung base. No radiopaque foreign body within the aerodigestive tract. POCUS: Many A-lines, minimal B-lines, adequate cardiac wall motion. EKG showed sinus tach w/wide complexes, partial RBBB (similar to previous),  no sign of ischemia. He was put on 5lpm oxy, given 1g ceftriaxone and admitted to [facility]  for management of aspiration pneumonitis and hypoxia.

## 2023-08-30 NOTE — PLAN OF CARE
"  Problem: Adult Inpatient Plan of Care  Goal: Plan of Care Review  Outcome: Ongoing, Progressing     Problem: Adult Inpatient Plan of Care  Goal: Optimal Comfort and Wellbeing  Outcome: Ongoing, Progressing     Problem: Skin Injury Risk Increased  Goal: Skin Health and Integrity  Outcome: Ongoing, Progressing     Problem: Fall Injury Risk  Goal: Absence of Fall and Fall-Related Injury  Outcome: Ongoing, Progressing    Patient got into the unit @ 0200 hrs. Oriented the patient to the unit and the nurse call button. Vital signs assessed and documented as on the flow chart. Plan of care reviewed with the patient. On continuous IV infusion Lactated Ringer @ 125 ml/hr. Fall and safety precautions taken and the standard interventions are in place. On Telemetry monitoring with NSR, no true "red alarms' noted, no acute distress reported either in the shift. On Oxygen 3L and satting well. Advised the patient to call for the assistance. Continued monitoring the patient.      "

## 2023-08-30 NOTE — ED PROVIDER NOTES
"Encounter Date: 8/29/2023       History     Chief Complaint   Patient presents with    possible aspriation      Pt arrived via [facility] from [facility] in [facility] with c/o fever, and not acting right per nursing staff. EMS states gurgling sounds heard upon exhalation of pt.      The patient is an 78-year-old male who came to the emergency department from the [facility] after having an episode of nausea and vomiting tonight.  He now has a productive cough and fever.  The nursing staff states he was "not acting right".  The patient is complaining of some shortness of breath and a cough.    Review of patient's allergies indicates:  No Known Allergies  Past Medical History:   Diagnosis Date    Anticoagulant long-term use     Diabetes mellitus     Hypertension     Stroke     Varicose veins      Past Surgical History:   Procedure Laterality Date    VARICOSE VEIN SURGERY       History reviewed. No pertinent family history.  Social History     Tobacco Use    Smoking status: Never    Smokeless tobacco: Never   Substance Use Topics    Alcohol use: No    Drug use: No     Review of Systems   Unable to perform ROS: Age       Physical Exam     Initial Vitals   BP Pulse Resp Temp SpO2   08/29/23 1941 08/29/23 1941 08/29/23 1941 08/29/23 2011 08/29/23 1941   110/70 110 (!) 22 98.5 °F (36.9 °C) (!) 93 %      MAP       --                Physical Exam    Nursing note and vitals reviewed.  Constitutional: He appears well-developed and well-nourished.   HENT:   Head: Normocephalic and atraumatic.   Eyes: EOM are normal. Pupils are equal, round, and reactive to light.   Neck: Neck supple.   Normal range of motion.  Cardiovascular:  Normal rate, regular rhythm, normal heart sounds and intact distal pulses.           Pulmonary/Chest: He has rhonchi.   Abdominal: Abdomen is soft. Bowel sounds are normal. He exhibits no distension. There is no abdominal tenderness. There is no rebound and no guarding.   Musculoskeletal:       "   General: No edema. Normal range of motion.      Cervical back: Normal range of motion and neck supple.     Neurological: He is alert and oriented to person, place, and time.   Skin: Skin is warm and dry.   Psychiatric: He has a normal mood and affect. His behavior is normal. Judgment and thought content normal.       ED Course   Critical Care    Date/Time: 8/29/2023 9:05 PM    Performed by: Gracie Swift MD  Authorized by: Gracie Swift MD  Total critical care time (exclusive of procedural time) : 40 minutes  Critical care time was exclusive of separately billable procedures and treating other patients.  Critical care was necessary to treat or prevent imminent or life-threatening deterioration of the following conditions: respiratory failure.  Critical care was time spent personally by me on the following activities: development of treatment plan with patient or surrogate, evaluation of patient's response to treatment, examination of patient, obtaining history from patient or surrogate, ordering and performing treatments and interventions, ordering and review of laboratory studies, ordering and review of radiographic studies, pulse oximetry and re-evaluation of patient's condition.      Labs Reviewed   COMPREHENSIVE METABOLIC PANEL - Abnormal; Notable for the following components:       Result Value    Glucose 154 (*)     BUN 27 (*)     Albumin 2.6 (*)     Total Bilirubin 1.2 (*)     All other components within normal limits   CBC W/ AUTO DIFFERENTIAL - Abnormal; Notable for the following components:    RBC 2.83 (*)     Hemoglobin 9.4 (*)     Hematocrit 29.1 (*)      (*)     MCH 33.2 (*)     RDW 17.8 (*)     Immature Granulocytes 0.7 (*)     Gran # (ANC) 8.2 (*)     Immature Grans (Abs) 0.07 (*)     Lymph # 0.6 (*)     Mono # 1.1 (*)     Gran % 82.2 (*)     Lymph % 5.9 (*)     All other components within normal limits   URINALYSIS, REFLEX TO URINE CULTURE - Abnormal; Notable for the following components:     Appearance, UA Hazy (*)     Protein, UA 1+ (*)     Occult Blood UA 3+ (*)     All other components within normal limits    Narrative:     Specimen Source->Urine   URINALYSIS MICROSCOPIC - Abnormal; Notable for the following components:    RBC, UA >100 (*)     All other components within normal limits    Narrative:     Specimen Source->Urine   CULTURE, BLOOD   CULTURE, BLOOD   LACTIC ACID, PLASMA     EKG Readings: (Independently Interpreted)   Initial: 2019. Rhythm: Sinus Tachycardia. Heart Rate: 112. Conduction: RBBB. Axis: Left Axis Deviation.       Imaging Results              X-Ray Chest 1 View (Final result)  Result time 08/29/23 21:45:16      Final result by James Gamble MD (08/29/23 21:45:16)                   Impression:      Airspace opacities in the right upper lung zone and left lung base.    No radiopaque foreign body within the aerodigestive tract.      Electronically signed by: James Gamble MD  Date:    08/29/2023  Time:    21:45               Narrative:    EXAMINATION:  XR CHEST 1 VIEW    CLINICAL HISTORY:  Unspecified foreign body in respiratory tract, part unspecified causing other injury, initial encounter    TECHNIQUE:  Single frontal view of the chest was performed.    COMPARISON:  09/09/2022.    FINDINGS:  Monitoring EKG leads are present.  There is a loop recorder present.  The trachea is unremarkable.  The cardiomediastinal silhouette is unchanged.  There are no pleural effusions.  There is no evidence of a pneumothorax.  There is an airspace opacity in the right upper lung zone.  There is also an airspace opacity in the left lung base.  There are degenerative changes in the osseous structures.                                       Medications   cefTRIAXone (ROCEPHIN) 1 g in dextrose 5 % in water (D5W) 100 mL IVPB (MB+) (1 g Intravenous New Bag 8/29/23 7626)     Medical Decision Making  Amount and/or Complexity of Data Reviewed  Labs: ordered. Decision-making details documented in ED  Course.  Radiology: ordered and independent interpretation performed.  ECG/medicine tests: ordered and independent interpretation performed. Decision-making details documented in ED Course.  Discussion of management or test interpretation with external provider(s): The patient is an 78-year-old male who [facility] after having an episode of nausea vomiting then followed by shortness of breath.  The patient is hypoxic in the emergency department and likely aspirated.  The patient was covered with Rocephin for pneumonia and will be admitted to the [facility] for ongoing hospitalization and treatment.    Risk  Decision regarding hospitalization.               ED Course as of 08/29/23 2156   Tue Aug 29, 2023   2030 Oxygen saturation were 88% on room air.  The patient is 94% on 4 L nasal cannula. [ST]   2052 The patient meets SIRS criteria and likely has pneumonia. Code sepsis called. [ST]   2145 Urinalysis Microscopic(!) [ST]   2146 Urinalysis, Reflex to Urine Culture Urine, Catheterized(!) [ST]   2146 Lactic acid, plasma #1 [ST]   2146 Comprehensive metabolic panel(!) [ST]   2146 CBC auto differential(!) [ST]      ED Course User Index  [ST] Gracie Swift MD                    Clinical Impression:   Final diagnoses:  [T17.908A] Aspiration into airway  [A41.9] Sepsis, due to unspecified organism, unspecified whether acute organ dysfunction present (Primary)        ED Disposition Condition    Observation Stable                Gracie Swift MD  08/29/23 2158       Gracie Swift MD  09/08/23 0102

## 2023-08-30 NOTE — PROGRESS NOTES
[facility] Medicine  Progress Note    Patient Name: Axel Burgos  MRN: [id number]  Patient Class: IP- Inpatient   Admission Date: 2023  Length of Stay: 1 days  Attending Physician: Kyle Lfaleur MD  Primary Care Provider: Ahsan Marinelli MD        Subjective:     Principal Problem:Aspiration pneumonitis        HPI:  77-year-old male with past medical history of diabetes, hypertension, no home oxy use. He presents from nursing facility with concern for worsening productive cough and shortness of breath for 2-3 days, afebrile and mild AMS, according to nursing home nurses. The patient vomited this evening and possibly aspirated. He was brought by EMS hypoxic to 80s with peripheral cyanosis.   The patient has no recollection of the evening's events, but is currently A&Ox3 with no complaints. He is unaware of taking daily medicines or any other medical history. Nonsmoker, denies etoh in recent years. States he frequently ambulates w/wheelchair.    In Emergency, 110/70, 118 HR, RR 30, 98.5F, SpO2 94% on 4lpm. Labs were significant for WBC 9.9, Hgb 9.4 (baseline 10.5), , Na+ 137, K+ 3.7,  Glu 154, BUN/Cr 27/0.8 (baseline wnl) VB.39/48/V/29.9. UA 3+ blood, >100 RBC on mircorscopy. CXR showed Airspace opacities in the right upper lung zone and left lung base. No radiopaque foreign body within the aerodigestive tract. POCUS: Many A-lines, minimal B-lines, adequate cardiac wall motion. EKG showed sinus tach w/wide complexes, partial RBBB (similar to previous),  no sign of ischemia. He was put on 5lpm oxy, given 1g ceftriaxone and admitted to [facility] for management of aspiration pneumonitis and hypoxia.      Overview/Hospital Course:  No notes on file    Interval History: Patient is sleeping soundly and, it appears, comfortably. Difficult to rouse, but will wake briefly to answer question or follow command and then falls back asleep.    Review of Systems   Reason unable to perform  ROS: Patient does not respond because sleeping.     Objective:     Vital Signs (Most Recent):  Temp: 96.6 °F (35.9 °C) (08/30/23 1653)  Pulse: 79 (08/30/23 1653)  Resp: 20 (08/30/23 1653)  BP: (!) 91/53 (08/30/23 1653)  SpO2: 97 % (08/30/23 1653) Vital Signs (24h Range):  Temp:  [96.6 °F (35.9 °C)-98.7 °F (37.1 °C)] 96.6 °F (35.9 °C)  Pulse:  [] 79  Resp:  [18-30] 20  SpO2:  [92 %-98 %] 97 %  BP: ()/(53-73) 91/53     Weight: 67.6 kg (149 lb)  Body mass index is 19.13 kg/m².    Intake/Output Summary (Last 24 hours) at 8/30/2023 1835  Last data filed at 8/30/2023 1735  Gross per 24 hour   Intake 250 ml   Output --   Net 250 ml         Physical Exam  Vitals and nursing note reviewed.   Constitutional:       General: He is sleeping. He is not in acute distress.     Appearance: He is not ill-appearing or toxic-appearing.      Interventions: He is not intubated.Nasal cannula in place.   HENT:      Head: Normocephalic.   Cardiovascular:      Rate and Rhythm: Normal rate and regular rhythm.      Heart sounds: Normal heart sounds.   Pulmonary:      Effort: Pulmonary effort is normal. No accessory muscle usage, prolonged expiration or respiratory distress. He is not intubated.      Breath sounds: Normal breath sounds and air entry. Transmitted upper airway sounds (snoring) present.   Abdominal:      General: Bowel sounds are normal.      Palpations: Abdomen is soft.      Tenderness: There is no abdominal tenderness.   Neurological:      Mental Status: He is lethargic.             Significant Labs: All pertinent labs within the past 24 hours have been reviewed.  Recent Lab Results  (Last 5 results in the past 24 hours)        08/30/23  1128   08/30/23  0347   08/30/23  0250   08/30/23  0249   08/29/23  2108        Procalcitonin     0.21  Comment: A concentration < 0.25 ng/mL represents a low risk of bacterial   infection.  Procalcitonin may not be accurate among patients with localized   infection, recent trauma or  major surgery, immunosuppressed state,   invasive fungal infection, renal dysfunction. Decisions regarding   initiation or continuation of antibiotic therapy should not be based   solely on procalcitonin levels.             Albumin     2.6           Alkaline Phosphatase     72           ALT     21           Anion Gap     9           Ascending aorta 3.36               Ao peak ariela 1.03               Ao VTI 15.50               Appearance, UA               AST     34           AV valve area 4.91               MOISE by Velocity Ratio 3.99               AV mean gradient 3               AV index (prosthetic) 1.12               AV peak gradient 4               AV Velocity Ratio 0.91               Bacteria, UA               Baso #       0.01         Basophil %       0.1         Bilirubin (UA)               BILIRUBIN TOTAL     1.1  Comment: For infants and newborns, interpretation of results should be based  on gestational age, weight and in agreement with clinical  observations.    Premature Infant recommended reference ranges:  Up to 24 hours.............<8.0 mg/dL  Up to 48 hours............<12.0 mg/dL  3-5 days..................<15.0 mg/dL  6-29 days.................<15.0 mg/dL             Blood Culture, Routine         No Growth to date  [P]       BSA 1.88               BUN     26           Calcium     9.2           Chloride     104           CO2     24           Color, UA               Creatinine     0.8           Left Ventricle Relative Wall Thickness 0.38               Differential Method       Automated         E/A ratio 0.80               E/E' ratio 9.38               eGFR     >60           EF 55               Eos #       0.0         Eosinophil %       0.0         Estimated Avg Glucose     103           E wave deceleration time 261.82               Folate       8.3         FS 27               Glucose     151           Glucose, UA               Gran # (ANC)       9.8         Gran %       86.1         Hematocrit        30.0         Hemoglobin       9.5         Hemoglobin A1C External     5.2  Comment: ADA Screening Guidelines:  5.7-6.4%  Consistent with prediabetes  >or=6.5%  Consistent with diabetes    High levels of fetal hemoglobin interfere with the HbA1C  assay. Heterozygous hemoglobin variants (HbS, HgC, etc)do  not significantly interfere with this assay.   However, presence of multiple variants may affect accuracy.             Hyaline Casts, UA               Immature Grans (Abs)       0.09  Comment: Mild elevation in immature granulocytes is non specific and   can be seen in a variety of conditions including stress response,   acute inflammation, trauma and pregnancy. Correlation with other   laboratory and clinical findings is essential.           Immature Granulocytes       0.8         IVRT 145.58               IVSd 0.70               Ketones, UA               LA WIDTH 4.7               Left Atrium Major Axis 4.61               Left Atrium Minor Axis 4.78               LA size 3.76               LA volume 70.50                52.70               LA vol index 36.7               LA Volume Index (Mod) 27.4               LVOT area 4.4               Leukocytes, UA               Lipase     10           LV LATERAL E/E' RATIO 7.63               LV SEPTAL E/E' RATIO 12.20               LV EDV BP 73.15               LV Diastolic Volume Index 38.10               LVIDd 4.07               LVIDs 2.98               LV mass 85.97               LV Mass Index 45               Left Ventricular Outflow Tract Mean Gradient 2.14               Left Ventricular Outflow Tract Mean Velocity 0.71               LVOT diameter 2.36               LVOT peak ariela 0.94               LVOT stroke volume 76.08               LVOT peak VTI 17.40               LV ESV BP 34.42               LV Systolic Volume Index 17.9               Lymph #       0.5         Lymph %       4.1         Magnesium     2.0           MCH       32.9         MCHC       31.7         MCV        104         Mean e' 0.07               Microscopic Comment               Mono #       1.0         Mono %       8.9         MPV       10.9         MV valve area p 1/2 method 2.90               MV valve area by continuity eq 3.98               MV mean gradient 2               MV peak gradient 5               MV Peak A Andrea 0.76               MV Peak E Andrea 0.61               MV stenosis pressure 1/2 time 75.93               MV VTI 19.1               NITRITE UA               nRBC       0         Occult Blood UA               Rogers's Biplane MOD Ejection Fraction 59               pH, UA               Phosphorus     2.1           Platelets       230         Potassium     3.7           PROTEIN TOTAL     6.1           Protein, UA               PV Peak D Andrea 0.17               PV Peak S Andrea 0.30               Pulm vein S/D ratio 1.76               Posterior Wall 0.77               RA Major Axis 5.10               RA Width 3.61               RBC       2.89         RBC, UA               RDW       17.8         RV S' 9.65               RVDD 2.64               SARS-CoV-2 RNA, Amplification, Qual   Positive  Comment: This test utilizes isothermal nucleic acid amplification technology   to   detect the SARS-CoV-2 RdRp nucleic acid segment. The analytical   sensitivity   (limit of detection) is 500 copies/swab.     A POSITIVE result is indicative of the presence of SARS-CoV-2 RNA;   clinical   correlation with patient history and other diagnostic information is   necessary to determine patient infection status.    A NEGATIVE result means that SARS-CoV-2 nucleic acids are not present   above   the limit of detection. A NEGATIVE result should be treated as   presumptive.   It does not rule out the possibility of COVID-19 and should not be   the sole   basis for treatment decisions. If COVID-19 is strongly suspected   based on   clinical and exposure history, re-testing using an alternate   molecular assay   should be  considered.     This test is only for use under the Food and Drug Administration s   Emergency   Use Authorization (EUA).     Commercial kits are provided by [employer]. Performance   characteristics of the EUA have been independently verified by   [facility] Department of Pathology and Laboratory Medicine.   _________________________________________________________________   The authorized Fact Sheet for Healthcare Providers and the authorized   Fact   Sheet for Patients of the ID NOW COVID-19 are available on the FDA   website:   https://www.fda.gov/media/690814/download   https://www.fda.gov/media/791685/download               Sinus 4.3               Sodium     137           Specific [place]da, UA               Specimen UA               STJ 4.0               TAPSE 1.62               TDI SEPTAL 0.05               TDI LATERAL 0.08               Triscuspid Valve Regurgitation Peak Gradient 25               TR Max Andrea 2.52               TSH     0.884           UROBILINOGEN UA               Vitamin B-12       541         WBC, UA               WBC       11.41         ZLVIDD -2.86               ZLVIDS -0.89                                       [P] - Preliminary Result               Significant Imaging: I have reviewed all pertinent imaging results/findings within the past 24 hours.      Assessment/Plan:      * Aspiration pneumonitis  Reported vomiting with possible aspiration at nursing home (no reported home oxy use)  Patient has emesis on chin, he does not recall event  Oxy requirement: SpO2 94% on 5lpm  Peripheral cyanosis on arrival  Rhonchorous bilat  Cough in recent days, afebrile (possible pneumonia)  CXR: Airspace opacities in the right upper lung zone and left lung base. No radiopaque foreign body within the aerodigestive tract.  Lactate 2.1, WBC wnl  SIRS+ for tachycardia, tachypnea, possible lung infectious source    PLAN:  1g Rocephin qd  Procal  COVID   Wean oxy as tolerated        COVID  Patient is identified as Severe COVID-19 based on hypoxemia with O2 saturations <94% on room air or on ambulation   Initiate standard COVID protocols; COVID-19 testing ,Infection Control notification  and isolation- respiratory, contact and droplet per protocol    Diagnostics: CBC, CMP, Procalcitonin, Ferritin, CRP, Blood Culture x2 and Portable CXR    Management: Maintain oxygen saturations 92-96% via Nasal Cannula 3 LPM and monitor with continuous/intermittent pulse oximetry. , Inhaled bronchodilators as needed for shortness of breath. and Manage respiratory failure (O2 requirement >10LPM or needing NIPPV/Mechanical ventilation) and/or Pneumonia (active chest infiltrates) separately as described below.    Advance Care Planning  Current advance care plan has not been discussed with patient/family/POA and patient currently wishes Full Code.     -Currently on Remdesivir 100mg infusion and dexamethasone 6mg qDay PO.  -Droplet and airborne precautions  -F/u with family to determine baseline status and goals of care    Vomiting  Vomiting (unclear how many times) non-bloody emesis in container in ER      PLAN:  Zofran prn  Mx LR  NPO pending Speech eval    Sinus tachycardia  Sinus tach 110 to 130  Denies CP, Bp stable  EKG w/wide complexes, partial RBBB (similar to previous),  no sign of ischemia  Echo: no dysfunction, EF 55% 8/30/23    PLAN:  Mx LR  Infectious workup and treatment per aspiration pneumonitis        Anticoagulant long-term use  Patient unaware of reason for eliquis... CVA? PVD? Vericose veins, TIA/CVA listed in chart  This admit: 3+ blood on UA, >100 RBC on microscopy, nurse states cath not especially traumatic  Hgb 9.4,     PLAN:  Monitor Hbg  Continue 5mg eliquis      Type 2 diabetes mellitus, controlled  Patient's FSGs are controlled on current medication regimen.  Last A1c reviewed-   Lab Results   Component Value Date    HGBA1C 5.9 (H) 09/09/2022     Most recent fingerstick  "glucose reviewed- No results for input(s): "POCTGLUCOSE" in the last 24 hours.  Current correctional scale  diet  Maintain anti-hyperglycemic dose as follows-   Antihyperglycemics (From admission, onward)    Start     Stop Route Frequency Ordered    08/29/23 2359  insulin aspart U-100 pen 1-10 Units         -- SubQ Before meals & nightly PRN 08/29/23 2305        Hold Oral hypoglycemics while patient is in the hospital. Diet controlled... Currently NPO    PLAN:  Monitor    Essential hypertension  Bp stable to soft     PLAN:  Continue 2.5mg amlo, hold lisinopril         VTE Risk Mitigation (From admission, onward)         Ordered     apixaban tablet 5 mg  2 times daily         08/29/23 2305     Reason for No Pharmacological VTE Prophylaxis  Once        Question:  Reasons:  Answer:  Already adequately anticoagulated on oral Anticoagulants    08/29/23 2305     IP VTE HIGH RISK PATIENT  Once         08/29/23 2305     Place sequential compression device  Until discontinued         08/29/23 2305                Discharge Planning   JESSI:      Code Status: Full Code   Is the patient medically ready for discharge?:     Reason for patient still in hospital (select all that apply): Patient trending condition and Treatment  Discharge Plan A: Return to nursing home                  Maurice Jackson MD  Department of Hospital Medicine   [facility]    "

## 2023-08-30 NOTE — ASSESSMENT & PLAN NOTE
"Patient's FSGs are controlled on current medication regimen.  Last A1c reviewed-   Lab Results   Component Value Date    HGBA1C 5.9 (H) 09/09/2022     Most recent fingerstick glucose reviewed- No results for input(s): "POCTGLUCOSE" in the last 24 hours.  Current correctional scale  diet  Maintain anti-hyperglycemic dose as follows-   Antihyperglycemics (From admission, onward)    Start     Stop Route Frequency Ordered    08/29/23 2359  insulin aspart U-100 pen 1-10 Units         -- SubQ Before meals & nightly PRN 08/29/23 2305        Hold Oral hypoglycemics while patient is in the hospital. Diet controlled... Currently NPO    PLAN:  Monitor  "

## 2023-08-30 NOTE — ASSESSMENT & PLAN NOTE
Patient is identified as Severe COVID-19 based on hypoxemia with O2 saturations <94% on room air or on ambulation   Initiate standard COVID protocols; COVID-19 testing ,Infection Control notification  and isolation- respiratory, contact and droplet per protocol    Diagnostics: CBC, CMP, Procalcitonin, Ferritin, CRP, Blood Culture x2 and Portable CXR    Management: Maintain oxygen saturations 92-96% via Nasal Cannula 3 LPM and monitor with continuous/intermittent pulse oximetry. , Inhaled bronchodilators as needed for shortness of breath. and Manage respiratory failure (O2 requirement >10LPM or needing NIPPV/Mechanical ventilation) and/or Pneumonia (active chest infiltrates) separately as described below.    Advance Care Planning  Current advance care plan has not been discussed with patient/family/POA and patient currently wishes Full Code.      -Currently on Remdesivir 100mg infusion and dexamethasone 6mg qDay PO.  -Droplet and airborne precautions  -F/u with family to determine baseline status and goals of care

## 2023-08-30 NOTE — ASSESSMENT & PLAN NOTE
Sinus tach 110 to 130  Denies CP, Bp stable  EKG w/wide complexes, partial RBBB (similar to previous),  no sign of ischemia  Echo: no dysfunction, EF 55% 8/30/23    PLAN:  Mx LR  Infectious workup and treatment per aspiration pneumonitis

## 2023-08-30 NOTE — PLAN OF CARE
Problem: Swallowing Impairment  Goal: Optimal Eating and Swallowing Without Aspiration  Outcome: Met     Problem: Fall Injury Risk  Goal: Absence of Fall and Fall-Related Injury  Outcome: Ongoing, Progressing     Problem: Skin Injury Risk Increased  Goal: Skin Health and Integrity  Outcome: Ongoing, Progressing     Problem: Infection  Goal: Absence of Infection Signs and Symptoms  Outcome: Ongoing, Progressing

## 2023-08-30 NOTE — H&P
[facility] Emergency [facility] Medicine  History & Physical    Patient Name: Axel Burgos  MRN: [id number]  Patient Class: IP- Inpatient  Admission Date: 2023  Attending Physician: Saji Major III, MD   Primary Care Provider: Ahsan Marinelli MD         Patient information was obtained from patient and ER records.     Subjective:     Principal Problem:Aspiration pneumonitis    Chief Complaint:   Chief Complaint   Patient presents with    possible aspriation      [facility] with c/o fever, and not acting right per nursing staff. EMS states gurgling sounds heard upon exhalation of pt.         HPI: 77-year-old male with past medical history of diabetes, hypertension, no home oxy use. He presents from nursing facility with concern for worsening productive cough and shortness of breath for 2-3 days, afebrile and mild AMS, according to nursing home nurses. The patient vomited this evening and possibly aspirated. He was brought by EMS hypoxic to 80s with peripheral cyanosis.   The patient has no recollection of the evening's events, but is currently A&Ox3 with no complaints. He is unaware of taking daily medicines or any other medical history. Nonsmoker, denies etoh in recent years. States he frequently ambulates w/wheelchair.    In Emergency, 110/70, 118 HR, RR 30, 98.5F, SpO2 94% on 4lpm. Labs were significant for WBC 9.9, Hgb 9.4 (baseline 10.5), , Na+ 137, K+ 3.7,  Glu 154, BUN/Cr 27/0.8 (baseline wnl) VB.39/48/V/29.9. UA 3+ blood, >100 RBC on mircorscopy. CXR showed Airspace opacities in the right upper lung zone and left lung base. No radiopaque foreign body within the aerodigestive tract. POCUS: Many A-lines, minimal B-lines, adequate cardiac wall motion. EKG showed sinus tach w/wide complexes, partial RBBB (similar to previous),  no sign of ischemia. He was put on 5lpm oxy, given 1g ceftriaxone and admitted to [facility] for management of aspiration  pneumonitis and hypoxia.      Past Medical History:   Diagnosis Date    Anticoagulant long-term use     Diabetes mellitus     Hypertension     Stroke     Varicose veins        Past Surgical History:   Procedure Laterality Date    VARICOSE VEIN SURGERY         Review of patient's allergies indicates:  No Known Allergies    No current facility-administered medications on file prior to encounter.     Current Outpatient Medications on File Prior to Encounter   Medication Sig    amLODIPine (NORVASC) 2.5 MG tablet Take 2.5 mg by mouth once daily.    apixaban (ELIQUIS) 5 mg Tab Take 5 mg by mouth 2 (two) times daily.    ascorbic acid, vitamin C, (VITAMIN C) 500 MG tablet Take 500 mg by mouth once daily.    aspirin (ECOTRIN) 81 MG EC tablet Take 81 mg by mouth once daily.    atorvastatin (LIPITOR) 20 MG tablet Take 20 mg by mouth once daily.    dulaglutide (TRULICITY) 0.75 mg/0.5 mL pen injector Inject 0.75 mg into the skin every 7 days.    lisinopriL (PRINIVIL,ZESTRIL) 5 MG tablet Take 5 mg by mouth once daily.    menthol-zinc oxide (CALMOSEPTINE) 0.44-20.6 % Oint Apply topically as needed (redness/irritation). Apply to buttocks/groin    povidone-iodine (BETADINE) 10 % external solution Apply topically 3 (three) times a week. Paint scabs/wounds to left foot and allow to air dry    vitamin D (VITAMIN D3) 1000 units Tab Take 1,000 Units by mouth once daily.     Family History    None       Tobacco Use    Smoking status: Never    Smokeless tobacco: Never   Substance and Sexual Activity    Alcohol use: No    Drug use: No    Sexual activity: Never     Review of Systems   Constitutional:  Positive for activity change and fatigue.   Respiratory:  Positive for cough. Negative for chest tightness and shortness of breath.    Cardiovascular:  Negative for chest pain, palpitations and leg swelling.   Gastrointestinal:  Positive for nausea and vomiting. Negative for diarrhea.   Genitourinary:  Negative for  difficulty urinating and dysuria.   Musculoskeletal:  Negative for arthralgias and myalgias.   Neurological:  Negative for dizziness and headaches.     Objective:     Vital Signs (Most Recent):  Temp: 98.2 °F (36.8 °C) (08/29/23 2201)  Pulse: (!) 112 (08/30/23 0002)  Resp: (!) 26 (08/30/23 0002)  BP: (!) 117/56 (08/30/23 0002)  SpO2: 98 % (08/30/23 0002) Vital Signs (24h Range):  Temp:  [98.2 °F (36.8 °C)-98.5 °F (36.9 °C)] 98.2 °F (36.8 °C)  Pulse:  [110-118] 112  Resp:  [22-30] 26  SpO2:  [93 %-98 %] 98 %  BP: (110-125)/(56-70) 117/56     Weight: 80.7 kg (178 lb)  Body mass index is 22.85 kg/m².     Physical Exam  Constitutional:       Appearance: Normal appearance.   HENT:      Head: Normocephalic and atraumatic.      Mouth/Throat:      Mouth: Mucous membranes are dry.   Eyes:      Extraocular Movements: Extraocular movements intact.      Pupils: Pupils are equal, round, and reactive to light.   Cardiovascular:      Rate and Rhythm: Normal rate and regular rhythm.      Pulses: Normal pulses.      Heart sounds: Normal heart sounds. No murmur heard.  Pulmonary:      Effort: Pulmonary effort is normal.      Breath sounds: Rhonchi present. No wheezing.   Abdominal:      General: Abdomen is flat.      Palpations: Abdomen is soft.      Tenderness: There is no abdominal tenderness. There is no right CVA tenderness, left CVA tenderness, guarding or rebound.   Musculoskeletal:      Cervical back: Normal range of motion and neck supple. No tenderness.      Right lower leg: No edema.      Left lower leg: No edema.   Neurological:      Mental Status: He is alert and oriented to person, place, and time.      Cranial Nerves: No cranial nerve deficit.      Sensory: No sensory deficit.      Motor: Weakness present.      Comments: A&Ox3, but does not recall why he is in hospital    Strength 5/5 upper extremities, 3/5 lower extremities              CRANIAL NERVES     CN III, IV, VI   Pupils are equal, round, and reactive to  light.       Significant Labs: All pertinent labs within the past 24 hours have been reviewed.  CBC:   Recent Labs   Lab 08/29/23 2013   WBC 9.95   HGB 9.4*   HCT 29.1*        CMP:   Recent Labs   Lab 08/29/23 2013      K 3.7      CO2 24   *   BUN 27*   CREATININE 0.8   CALCIUM 9.3   PROT 6.1   ALBUMIN 2.6*   BILITOT 1.2*   ALKPHOS 75   AST 36   ALT 21   ANIONGAP 9       Significant Imaging: I have reviewed all pertinent imaging results/findings within the past 24 hours.    Assessment/Plan:     * Aspiration pneumonitis  Reported vomiting with possible aspiration at nursing home (no reported home oxy use)  Patient has emesis on chin, he does not recall event  Oxy requirement: SpO2 94% on 5lpm  Peripheral cyanosis on arrival  Rhonchorous bilat  Cough in recent days, afebrile (possible pneumonia)  CXR: Airspace opacities in the right upper lung zone and left lung base. No radiopaque foreign body within the aerodigestive tract.  Lactate 2.1, WBC wnl  SIRS+ for tachycardia, tachypnea, possible lung infectious source    PLAN:  1g Rocephin qd  Procal  COVID   Wean oxy as tolerated       Sinus tachycardia  Sinus tach 110 to 130  Denies CP, Bp stable  EKG w/wide complexes, partial RBBB (similar to previous),  no sign of ischemia    PLAN:  Mx LR  Infectious workup and treatment per aspiration pneumonitis  Echo      Vomiting  Vomiting (unclear how many times) non-bloody emesis in container in ER      PLAN:  Zofran prn  Mx LR  NPO pending Speech eval    Anticoagulant long-term use  Patient unaware of reason for eliquis... CVA? PVD? Vericose veins, TIA/CVA listed in chart  This admit: 3+ blood on UA, >100 RBC on microscopy, nurse states cath not especially traumatic  Hgb 9.4,     PLAN:  Monitor Hbg  Continue 5mg eliquis      Type 2 diabetes mellitus, controlled  Patient's FSGs are controlled on current medication regimen.  Last A1c reviewed-   Lab Results   Component Value Date    HGBA1C 5.9  "(H) 09/09/2022     Most recent fingerstick glucose reviewed- No results for input(s): "POCTGLUCOSE" in the last 24 hours.  Current correctional scale  diet  Maintain anti-hyperglycemic dose as follows-   Antihyperglycemics (From admission, onward)    Start     Stop Route Frequency Ordered    08/29/23 2359  insulin aspart U-100 pen 1-10 Units         -- SubQ Before meals & nightly PRN 08/29/23 2305        Hold Oral hypoglycemics while patient is in the hospital. Diet controlled... Currently NPO    PLAN:  Monitor    Essential hypertension  Bp stable to soft     PLAN:  Continue 2.5mg amlo, hold lisinopril         VTE Risk Mitigation (From admission, onward)         Ordered     apixaban tablet 5 mg  2 times daily         08/29/23 2305     Reason for No Pharmacological VTE Prophylaxis  Once        Question:  Reasons:  Answer:  Already adequately anticoagulated on oral Anticoagulants    08/29/23 2305     IP VTE HIGH RISK PATIENT  Once         08/29/23 2305     Place sequential compression device  Until discontinued         08/29/23 2305                           Jose M Fleming MD  Department of Hospital Medicine  [facility] - Emergency Dept  "

## 2023-08-30 NOTE — PT/OT/SLP EVAL
Speech Language Pathology Evaluation  Bedside Swallow    Patient Name:  Axel Burgos   MRN:  [id number]  Admitting Diagnosis: Aspiration pneumonitis    Recommendations:                 General Recommendations:  Dysphagia therapy  Diet recommendations:  Puree Diet - IDDSI Level 4, Thin liquids - IDDSI Level 0   Aspiration Precautions: 1 bite/sip at a time, Assistance with meals, Frequent oral care, HOB to 90 degrees, Meds crushed in puree, and Small bites/sips   General Precautions: Standard,    Communication strategies:  provide increased time to answer    Assessment:     Axel Burgos is a 78 y.o. male with a dx of Aspiration pneumonitis, who presents with mod oral dysphagia, impacted by scattered dentition. ST recs pureed diet with thin liquids. Meds crushed in puree. ST following.     History:     Past Medical History:   Diagnosis Date    Anticoagulant long-term use     Diabetes mellitus     Hypertension     Stroke     Varicose veins        Past Surgical History:   Procedure Laterality Date    VARICOSE VEIN SURGERY         [facility] History[facility]     Prior Intubation HX:  None    Modified Barium Swallow: None per EMR    Chest X-Rays:   8/29/23    Impression:     Airspace opacities in the right upper lung zone and left lung base.     No radiopaque foreign body within the aerodigestive tract.    Prior diet: Unknown 2/2 pt poor historian.    Subjective     ST obtained clearance from pt's nurse for b/s swallow evaluation prior to entrance. Pt was sleep, lying in bed upon ST's entrance. Pt required mod verbal and tactile cues to arouse, however, was able to sustain alertness for entirety of eval. Pt unable to recall any events that led to admit, with generalized confusion noted. Unknown baseline cognition at this time.    Patient goals: Resume po intake      Pain/Comfort:  Pain Rating 1: 0/10    Respiratory Status: Room air    Objective:     Oral Musculature Evaluation  Oral Musculature: general  weakness  Dentition: scattered dentition, teeth in poor condition  Secretion Management: coughing on secretions  Mucosal Quality: good  Oral Labial Strength and Mobility: impaired retraction, impaired pursing, functional seal  Lingual Strength and Mobility: WFL  Volitional Cough: Weak  Volitional Swallow: Delayed  Voice Prior to PO Intake: Dcr intensity    Bedside Swallow Eval:   Consistencies Assessed:  Thin liquids -x5 straw sips of water  Puree -x4 tsp bites of pudding  Solids -x1 small bite of leonides cracker       Oral Phase:   Prolonged mastication  Lingual residue    Pharyngeal Phase:   decreased hyolaryngeal excursion to palpation  delayed swallow initation  multiple spontaneous swallows    Compensatory Strategies  None    Treatment: It should be noted that silent aspiration cannot be r/o via b/s assessment. Pt will requiring on-going education regarding diet recs 2/2 current confusion.    Pt's nurse and MD notified of diet recs.     Goals:   Multidisciplinary Problems       SLP Goals          Problem: SLP    Goal Priority Disciplines Outcome   SLP Goal    Low SLP Ongoing, Progressing   Description: Goals:  1. Pt will tolerate a pureed diet with thin liquids w/o any overt s/s of aspiration.                          Plan:     Patient to be seen:  3 x/week   Plan of Care expires:     Plan of Care reviewed with:  patient   SLP Follow-Up:  Yes       Discharge recommendations:      Barriers to Discharge:  None    Time Tracking:     SLP Treatment Date:   08/30/23  Speech Start Time:  0928  Speech Stop Time:  0951     Speech Total Time (min):  23 min    Billable Minutes: Treatment Swallowing Dysfunction 10 and Eval Swallow and Oral Function 13    08/30/2023

## 2023-08-30 NOTE — SUBJECTIVE & OBJECTIVE
Interval History: Patient is sleeping soundly and, it appears, comfortably. Difficult to rouse, but will wake briefly to answer question or follow command and then falls back asleep.    Review of Systems   Reason unable to perform ROS: Patient does not respond because sleeping.     Objective:     Vital Signs (Most Recent):  Temp: 96.6 °F (35.9 °C) (08/30/23 1653)  Pulse: 79 (08/30/23 1653)  Resp: 20 (08/30/23 1653)  BP: (!) 91/53 (08/30/23 1653)  SpO2: 97 % (08/30/23 1653) Vital Signs (24h Range):  Temp:  [96.6 °F (35.9 °C)-98.7 °F (37.1 °C)] 96.6 °F (35.9 °C)  Pulse:  [] 79  Resp:  [18-30] 20  SpO2:  [92 %-98 %] 97 %  BP: ()/(53-73) 91/53     Weight: 67.6 kg (149 lb)  Body mass index is 19.13 kg/m².    Intake/Output Summary (Last 24 hours) at 8/30/2023 1835  Last data filed at 8/30/2023 1735  Gross per 24 hour   Intake 250 ml   Output --   Net 250 ml         Physical Exam  Vitals and nursing note reviewed.   Constitutional:       General: He is sleeping. He is not in acute distress.     Appearance: He is not ill-appearing or toxic-appearing.      Interventions: He is not intubated.Nasal cannula in place.   HENT:      Head: Normocephalic.   Cardiovascular:      Rate and Rhythm: Normal rate and regular rhythm.      Heart sounds: Normal heart sounds.   Pulmonary:      Effort: Pulmonary effort is normal. No accessory muscle usage, prolonged expiration or respiratory distress. He is not intubated.      Breath sounds: Normal breath sounds and air entry. Transmitted upper airway sounds (snoring) present.   Abdominal:      General: Bowel sounds are normal.      Palpations: Abdomen is soft.      Tenderness: There is no abdominal tenderness.   Neurological:      Mental Status: He is lethargic.             Significant Labs: All pertinent labs within the past 24 hours have been reviewed.  Recent Lab Results  (Last 5 results in the past 24 hours)        08/30/23  1128   08/30/23  0347   08/30/23  0250    08/30/23  0249   08/29/23  2108        Procalcitonin     0.21  Comment: A concentration < 0.25 ng/mL represents a low risk of bacterial   infection.  Procalcitonin may not be accurate among patients with localized   infection, recent trauma or major surgery, immunosuppressed state,   invasive fungal infection, renal dysfunction. Decisions regarding   initiation or continuation of antibiotic therapy should not be based   solely on procalcitonin levels.             Albumin     2.6           Alkaline Phosphatase     72           ALT     21           Anion Gap     9           Ascending aorta 3.36               Ao peak ariela 1.03               Ao VTI 15.50               Appearance, UA               AST     34           AV valve area 4.91               MOISE by Velocity Ratio 3.99               AV mean gradient 3               AV index (prosthetic) 1.12               AV peak gradient 4               AV Velocity Ratio 0.91               Bacteria, UA               Baso #       0.01         Basophil %       0.1         Bilirubin (UA)               BILIRUBIN TOTAL     1.1  Comment: For infants and newborns, interpretation of results should be based  on gestational age, weight and in agreement with clinical  observations.    Premature Infant recommended reference ranges:  Up to 24 hours.............<8.0 mg/dL  Up to 48 hours............<12.0 mg/dL  3-5 days..................<15.0 mg/dL  6-29 days.................<15.0 mg/dL             Blood Culture, Routine         No Growth to date  [P]       BSA 1.88               BUN     26           Calcium     9.2           Chloride     104           CO2     24           Color, UA               Creatinine     0.8           Left Ventricle Relative Wall Thickness 0.38               Differential Method       Automated         E/A ratio 0.80               E/E' ratio 9.38               eGFR     >60           EF 55               Eos #       0.0         Eosinophil %       0.0         Estimated Avg  Glucose     103           E wave deceleration time 261.82               Folate       8.3         FS 27               Glucose     151           Glucose, UA               Gran # (ANC)       9.8         Gran %       86.1         Hematocrit       30.0         Hemoglobin       9.5         Hemoglobin A1C External     5.2  Comment: ADA Screening Guidelines:  5.7-6.4%  Consistent with prediabetes  >or=6.5%  Consistent with diabetes    High levels of fetal hemoglobin interfere with the HbA1C  assay. Heterozygous hemoglobin variants (HbS, HgC, etc)do  not significantly interfere with this assay.   However, presence of multiple variants may affect accuracy.             Hyaline Casts, UA               Immature Grans (Abs)       0.09  Comment: Mild elevation in immature granulocytes is non specific and   can be seen in a variety of conditions including stress response,   acute inflammation, trauma and pregnancy. Correlation with other   laboratory and clinical findings is essential.           Immature Granulocytes       0.8         IVRT 145.58               IVSd 0.70               Ketones, UA               LA WIDTH 4.7               Left Atrium Major Axis 4.61               Left Atrium Minor Axis 4.78               LA size 3.76               LA volume 70.50                52.70               LA vol index 36.7               LA Volume Index (Mod) 27.4               LVOT area 4.4               Leukocytes, UA               Lipase     10           LV LATERAL E/E' RATIO 7.63               LV SEPTAL E/E' RATIO 12.20               LV EDV BP 73.15               LV Diastolic Volume Index 38.10               LVIDd 4.07               LVIDs 2.98               LV mass 85.97               LV Mass Index 45               Left Ventricular Outflow Tract Mean Gradient 2.14               Left Ventricular Outflow Tract Mean Velocity 0.71               LVOT diameter 2.36               LVOT peak ariela 0.94               LVOT stroke volume 76.08                LVOT peak VTI 17.40               LV ESV BP 34.42               LV Systolic Volume Index 17.9               Lymph #       0.5         Lymph %       4.1         Magnesium     2.0           MCH       32.9         MCHC       31.7         MCV       104         Mean e' 0.07               Microscopic Comment               Mono #       1.0         Mono %       8.9         MPV       10.9         MV valve area p 1/2 method 2.90               MV valve area by continuity eq 3.98               MV mean gradient 2               MV peak gradient 5               MV Peak A Andrea 0.76               MV Peak E Andrea 0.61               MV stenosis pressure 1/2 time 75.93               MV VTI 19.1               NITRITE UA               nRBC       0         Occult Blood UA               Rogers's Biplane MOD Ejection Fraction 59               pH, UA               Phosphorus     2.1           Platelets       230         Potassium     3.7           PROTEIN TOTAL     6.1           Protein, UA               PV Peak D Andrea 0.17               PV Peak S Andrea 0.30               Pulm vein S/D ratio 1.76               Posterior Wall 0.77               RA Major Axis 5.10               RA Width 3.61               RBC       2.89         RBC, UA               RDW       17.8         RV S' 9.65               RVDD 2.64               SARS-CoV-2 RNA, Amplification, Qual   Positive  Comment: This test utilizes isothermal nucleic acid amplification technology   to   detect the SARS-CoV-2 RdRp nucleic acid segment. The analytical   sensitivity   (limit of detection) is 500 copies/swab.     A POSITIVE result is indicative of the presence of SARS-CoV-2 RNA;   clinical   correlation with patient history and other diagnostic information is   necessary to determine patient infection status.    A NEGATIVE result means that SARS-CoV-2 nucleic acids are not present   above   the limit of detection. A NEGATIVE result should be treated as   presumptive.   It does not rule  out the possibility of COVID-19 and should not be   the sole   basis for treatment decisions. If COVID-19 is strongly suspected   based on   clinical and exposure history, re-testing using an alternate   molecular assay   should be considered.     This test is only for use under the Food and Drug Administration s   Emergency   Use Authorization (EUA).     Commercial kits are provided by [employer]. Performance   characteristics of the EUA have been independently verified by   [facility] Department of Pathology and Laboratory Medicine.   _________________________________________________________________   The authorized Fact Sheet for Healthcare Providers and the authorized   Fact   Sheet for Patients of the ID NOW COVID-19 are available on the FDA   website:   https://www.fda.gov/media/685964/download   https://www.fda.gov/media/755876/download               Sinus 4.3               Sodium     137           Specific Butler, UA               Specimen UA               STJ 4.0               TAPSE 1.62               TDI SEPTAL 0.05               TDI LATERAL 0.08               Triscuspid Valve Regurgitation Peak Gradient 25               TR Max Andrea 2.52               TSH     0.884           UROBILINOGEN UA               Vitamin B-12       541         WBC, UA               WBC       11.41         ZLVIDD -2.86               ZLVIDS -0.89                                       [P] - Preliminary Result               Significant Imaging: I have reviewed all pertinent imaging results/findings within the past 24 hours.

## 2023-08-30 NOTE — PROGRESS NOTES
08/30/23 0628   Admission   Initial VN Admission Questions Incomplete   Shift   Virtual Nurse - Rounding Complete   Virtual Nurse - Patient Verbalized Approval Of Camera Use;VN Rounding   Type of Frequent Check   Type Patient Rounds   Safety/Activity   Patient Rounds bed in low position;bed wheels locked;call light in patient/parent reach;clutter free environment maintained;ID band on;visualized patient;placement of personal items at bedside   Safety Promotion/Fall Prevention bed alarm set;side rails raised x 2   Activity Assistance Provided assistance, 2 people   Positioning   Body Position supine   Head of Bed (HOB) Positioning HOB elevated     Patient poor historian, history of dementia.  Admission questions completed via [employer] paperwork and electronic medical record.

## 2023-08-30 NOTE — SUBJECTIVE & OBJECTIVE
Past Medical History:   Diagnosis Date    Anticoagulant long-term use     Diabetes mellitus     Hypertension     Stroke     Varicose veins        Past Surgical History:   Procedure Laterality Date    VARICOSE VEIN SURGERY         Review of patient's allergies indicates:  No Known Allergies    No current facility-administered medications on file prior to encounter.     Current Outpatient Medications on File Prior to Encounter   Medication Sig    amLODIPine (NORVASC) 2.5 MG tablet Take 2.5 mg by mouth once daily.    apixaban (ELIQUIS) 5 mg Tab Take 5 mg by mouth 2 (two) times daily.    ascorbic acid, vitamin C, (VITAMIN C) 500 MG tablet Take 500 mg by mouth once daily.    aspirin (ECOTRIN) 81 MG EC tablet Take 81 mg by mouth once daily.    atorvastatin (LIPITOR) 20 MG tablet Take 20 mg by mouth once daily.    dulaglutide (TRULICITY) 0.75 mg/0.5 mL pen injector Inject 0.75 mg into the skin every 7 days.    lisinopriL (PRINIVIL,ZESTRIL) 5 MG tablet Take 5 mg by mouth once daily.    menthol-zinc oxide (CALMOSEPTINE) 0.44-20.6 % Oint Apply topically as needed (redness/irritation). Apply to buttocks/groin    povidone-iodine (BETADINE) 10 % external solution Apply topically 3 (three) times a week. Paint scabs/wounds to left foot and allow to air dry    vitamin D (VITAMIN D3) 1000 units Tab Take 1,000 Units by mouth once daily.     Family History    None       Tobacco Use    Smoking status: Never    Smokeless tobacco: Never   Substance and Sexual Activity    Alcohol use: No    Drug use: No    Sexual activity: Never     Review of Systems   Constitutional:  Positive for activity change and fatigue.   Respiratory:  Positive for cough. Negative for chest tightness and shortness of breath.    Cardiovascular:  Negative for chest pain, palpitations and leg swelling.   Gastrointestinal:  Positive for nausea and vomiting. Negative for diarrhea.   Genitourinary:  Negative for difficulty urinating and dysuria.   Musculoskeletal:   Negative for arthralgias and myalgias.   Neurological:  Negative for dizziness and headaches.     Objective:     Vital Signs (Most Recent):  Temp: 98.2 °F (36.8 °C) (08/29/23 2201)  Pulse: (!) 112 (08/30/23 0002)  Resp: (!) 26 (08/30/23 0002)  BP: (!) 117/56 (08/30/23 0002)  SpO2: 98 % (08/30/23 0002) Vital Signs (24h Range):  Temp:  [98.2 °F (36.8 °C)-98.5 °F (36.9 °C)] 98.2 °F (36.8 °C)  Pulse:  [110-118] 112  Resp:  [22-30] 26  SpO2:  [93 %-98 %] 98 %  BP: (110-125)/(56-70) 117/56     Weight: 80.7 kg (178 lb)  Body mass index is 22.85 kg/m².     Physical Exam  Constitutional:       Appearance: Normal appearance.   HENT:      Head: Normocephalic and atraumatic.      Mouth/Throat:      Mouth: Mucous membranes are dry.   Eyes:      Extraocular Movements: Extraocular movements intact.      Pupils: Pupils are equal, round, and reactive to light.   Cardiovascular:      Rate and Rhythm: Normal rate and regular rhythm.      Pulses: Normal pulses.      Heart sounds: Normal heart sounds. No murmur heard.  Pulmonary:      Effort: Pulmonary effort is normal.      Breath sounds: Rhonchi present. No wheezing.   Abdominal:      General: Abdomen is flat.      Palpations: Abdomen is soft.      Tenderness: There is no abdominal tenderness. There is no right CVA tenderness, left CVA tenderness, guarding or rebound.   Musculoskeletal:      Cervical back: Normal range of motion and neck supple. No tenderness.      Right lower leg: No edema.      Left lower leg: No edema.   Neurological:      Mental Status: He is alert and oriented to person, place, and time.      Cranial Nerves: No cranial nerve deficit.      Sensory: No sensory deficit.      Motor: Weakness present.      Comments: A&Ox3, but does not recall why he is in hospital    Strength 5/5 upper extremities, 3/5 lower extremities              CRANIAL NERVES     CN III, IV, VI   Pupils are equal, round, and reactive to light.       Significant Labs: All pertinent labs within the  past 24 hours have been reviewed.  CBC:   Recent Labs   Lab 08/29/23 2013   WBC 9.95   HGB 9.4*   HCT 29.1*        CMP:   Recent Labs   Lab 08/29/23 2013      K 3.7      CO2 24   *   BUN 27*   CREATININE 0.8   CALCIUM 9.3   PROT 6.1   ALBUMIN 2.6*   BILITOT 1.2*   ALKPHOS 75   AST 36   ALT 21   ANIONGAP 9       Significant Imaging: I have reviewed all pertinent imaging results/findings within the past 24 hours.

## 2023-08-31 PROBLEM — R00.0 SINUS TACHYCARDIA: Status: RESOLVED | Noted: 2023-08-30 | Resolved: 2023-08-31

## 2023-08-31 PROBLEM — R11.10 VOMITING: Status: RESOLVED | Noted: 2023-08-30 | Resolved: 2023-08-31

## 2023-08-31 LAB
ALBUMIN SERPL BCP-MCNC: 2.1 G/DL (ref 3.5–5.2)
ALP SERPL-CCNC: 61 U/L (ref 55–135)
ALT SERPL W/O P-5'-P-CCNC: 23 U/L (ref 10–44)
ANION GAP SERPL CALC-SCNC: 9 MMOL/L (ref 8–16)
ANISOCYTOSIS BLD QL SMEAR: SLIGHT
AST SERPL-CCNC: 51 U/L (ref 10–40)
BASOPHILS # BLD AUTO: 0.02 K/UL (ref 0–0.2)
BASOPHILS NFR BLD: 0.3 % (ref 0–1.9)
BILIRUB SERPL-MCNC: 0.5 MG/DL (ref 0.1–1)
BUN SERPL-MCNC: 30 MG/DL (ref 8–23)
BURR CELLS BLD QL SMEAR: ABNORMAL
CALCIUM SERPL-MCNC: 8.8 MG/DL (ref 8.7–10.5)
CHLORIDE SERPL-SCNC: 108 MMOL/L (ref 95–110)
CO2 SERPL-SCNC: 21 MMOL/L (ref 23–29)
CREAT SERPL-MCNC: 0.8 MG/DL (ref 0.5–1.4)
DIFFERENTIAL METHOD: ABNORMAL
EOSINOPHIL # BLD AUTO: 0 K/UL (ref 0–0.5)
EOSINOPHIL NFR BLD: 0 % (ref 0–8)
ERYTHROCYTE [DISTWIDTH] IN BLOOD BY AUTOMATED COUNT: 18.2 % (ref 11.5–14.5)
EST. GFR  (NO RACE VARIABLE): >60 ML/MIN/1.73 M^2
GLUCOSE SERPL-MCNC: 145 MG/DL (ref 70–110)
HCT VFR BLD AUTO: 28.7 % (ref 40–54)
HGB BLD-MCNC: 8.7 G/DL (ref 14–18)
HYPOCHROMIA BLD QL SMEAR: ABNORMAL
IMM GRANULOCYTES # BLD AUTO: 0.06 K/UL (ref 0–0.04)
IMM GRANULOCYTES NFR BLD AUTO: 1 % (ref 0–0.5)
LYMPHOCYTES # BLD AUTO: 0.7 K/UL (ref 1–4.8)
LYMPHOCYTES NFR BLD: 11.5 % (ref 18–48)
MAGNESIUM SERPL-MCNC: 2 MG/DL (ref 1.6–2.6)
MCH RBC QN AUTO: 33.3 PG (ref 27–31)
MCHC RBC AUTO-ENTMCNC: 30.3 G/DL (ref 32–36)
MCV RBC AUTO: 110 FL (ref 82–98)
MONOCYTES # BLD AUTO: 0.5 K/UL (ref 0.3–1)
MONOCYTES NFR BLD: 8.6 % (ref 4–15)
NEUTROPHILS # BLD AUTO: 4.9 K/UL (ref 1.8–7.7)
NEUTROPHILS NFR BLD: 78.6 % (ref 38–73)
NRBC BLD-RTO: 0 /100 WBC
OVALOCYTES BLD QL SMEAR: ABNORMAL
PHOSPHATE SERPL-MCNC: 2.5 MG/DL (ref 2.7–4.5)
PLATELET # BLD AUTO: 191 K/UL (ref 150–450)
PLATELET BLD QL SMEAR: ABNORMAL
PMV BLD AUTO: 11.1 FL (ref 9.2–12.9)
POIKILOCYTOSIS BLD QL SMEAR: SLIGHT
POTASSIUM SERPL-SCNC: 5 MMOL/L (ref 3.5–5.1)
PROT SERPL-MCNC: 5.5 G/DL (ref 6–8.4)
RBC # BLD AUTO: 2.61 M/UL (ref 4.6–6.2)
SODIUM SERPL-SCNC: 138 MMOL/L (ref 136–145)
TARGETS BLD QL SMEAR: ABNORMAL
WBC # BLD AUTO: 6.17 K/UL (ref 3.9–12.7)

## 2023-08-31 PROCEDURE — 25000003 PHARM REV CODE 250: Mod: HCNC

## 2023-08-31 PROCEDURE — 85025 COMPLETE CBC W/AUTO DIFF WBC: CPT | Mod: HCNC

## 2023-08-31 PROCEDURE — 92526 ORAL FUNCTION THERAPY: CPT | Mod: HCNC

## 2023-08-31 PROCEDURE — 27000207 HC ISOLATION: Mod: HCNC

## 2023-08-31 PROCEDURE — 84100 ASSAY OF PHOSPHORUS: CPT | Mod: HCNC

## 2023-08-31 PROCEDURE — 36415 COLL VENOUS BLD VENIPUNCTURE: CPT | Mod: HCNC

## 2023-08-31 PROCEDURE — 83735 ASSAY OF MAGNESIUM: CPT | Mod: HCNC

## 2023-08-31 PROCEDURE — 99900035 HC TECH TIME PER 15 MIN (STAT): Mod: HCNC

## 2023-08-31 PROCEDURE — 80053 COMPREHEN METABOLIC PANEL: CPT | Mod: HCNC

## 2023-08-31 PROCEDURE — 63600175 PHARM REV CODE 636 W HCPCS: Mod: JZ,TB,HCNC

## 2023-08-31 PROCEDURE — 94761 N-INVAS EAR/PLS OXIMETRY MLT: CPT | Mod: HCNC

## 2023-08-31 PROCEDURE — 11000001 HC ACUTE MED/SURG PRIVATE ROOM: Mod: HCNC

## 2023-08-31 RX ORDER — AMOXICILLIN AND CLAVULANATE POTASSIUM 875; 125 MG/1; MG/1
1 TABLET, FILM COATED ORAL EVERY 12 HOURS
Qty: 4 TABLET | Refills: 0 | Status: SHIPPED | OUTPATIENT
Start: 2023-09-01 | End: 2023-09-01 | Stop reason: SDUPTHER

## 2023-08-31 RX ORDER — SODIUM,POTASSIUM PHOSPHATES 280-250MG
2 POWDER IN PACKET (EA) ORAL ONCE
Status: COMPLETED | OUTPATIENT
Start: 2023-08-31 | End: 2023-08-31

## 2023-08-31 RX ADMIN — POTASSIUM & SODIUM PHOSPHATES POWDER PACK 280-160-250 MG 2 PACKET: 280-160-250 PACK at 08:08

## 2023-08-31 RX ADMIN — DEXAMETHASONE 6 MG: 4 TABLET ORAL at 08:08

## 2023-08-31 RX ADMIN — ATORVASTATIN CALCIUM 20 MG: 20 TABLET, FILM COATED ORAL at 08:08

## 2023-08-31 RX ADMIN — AMLODIPINE BESYLATE 2.5 MG: 2.5 TABLET ORAL at 08:08

## 2023-08-31 RX ADMIN — APIXABAN 5 MG: 5 TABLET, FILM COATED ORAL at 08:08

## 2023-08-31 RX ADMIN — CHOLECALCIFEROL TAB 25 MCG (1000 UNIT) 1000 UNITS: 25 TAB at 08:08

## 2023-08-31 RX ADMIN — APIXABAN 5 MG: 5 TABLET, FILM COATED ORAL at 09:08

## 2023-08-31 RX ADMIN — MUPIROCIN: 20 OINTMENT TOPICAL at 08:08

## 2023-08-31 RX ADMIN — ASPIRIN 81 MG: 81 TABLET, COATED ORAL at 08:08

## 2023-08-31 RX ADMIN — REMDESIVIR 100 MG: 100 INJECTION, POWDER, LYOPHILIZED, FOR SOLUTION INTRAVENOUS at 08:08

## 2023-08-31 RX ADMIN — OXYCODONE HYDROCHLORIDE AND ACETAMINOPHEN 500 MG: 500 TABLET ORAL at 08:08

## 2023-08-31 RX ADMIN — CEFTRIAXONE SODIUM 1 G: 1 INJECTION, POWDER, FOR SOLUTION INTRAMUSCULAR; INTRAVENOUS at 09:08

## 2023-08-31 RX ADMIN — MUPIROCIN: 20 OINTMENT TOPICAL at 09:08

## 2023-08-31 NOTE — CARE UPDATE
[facility]    FACILITY TRANSFER ORDERS      Patient Name: Axel Burgos  YOB: 1945    PCP: Ahsan Marinelli MD   PCP Address: [address] [place] [address] / [place] [address]  PCP Phone Number: [phone]  PCP Fax: [phone]    Encounter Date: 09/01/2023    Admit to: [facility]    Vital Signs:  Routine    Diagnoses:   Active Hospital Problems    Diagnosis  POA    *Aspiration pneumonitis [J69.0]  Yes    COVID [U07.1]  Yes    Anticoagulant long-term use [Z79.01]  Not Applicable    Essential hypertension [I10]  Yes     Chronic    Type 2 diabetes mellitus, controlled [E11.9]  Yes     Chronic      Resolved Hospital Problems    Diagnosis Date Resolved POA    Sinus tachycardia [R00.0] 08/31/2023 Yes    Vomiting [R11.10] 08/31/2023 Yes       Supplemental O2: O2 at 2L per NC to keep sats above 92%    Allergies:Review of patient's allergies indicates:  No Known Allergies    Diet: pureed diet thin    Activities: Activity as tolerated    Goals of Care Treatment Preferences:  Code Status: Full Code    [facility] [phone]          What is most important right now is to focus on avoiding the hospital, symptom/pain control, quality of life, even if it means sacrificing a little time, comfort and QOL .  Accordingly, we have decided that the best plan to meet the patient's goals includes enrolling in hospice care.    Medications: Review discharge medications with patient and family and provide education.      Current Discharge Medication List        START taking these medications    Details   amoxicillin-clavulanate 875-125mg (AUGMENTIN) 875-125 mg per tablet Take 1 tablet by mouth every 12 (twelve) hours. for 1 day  Qty: 2 tablet, Refills: 0           CONTINUE these medications which have NOT CHANGED    Details   amLODIPine (NORVASC) 2.5 MG tablet Take 2.5 mg by mouth once daily.      apixaban (ELIQUIS) 5 mg Tab Take 5 mg by mouth 2 (two) times daily.       ascorbic acid, vitamin C, (VITAMIN C) 500 MG tablet Take 500 mg by mouth once daily.      aspirin (ECOTRIN) 81 MG EC tablet Take 81 mg by mouth once daily.      atorvastatin (LIPITOR) 20 MG tablet Take 20 mg by mouth once daily.      dulaglutide (TRULICITY) 0.75 mg/0.5 mL pen injector Inject 0.75 mg into the skin every 7 days.      lisinopriL (PRINIVIL,ZESTRIL) 5 MG tablet Take 5 mg by mouth once daily.      menthol-zinc oxide (CALMOSEPTINE) 0.44-20.6 % Oint Apply topically as needed (redness/irritation). Apply to buttocks/groin      povidone-iodine (BETADINE) 10 % external solution Apply topically 3 (three) times a week. Paint scabs/wounds to left foot and allow to air dry      vitamin D (VITAMIN D3) 1000 units Tab Take 1,000 Units by mouth once daily.            Patient is stable and ok to be on room air if O2 saturations remain above 90% or showing signs of breathing difficulty. If sats decline, titrate O2 nasal cannula as necessary.    _________________________________  Gabriele Lundberg MD  09/01/2023

## 2023-08-31 NOTE — CARE UPDATE
[facility]    FACILITY TRANSFER ORDERS      Patient Name: Axel Burgos  YOB: 1945    PCP: Ahsan Marinelli MD   PCP Address: [address] [place] [address] / [place] [address]  PCP Phone Number: [phone]  PCP Fax: [phone]    Encounter Date: 08/31/2023    Admit to: [facility]    Vital Signs:  Routine    Diagnoses:   Active Hospital Problems    Diagnosis  POA    *Aspiration pneumonitis [J69.0]  Unknown    Sinus tachycardia [R00.0]  Unknown    Vomiting [R11.10]  Unknown    COVID [U07.1]  Yes    Anticoagulant long-term use [Z79.01]  Not Applicable    Essential hypertension [I10]  Yes     Chronic    Type 2 diabetes mellitus, controlled [E11.9]  Yes     Chronic      Resolved Hospital Problems   No resolved problems to display.       Allergies:Review of patient's allergies indicates:  No Known Allergies    Diet: diabetic diet: 2000 calorie    Activities: Activity as tolerated    Goals of Care Treatment Preferences:  Code Status: Full Code    [facility] number: [phone]          What is most important right now is to focus on avoiding the hospital, symptom/pain control, quality of life, even if it means sacrificing a little time, comfort and QOL .  Accordingly, we have decided that the best plan to meet the patient's goals includes enrolling in hospice care.    Medications: Review discharge medications with patient and family and provide education.      Current Discharge Medication List        START taking these medications    Details   amoxicillin-clavulanate 875-125mg (AUGMENTIN) 875-125 mg per tablet Take 1 tablet by mouth every 12 (twelve) hours.  Qty: 4 tablet, Refills: 0           CONTINUE these medications which have NOT CHANGED    Details   amLODIPine (NORVASC) 2.5 MG tablet Take 2.5 mg by mouth once daily.      apixaban (ELIQUIS) 5 mg Tab Take 5 mg by mouth 2 (two) times daily.      ascorbic acid, vitamin C, (VITAMIN C) 500 MG tablet Take  500 mg by mouth once daily.      aspirin (ECOTRIN) 81 MG EC tablet Take 81 mg by mouth once daily.      atorvastatin (LIPITOR) 20 MG tablet Take 20 mg by mouth once daily.      dulaglutide (TRULICITY) 0.75 mg/0.5 mL pen injector Inject 0.75 mg into the skin every 7 days.      lisinopriL (PRINIVIL,ZESTRIL) 5 MG tablet Take 5 mg by mouth once daily.      menthol-zinc oxide (CALMOSEPTINE) 0.44-20.6 % Oint Apply topically as needed (redness/irritation). Apply to buttocks/groin      povidone-iodine (BETADINE) 10 % external solution Apply topically 3 (three) times a week. Paint scabs/wounds to left foot and allow to air dry      vitamin D (VITAMIN D3) 1000 units Tab Take 1,000 Units by mouth once daily.              _________________________________  Gabriele Lundberg MD  08/31/2023

## 2023-08-31 NOTE — DISCHARGE SUMMARY
[facility] Medicine  Discharge Summary      Patient Name: Axel Burgos  MRN: [id number]  JERAD: [id number]  Patient Class: IP- Inpatient  Admission Date: 2023  Hospital Length of Stay: 2 days  Discharge Date and Time:  2023 5:40 PM  Attending Physician: Kyle Lafleur MD   Discharging Provider: Maurice Jackson MD  Primary Care Provider: Ahsan Marinelli MD    Primary Care Team: Networked reference to record PCT     HPI:   77-year-old male with past medical history of diabetes, hypertension, no home oxy use. He presents from nursing facility with concern for worsening productive cough and shortness of breath for 2-3 days, afebrile and mild AMS, according to nursing home nurses. The patient vomited this evening and possibly aspirated. He was brought by EMS hypoxic to 80s with peripheral cyanosis.   The patient has no recollection of the evening's events, but is currently A&Ox3 with no complaints. He is unaware of taking daily medicines or any other medical history. Nonsmoker, denies etoh in recent years. States he frequently ambulates w/wheelchair.    In Emergency, 110/70, 118 HR, RR 30, 98.5F, SpO2 94% on 4lpm. Labs were significant for WBC 9.9, Hgb 9.4 (baseline 10.5), , Na+ 137, K+ 3.7,  Glu 154, BUN/Cr 27/0.8 (baseline wnl) VB.39/48/V/29.9. UA 3+ blood, >100 RBC on mircorscopy. CXR showed Airspace opacities in the right upper lung zone and left lung base. No radiopaque foreign body within the aerodigestive tract. POCUS: Many A-lines, minimal B-lines, adequate cardiac wall motion. EKG showed sinus tach w/wide complexes, partial RBBB (similar to previous),  no sign of ischemia. He was put on 5lpm oxy, given 1g ceftriaxone and admitted to [facility]  for management of aspiration pneumonitis and hypoxia.      * No surgery found *      Hospital Course:   On the floor, patient was continued on rocephin and oxygen for aspiration pneumonitis. The patient was also found to be  COVID positive, with criteria meeting severe COVID 19 based on hypoxemia with O2 saturations <94% on room air or on ambulation. Standard COVID protocol was initiated and the patient was started on remdesivir and dexamethasone. Patient was given zofran for nausea as well as Mx LR for sinus tachycardia. Patient's home eliquis, atorvastatin, and amlodipine were continued. Blood cultures were collected and have remained no growth to date for the entirety of the hospital course. Patient progressively improved throughout stay. Oxygen was weened and patient met O2 saturation goals of >90%. On day of discharge, patient felt improved since admissions. On day of discharge patient states he is agreeable to starting discharge medications. Verbalized understanding of discharge plan. All questions answered.    Vitals:    08/31/23 0742 08/31/23 1127 08/31/23 1158 08/31/23 1635   BP: (!) 108/57  (!) 90/53 (!) 97/55   BP Location: Left arm  Left arm Left arm   Patient Position: Lying  Lying Lying   Pulse: 75 82 75 73   Resp: 19  20 19   Temp: 96.1 °F (35.6 °C)  97 °F (36.1 °C) 96.2 °F (35.7 °C)   TempSrc: Axillary  Oral Oral   SpO2: (!) 94% (!) 92% (!) 92% (!) 92%   Weight:       Height:         Physical Exam  Vitals and nursing note reviewed.   Constitutional:       General: He is eating and will answer questions. He is not in acute distress.     Appearance: He is not ill-appearing or toxic-appearing.      Interventions: He is not intubated.Nasal cannula in place.   HENT:      Head: Normocephalic.   Cardiovascular:      Rate and Rhythm: Normal rate and regular rhythm.      Heart sounds: Normal heart sounds.   Pulmonary:      Effort: Pulmonary effort is normal. No accessory muscle usage, prolonged expiration or respiratory distress. He is not intubated.      Breath sounds: Normal breath sounds and air entry. Transmitted upper airway sounds present.   Abdominal:      General: Bowel sounds are normal.      Palpations: Abdomen is soft.       Tenderness: There is no abdominal tenderness.           Goals of Care Treatment Preferences:  Code Status: Full Code    Health care agent: Axel Flood  Health care agent number: [phone]          What is most important right now is to focus on avoiding the hospital, symptom/pain control, quality of life, even if it means sacrificing a little time, comfort and QOL .  Accordingly, we have decided that the best plan to meet the patient's goals includes enrolling in hospice care.      Consults:   Consults (From admission, onward)        Status Ordering Provider     IP consult to case management  Once        Provider:  (Not yet assigned)    KAZ Fang III          Pulmonary  * Aspiration pneumonitis  Reported vomiting with possible aspiration at nursing home (no reported home oxy use)  Patient has emesis on chin, he does not recall event  Oxy requirement: SpO2 94% on 5lpm  Peripheral cyanosis on arrival  Rhonchorous bilat  Cough in recent days, afebrile (possible pneumonia)  CXR: Airspace opacities in the right upper lung zone and left lung base. No radiopaque foreign body within the aerodigestive tract.  Lactate 2.1, WBC wnl  SIRS+ for tachycardia, tachypnea, possible lung infectious source    PLAN:  1g Rocephin qd  Procal  COVID   Wean oxy as tolerated       Cardiac/Vascular  Essential hypertension  Bp stable to soft     PLAN:  Continue 2.5mg amlo, hold lisinopril       Sinus tachycardia-resolved as of 8/31/2023  Sinus tach 110 to 130  Denies CP, Bp stable  EKG w/wide complexes, partial RBBB (similar to previous),  no sign of ischemia  Echo: no dysfunction, EF 55% 8/30/23    PLAN:  Mx LR  Infectious workup and treatment per aspiration pneumonitis        ID  COVID  Patient is identified as Severe COVID-19 based on hypoxemia with O2 saturations <94% on room air or on ambulation   Initiate standard COVID protocols; COVID-19 testing ,Infection Control notification  and isolation- respiratory, contact  "and droplet per protocol    Diagnostics: CBC, CMP, Procalcitonin, Ferritin, CRP, Blood Culture x2 and Portable CXR    Management: Maintain oxygen saturations 92-96% via Nasal Cannula 3 LPM and monitor with continuous/intermittent pulse oximetry. , Inhaled bronchodilators as needed for shortness of breath. and Manage respiratory failure (O2 requirement >10LPM or needing NIPPV/Mechanical ventilation) and/or Pneumonia (active chest infiltrates) separately as described below.    Advance Care Planning  Current advance care plan has not been discussed with patient/family/POA and patient currently wishes Full Code.     -Currently on Remdesivir 100mg infusion and dexamethasone 6mg qDay PO.  -Droplet and airborne precautions  -F/u with family to determine baseline status and goals of care    Hematology  Anticoagulant long-term use  Patient unaware of reason for eliquis... CVA? PVD? Vericose veins, TIA/CVA listed in chart  This admit: 3+ blood on UA, >100 RBC on microscopy, nurse states cath not especially traumatic  Hgb 9.4,     PLAN:  Monitor Hbg  Continue 5mg eliquis      Endocrine  Type 2 diabetes mellitus, controlled  Patient's FSGs are controlled on current medication regimen.  Last A1c reviewed-   Lab Results   Component Value Date    HGBA1C 5.2 08/30/2023     Most recent fingerstick glucose reviewed- No results for input(s): "POCTGLUCOSE" in the last 24 hours.  Current correctional scale  diet  Maintain anti-hyperglycemic dose as follows-   Antihyperglycemics (From admission, onward)    None        Hold Oral hypoglycemics while patient is in the hospital. Diet controlled... Currently NPO    PLAN:  Monitor    GI  Vomiting-resolved as of 8/31/2023  Vomiting (unclear how many times) non-bloody emesis in container in ER      PLAN:  Zofran prn  Mx LR  NPO pending Speech eval    Final Active Diagnoses:    Diagnosis Date Noted POA    PRINCIPAL PROBLEM:  Aspiration pneumonitis [J69.0] 08/30/2023 Yes    COVID [U07.1] " 08/30/2023 Yes    Anticoagulant long-term use [Z79.01] 09/09/2022 Not Applicable    Essential hypertension [I10] 07/18/2017 Yes     Chronic    Type 2 diabetes mellitus, controlled [E11.9] 07/18/2017 Yes     Chronic      Problems Resolved During this Admission:    Diagnosis Date Noted Date Resolved POA    Sinus tachycardia [R00.0] 08/30/2023 08/31/2023 Yes    Vomiting [R11.10] 08/30/2023 08/31/2023 Yes       Discharged Condition: good    Disposition: Home or Self Care    Follow Up:    Patient Instructions:      Diet Dysphagia Pureed     Notify your health care provider if you experience any of the following:  increased confusion or weakness     Notify your health care provider if you experience any of the following:  persistent dizziness, light-headedness, or visual disturbances     Notify your health care provider if you experience any of the following:  severe persistent headache     Notify your health care provider if you experience any of the following:  difficulty breathing or increased cough     Notify your health care provider if you experience any of the following:  severe uncontrolled pain     Notify your health care provider if you experience any of the following:  temperature >100.4     Notify your health care provider if you experience any of the following:  persistent nausea and vomiting or diarrhea     Activity as tolerated         Pending Diagnostic Studies:     None         Medications:  Reconciled Home Medications:      Medication List      START taking these medications    amoxicillin-clavulanate 875-125mg 875-125 mg per tablet  Commonly known as: AUGMENTIN  Take 1 tablet by mouth every 12 (twelve) hours.  Start taking on: September 1, 2023        CONTINUE taking these medications    amLODIPine 2.5 MG tablet  Commonly known as: NORVASC  Take 2.5 mg by mouth once daily.     aspirin 81 MG EC tablet  Commonly known as: ECOTRIN  Take 81 mg by mouth once daily.     atorvastatin 20 MG  tablet  Commonly known as: LIPITOR  Take 20 mg by mouth once daily.     CALMOSEPTINE 0.44-20.6 % Oint  Generic drug: menthol-zinc oxide  Apply topically as needed (redness/irritation). Apply to buttocks/groin     ELIQUIS 5 mg Tab  Generic drug: apixaban  Take 5 mg by mouth 2 (two) times daily.     lisinopriL 5 MG tablet  Commonly known as: PRINIVIL,ZESTRIL  Take 5 mg by mouth once daily.     povidone-iodine 10 % external solution  Commonly known as: BETADINE  Apply topically 3 (three) times a week. Paint scabs/wounds to left foot and allow to air dry     TRULICITY 0.75 mg/0.5 mL pen injector  Generic drug: dulaglutide  Inject 0.75 mg into the skin every 7 days.     VITAMIN C 500 MG tablet  Generic drug: ascorbic acid (vitamin C)  Take 500 mg by mouth once daily.     vitamin D 1000 units Tab  Commonly known as: VITAMIN D3  Take 1,000 Units by mouth once daily.            Indwelling Lines/Drains at time of discharge:   Lines/Drains/Airways     None                 Time spent on the discharge of patient: 35 minutes         Maurice Jackson MD  Department of Hospital Medicine  [facility]

## 2023-08-31 NOTE — PLAN OF CARE
"[place] - Telemetry  Discharge Final Note    Primary Care Provider: Ahsan Marinelli MD    Expected Discharge Date: 8/31/2023    Final Discharge Note (most recent)       Final Note - 08/31/23 1457          Final Note    Assessment Type Final Discharge Note     Anticipated Discharge Disposition long-term Nursing Home     Hospital Resources/Appts/Education Provided Post-Acute resouces added to AVS (P)         Post-Acute Status    Post-Acute Authorization Placement (P)      Post-Acute Placement Status Set-up Complete/Auth obtained (P)    Orders accepted by . Pt to DC on RA. Facility to  patient for return. Report info communicated to nursing.    Discharge Delays Personal Transportation (P)    Facility transportation to pickup patient. VM left for son to return call about DC for today to return.                  No future appointments.     BP (!) 90/53 (BP Location: Left arm, Patient Position: Lying)   Pulse 75   Temp 97 °F (36.1 °C) (Oral)   Resp 20   Ht 6' 2" (1.88 m)   Wt 67.6 kg (149 lb)   SpO2 (!) 92%   BMI 19.13 kg/m²        Medication List        START taking these medications      amoxicillin-clavulanate 875-125mg 875-125 mg per tablet  Commonly known as: AUGMENTIN  Take 1 tablet by mouth every 12 (twelve) hours.  Start taking on: September 1, 2023            CONTINUE taking these medications      amLODIPine 2.5 MG tablet  Commonly known as: NORVASC     aspirin 81 MG EC tablet  Commonly known as: ECOTRIN     atorvastatin 20 MG tablet  Commonly known as: LIPITOR     CALMOSEPTINE 0.44-20.6 % Oint  Generic drug: menthol-zinc oxide     ELIQUIS 5 mg Tab  Generic drug: apixaban     lisinopriL 5 MG tablet  Commonly known as: PRINIVIL,ZESTRIL     povidone-iodine 10 % external solution  Commonly known as: BETADINE     TRULICITY 0.75 mg/0.5 mL pen injector  Generic drug: dulaglutide     VITAMIN C 500 MG tablet  Generic drug: ascorbic acid (vitamin C)     vitamin D 1000 units " Tab  Commonly known as: VITAMIN D3               Where to Get Your Medications        These medications were sent to [facility] [address], [place] [address]      Hours: Mon-Fri, 8a-5:30p Phone: [phone]   amoxicillin-clavulanate 875-125mg 875-125 mg per tablet       No orders of the defined types were placed in this encounter.

## 2023-08-31 NOTE — PLAN OF CARE
Nurse tried to sit pt up onside of bed. Pt unable to sit onside of the bed. Pt sats dropped to 92% on RA with movement. Pt satting 97% at rest.

## 2023-08-31 NOTE — HOSPITAL COURSE
On the floor, patient was continued on rocephin and oxygen for aspiration pneumonitis. The patient was also found to be COVID positive, with criteria meeting severe COVID 19 based on hypoxemia with O2 saturations <94% on room air or on ambulation. Standard COVID protocol was initiated and the patient was started on remdesivir and dexamethasone. Patient was given zofran for nausea as well as Mx LR for sinus tachycardia. Patient's home eliquis, atorvastatin, and amlodipine were continued. Blood cultures were collected and have remained no growth to date for the entirety of the hospital course. Patient progressively improved throughout stay. Oxygen was weened and patient met O2 saturation goals of >90%. On day of discharge, patient felt improved since admissions. On day of discharge patient states he is agreeable to starting discharge medications. Verbalized understanding of discharge plan. All questions answered.    Vitals:    08/31/23 0742 08/31/23 1127 08/31/23 1158 08/31/23 1635   BP: (!) 108/57  (!) 90/53 (!) 97/55   BP Location: Left arm  Left arm Left arm   Patient Position: Lying  Lying Lying   Pulse: 75 82 75 73   Resp: 19  20 19   Temp: 96.1 °F (35.6 °C)  97 °F (36.1 °C) 96.2 °F (35.7 °C)   TempSrc: Axillary  Oral Oral   SpO2: (!) 94% (!) 92% (!) 92% (!) 92%   Weight:       Height:         Physical Exam  Vitals and nursing note reviewed.   Constitutional:       General: He is eating and will answer questions. He is not in acute distress.     Appearance: He is not ill-appearing or toxic-appearing.      Interventions: He is not intubated.Nasal cannula in place.   HENT:      Head: Normocephalic.   Cardiovascular:      Rate and Rhythm: Normal rate and regular rhythm.      Heart sounds: Normal heart sounds.   Pulmonary:      Effort: Pulmonary effort is normal. No accessory muscle usage, prolonged expiration or respiratory distress. He is not intubated.      Breath sounds: Normal breath sounds and air entry.  Transmitted upper airway sounds present.   Abdominal:      General: Bowel sounds are normal.      Palpations: Abdomen is soft.      Tenderness: There is no abdominal tenderness.

## 2023-08-31 NOTE — DISCHARGE INSTRUCTIONS
[facility]     FACILITY TRANSFER ORDERS        Patient Name: Axel Burgos  YOB: 1945     PCP: Ahsan Marinelli MD   PCP Address: [address] [place] [address] / [place] [id number]  PCP Phone Number: [phone]  PCP Fax: [phone]     Encounter Date: 08/31/2023     Admit to: [facility]     Vital Signs:  Routine     Diagnoses:          Active Hospital Problems     Diagnosis   POA    *Aspiration pneumonitis [J69.0]   Yes       Priority: 1 - High    COVID [U07.1]   Yes       Priority: 2     Anticoagulant long-term use [Z79.01]   Not Applicable    Essential hypertension [I10]   Yes       Chronic    Type 2 diabetes mellitus, controlled [E11.9]   Yes       Chronic       Resolved Hospital Problems     Diagnosis Date Resolved POA    Sinus tachycardia [R00.0] 08/31/2023 Yes    Vomiting [R11.10] 08/31/2023 Yes         Allergies:Review of patient's allergies indicates:  No Known Allergies     Diet: pureed diet thin     Activities: Activity as tolerated     Goals of Care Treatment Preferences:  Code Status: Full Code     [facility] [phone]     What is most important right now is to focus on avoiding the hospital, symptom/pain control, quality of life, even if it means sacrificing a little time, comfort and QOL .  Accordingly, we have decided that the best plan to meet the patient's goals includes enrolling in hospice care.     Medications: Review discharge medications with patient and family and provide education.       Current Discharge Medication List              START taking these medications     Details   amoxicillin-clavulanate 875-125mg (AUGMENTIN) 875-125 mg per tablet Take 1 tablet by mouth every 12 (twelve) hours.  Qty: 4 tablet, Refills: 0                  CONTINUE these medications which have NOT CHANGED     Details   amLODIPine (NORVASC) 2.5 MG tablet Take 2.5 mg by mouth once daily.       apixaban (ELIQUIS) 5 mg Tab Take 5 mg by mouth  2 (two) times daily.       ascorbic acid, vitamin C, (VITAMIN C) 500 MG tablet Take 500 mg by mouth once daily.       aspirin (ECOTRIN) 81 MG EC tablet Take 81 mg by mouth once daily.       atorvastatin (LIPITOR) 20 MG tablet Take 20 mg by mouth once daily.       dulaglutide (TRULICITY) 0.75 mg/0.5 mL pen injector Inject 0.75 mg into the skin every 7 days.       lisinopriL (PRINIVIL,ZESTRIL) 5 MG tablet Take 5 mg by mouth once daily.       menthol-zinc oxide (CALMOSEPTINE) 0.44-20.6 % Oint Apply topically as needed (redness/irritation). Apply to buttocks/groin       povidone-iodine (BETADINE) 10 % external solution Apply topically 3 (three) times a week. Paint scabs/wounds to left foot and allow to air dry       vitamin D (VITAMIN D3) 1000 units Tab Take 1,000 Units by mouth once daily.              Patient is stable and ok to be on room air if O2 saturations remain above 90% or showing signs of breathing difficulty. If sats decline, titrate O2 nasal cannula as necessary.     _________________________________  Maurice Jackson MD  08/31/2023                    Revision History

## 2023-08-31 NOTE — PLAN OF CARE
Problem: SLP  Goal: SLP Goal  Description: Goals:  1. Pt will tolerate a pureed diet with thin liquids w/o any overt s/s of aspiration. -Met 8/31  2. Pt will tolerate a mech soft diet with minced meats and thin liquids w/o any overt s/s of aspiration.       Outcome: Ongoing, Progressing    Pt is safe to advance to a mech soft diet with minced meats and cont thin liquids.

## 2023-08-31 NOTE — PLAN OF CARE
"  Problem: Adult Inpatient Plan of Care  Goal: Optimal Comfort and Wellbeing  Outcome: Ongoing, Progressing     Problem: Adult Inpatient Plan of Care  Goal: Plan of Care Review  Outcome: Ongoing, Progressing     Problem: Skin Injury Risk Increased  Goal: Skin Health and Integrity  Outcome: Ongoing, Progressing     Problem: Fall Injury Risk  Goal: Absence of Fall and Fall-Related Injury  Outcome: Ongoing, Progressing    .Plan of care reviewed with the patient. Scheduled medicines given and the patient tolerated well. Fall and safety precautions taken and the standard interventions are in place. On Telemetry monitoring with NSR, no true "red alarms' noted, no acute distress reported either in the shift. Advised the patient to call for the assistance. Continued monitoring the patient.      "

## 2023-08-31 NOTE — ASSESSMENT & PLAN NOTE
"Patient's FSGs are controlled on current medication regimen.  Last A1c reviewed-   Lab Results   Component Value Date    HGBA1C 5.2 08/30/2023     Most recent fingerstick glucose reviewed- No results for input(s): "POCTGLUCOSE" in the last 24 hours.  Current correctional scale  diet  Maintain anti-hyperglycemic dose as follows-   Antihyperglycemics (From admission, onward)    None        Hold Oral hypoglycemics while patient is in the hospital. Diet controlled... Currently NPO    PLAN:  Monitor  "

## 2023-08-31 NOTE — PT/OT/SLP PROGRESS
Speech Language Pathology Treatment    Patient Name:  Axel Burgos   MRN:  [id number]  Admitting Diagnosis: Aspiration pneumonitis    Recommendations:                 General Recommendations:   ST f/u x1 to ensure diet tolerance   Diet recommendations:  Minced & Moist Diet - IDDSI Level 5, Liquid Diet Level: Thin liquids - IDDSI Level 0   Aspiration Precautions: 1 bite/sip at a time, Alternating bites/sips, Assistance with meals, HOB to 90 degrees, Meds crushed in puree, and Small bites/sips   General Precautions: Standard,    Communication strategies:  provide increased time to answer    Assessment:     Axel Burgos is a 78 y.o. male with a dx of Aspiration pneumonitis, who presents with improved breathing/swallowing coordination, as well as dcr oral dysphagia with solids. ST recs advancing diet to minced with thin liquids. ST will f/u x1 to ensure diet tolerance.     Subjective     Pt was alert and awake, seated upright in bed with lunch tray across tray table. ST noted pt had consumed 100% of meal tray. Pt was agreeable to cont swallowing ax to determine safety for diet advancement. ST noted pt's NC in bed and off pt. Pt's nurse notified, and stated the pt does not need O2 at this time.     Patient goals: Cont po intake      Pain/Comfort:  Pain Rating 1: 0/10    Respiratory Status: Room air    Objective:     Has the patient been evaluated by SLP for swallowing?   Yes  Keep patient NPO? No   Current Respiratory Status:        The pt consumed the following po items: x2 bite sized pieces of cracker, as well as x4 straw sips of water. Pt with mild prolonged mastication with solid boluses 2/2 scattered dentition, however, given increased time the pt cleared all boluses from the oral cavity. No overt s/s of aspiration noted across consistencies.     ST discussed recs with the pt including advancement to a minced diet with thin liquids. Pt was in agreement with recs. ST notified pt's nurse of diet advancement.     Goals:    Multidisciplinary Problems       SLP Goals          Problem: SLP    Goal Priority Disciplines Outcome   SLP Goal    Low SLP Ongoing, Progressing   Description: Goals:  1. Pt will tolerate a pureed diet with thin liquids w/o any overt s/s of aspiration. -Met 8/31  2. Pt will tolerate a mech soft diet with minced meats and thin liquids w/o any overt s/s of aspiration.                            Plan:     Patient to be seen:  3 x/week   Plan of Care expires:     Plan of Care reviewed with:  patient   SLP Follow-Up:  Yes       Discharge recommendations:      Barriers to Discharge:  None    Time Tracking:     SLP Treatment Date:   08/31/23  Speech Start Time:  1240  Speech Stop Time:  1256     Speech Total Time (min):  16 min    Billable Minutes: Treatment Swallowing Dysfunction 16    08/31/2023

## 2023-09-01 VITALS
WEIGHT: 149 LBS | BODY MASS INDEX: 19.12 KG/M2 | RESPIRATION RATE: 17 BRPM | OXYGEN SATURATION: 100 % | DIASTOLIC BLOOD PRESSURE: 59 MMHG | HEIGHT: 74 IN | TEMPERATURE: 98 F | SYSTOLIC BLOOD PRESSURE: 108 MMHG | HEART RATE: 61 BPM

## 2023-09-01 DIAGNOSIS — U07.1 COVID-19 VIRUS DETECTED: ICD-10-CM

## 2023-09-01 LAB
ALBUMIN SERPL BCP-MCNC: 2.2 G/DL (ref 3.5–5.2)
ALP SERPL-CCNC: 64 U/L (ref 55–135)
ALT SERPL W/O P-5'-P-CCNC: 37 U/L (ref 10–44)
ANION GAP SERPL CALC-SCNC: 5 MMOL/L (ref 8–16)
AST SERPL-CCNC: 61 U/L (ref 10–40)
BASOPHILS # BLD AUTO: 0.01 K/UL (ref 0–0.2)
BASOPHILS NFR BLD: 0.1 % (ref 0–1.9)
BILIRUB SERPL-MCNC: 0.5 MG/DL (ref 0.1–1)
BUN SERPL-MCNC: 32 MG/DL (ref 8–23)
CALCIUM SERPL-MCNC: 8.9 MG/DL (ref 8.7–10.5)
CHLORIDE SERPL-SCNC: 103 MMOL/L (ref 95–110)
CO2 SERPL-SCNC: 29 MMOL/L (ref 23–29)
CREAT SERPL-MCNC: 0.8 MG/DL (ref 0.5–1.4)
DIFFERENTIAL METHOD: ABNORMAL
EOSINOPHIL # BLD AUTO: 0 K/UL (ref 0–0.5)
EOSINOPHIL NFR BLD: 0 % (ref 0–8)
ERYTHROCYTE [DISTWIDTH] IN BLOOD BY AUTOMATED COUNT: 17.3 % (ref 11.5–14.5)
EST. GFR  (NO RACE VARIABLE): >60 ML/MIN/1.73 M^2
GLUCOSE SERPL-MCNC: 161 MG/DL (ref 70–110)
HCT VFR BLD AUTO: 25.4 % (ref 40–54)
HGB BLD-MCNC: 8.2 G/DL (ref 14–18)
IMM GRANULOCYTES # BLD AUTO: 0.26 K/UL (ref 0–0.04)
IMM GRANULOCYTES NFR BLD AUTO: 2.6 % (ref 0–0.5)
LYMPHOCYTES # BLD AUTO: 1.1 K/UL (ref 1–4.8)
LYMPHOCYTES NFR BLD: 11.2 % (ref 18–48)
MAGNESIUM SERPL-MCNC: 2 MG/DL (ref 1.6–2.6)
MCH RBC QN AUTO: 32.9 PG (ref 27–31)
MCHC RBC AUTO-ENTMCNC: 32.3 G/DL (ref 32–36)
MCV RBC AUTO: 102 FL (ref 82–98)
MONOCYTES # BLD AUTO: 0.6 K/UL (ref 0.3–1)
MONOCYTES NFR BLD: 5.4 % (ref 4–15)
NEUTROPHILS # BLD AUTO: 8.2 K/UL (ref 1.8–7.7)
NEUTROPHILS NFR BLD: 80.7 % (ref 38–73)
NRBC BLD-RTO: 0 /100 WBC
PHOSPHATE SERPL-MCNC: 3 MG/DL (ref 2.7–4.5)
PLATELET # BLD AUTO: 259 K/UL (ref 150–450)
PMV BLD AUTO: 10.4 FL (ref 9.2–12.9)
POTASSIUM SERPL-SCNC: 4.3 MMOL/L (ref 3.5–5.1)
PROT SERPL-MCNC: 5.4 G/DL (ref 6–8.4)
RBC # BLD AUTO: 2.49 M/UL (ref 4.6–6.2)
SODIUM SERPL-SCNC: 137 MMOL/L (ref 136–145)
WBC # BLD AUTO: 10.14 K/UL (ref 3.9–12.7)

## 2023-09-01 PROCEDURE — 25000003 PHARM REV CODE 250: Mod: HCNC

## 2023-09-01 PROCEDURE — 36415 COLL VENOUS BLD VENIPUNCTURE: CPT | Mod: HCNC

## 2023-09-01 PROCEDURE — 83735 ASSAY OF MAGNESIUM: CPT | Mod: HCNC

## 2023-09-01 PROCEDURE — 63600175 PHARM REV CODE 636 W HCPCS: Mod: HCNC

## 2023-09-01 PROCEDURE — 80053 COMPREHEN METABOLIC PANEL: CPT | Mod: HCNC

## 2023-09-01 PROCEDURE — 85025 COMPLETE CBC W/AUTO DIFF WBC: CPT | Mod: HCNC

## 2023-09-01 PROCEDURE — 84100 ASSAY OF PHOSPHORUS: CPT | Mod: HCNC

## 2023-09-01 RX ORDER — AMOXICILLIN AND CLAVULANATE POTASSIUM 875; 125 MG/1; MG/1
1 TABLET, FILM COATED ORAL EVERY 12 HOURS
Qty: 2 TABLET | Refills: 0 | Status: SHIPPED | OUTPATIENT
Start: 2023-09-02 | End: 2023-09-03

## 2023-09-01 RX ADMIN — CHOLECALCIFEROL TAB 25 MCG (1000 UNIT) 1000 UNITS: 25 TAB at 08:09

## 2023-09-01 RX ADMIN — ATORVASTATIN CALCIUM 20 MG: 20 TABLET, FILM COATED ORAL at 08:09

## 2023-09-01 RX ADMIN — ASPIRIN 81 MG: 81 TABLET, COATED ORAL at 08:09

## 2023-09-01 RX ADMIN — REMDESIVIR 100 MG: 100 INJECTION, POWDER, LYOPHILIZED, FOR SOLUTION INTRAVENOUS at 08:09

## 2023-09-01 RX ADMIN — OXYCODONE HYDROCHLORIDE AND ACETAMINOPHEN 500 MG: 500 TABLET ORAL at 08:09

## 2023-09-01 RX ADMIN — CEFTRIAXONE SODIUM 1 G: 1 INJECTION, POWDER, FOR SOLUTION INTRAMUSCULAR; INTRAVENOUS at 09:09

## 2023-09-01 RX ADMIN — DEXAMETHASONE 6 MG: 4 TABLET ORAL at 08:09

## 2023-09-01 RX ADMIN — APIXABAN 5 MG: 5 TABLET, FILM COATED ORAL at 08:09

## 2023-09-01 RX ADMIN — MUPIROCIN: 20 OINTMENT TOPICAL at 08:09

## 2023-09-01 NOTE — PROGRESS NOTES
[facility] Medicine  Progress Note    Patient Name: Axel Burgos  MRN: [id number]  Patient Class: IP- Inpatient   Admission Date: 2023  Length of Stay: 3 days  Attending Physician: No att. providers found  Primary Care Provider: Ahsan Marinelli MD        Subjective:     Principal Problem:Aspiration pneumonitis        HPI:  77-year-old male with past medical history of diabetes, hypertension, no home oxy use. He presents from nursing facility with concern for worsening productive cough and shortness of breath for 2-3 days, afebrile and mild AMS, according to nursing home nurses. The patient vomited this evening and possibly aspirated. He was brought by EMS hypoxic to 80s with peripheral cyanosis.   The patient has no recollection of the evening's events, but is currently A&Ox3 with no complaints. He is unaware of taking daily medicines or any other medical history. Nonsmoker, denies etoh in recent years. States he frequently ambulates w/wheelchair.    In Emergency, 110/70, 118 HR, RR 30, 98.5F, SpO2 94% on 4lpm. Labs were significant for WBC 9.9, Hgb 9.4 (baseline 10.5), , Na+ 137, K+ 3.7,  Glu 154, BUN/Cr 27/0.8 (baseline wnl) VB.39/48/V/29.9. UA 3+ blood, >100 RBC on mircorscopy. CXR showed Airspace opacities in the right upper lung zone and left lung base. No radiopaque foreign body within the aerodigestive tract. POCUS: Many A-lines, minimal B-lines, adequate cardiac wall motion. EKG showed sinus tach w/wide complexes, partial RBBB (similar to previous),  no sign of ischemia. He was put on 5lpm oxy, given 1g ceftriaxone and admitted to [facility] for management of aspiration pneumonitis and hypoxia.      Overview/Hospital Course:  On the floor, patient was continued on rocephin and oxygen for aspiration pneumonitis. The patient was also found to be COVID positive, with criteria meeting severe COVID 19 based on hypoxemia with O2 saturations <94% on room air or on ambulation.  Standard COVID protocol was initiated and the patient was started on remdesivir and dexamethasone. Patient was given zofran for nausea as well as Mx LR for sinus tachycardia. Patient's home eliquis, atorvastatin, and amlodipine were continued. Blood cultures were collected and have remained no growth to date for the entirety of the hospital course. Patient progressively improved throughout stay. Oxygen was weened and patient met O2 saturation goals of >90%. On day of discharge, patient felt improved since admissions. On day of discharge patient states he is agreeable to starting discharge medications. Verbalized understanding of discharge plan. All questions answered.    Vitals:    08/31/23 0742 08/31/23 1127 08/31/23 1158 08/31/23 1635   BP: (!) 108/57  (!) 90/53 (!) 97/55   BP Location: Left arm  Left arm Left arm   Patient Position: Lying  Lying Lying   Pulse: 75 82 75 73   Resp: 19  20 19   Temp: 96.1 °F (35.6 °C)  97 °F (36.1 °C) 96.2 °F (35.7 °C)   TempSrc: Axillary  Oral Oral   SpO2: (!) 94% (!) 92% (!) 92% (!) 92%   Weight:       Height:         Physical Exam  Vitals and nursing note reviewed.   Constitutional:       General: He is eating and will answer questions. He is not in acute distress.     Appearance: He is not ill-appearing or toxic-appearing.      Interventions: He is not intubated.Nasal cannula in place.   HENT:      Head: Normocephalic.   Cardiovascular:      Rate and Rhythm: Normal rate and regular rhythm.      Heart sounds: Normal heart sounds.   Pulmonary:      Effort: Pulmonary effort is normal. No accessory muscle usage, prolonged expiration or respiratory distress. He is not intubated.      Breath sounds: Normal breath sounds and air entry. Transmitted upper airway sounds present.   Abdominal:      General: Bowel sounds are normal.      Palpations: Abdomen is soft.      Tenderness: There is no abdominal tenderness.           Interval History: No adverse events overnight. Was scheduled for  discharge yesterday, but facility was unable to complete. He will leave today when transport arrives. Resting comfortably in the meantime. No acute complaints. Still on NC O2    Review of Systems   Constitutional:  Positive for fatigue.   Respiratory:  Positive for shortness of breath.    Cardiovascular: Negative.    Gastrointestinal: Negative.    Genitourinary: Negative.    Musculoskeletal: Negative.    Neurological:  Positive for weakness.         Objective:     Vital Signs (Most Recent):  Temp: 98.2 °F (36.8 °C) (09/01/23 0751)  Pulse: 61 (09/01/23 0751)  Resp: 17 (09/01/23 0751)  BP: (!) 108/59 (09/01/23 0751)  SpO2: 100 % (09/01/23 0751) Vital Signs (24h Range):  Temp:  [96.3 °F (35.7 °C)-98.2 °F (36.8 °C)] 98.2 °F (36.8 °C)  Pulse:  [61-89] 61  Resp:  [17-20] 17  SpO2:  [86 %-100 %] 100 %  BP: (100-129)/(58-66) 108/59     Weight: 67.6 kg (149 lb)  Body mass index is 19.13 kg/m².    Intake/Output Summary (Last 24 hours) at 9/1/2023 1724  Last data filed at 9/1/2023 0941  Gross per 24 hour   Intake 430 ml   Output --   Net 430 ml         Physical Exam  Vitals and nursing note reviewed.   Constitutional:       General: He is not in acute distress.     Appearance: Normal appearance.   HENT:      Head: Normocephalic and atraumatic.      Nose: Nose normal.   Cardiovascular:      Rate and Rhythm: Normal rate and regular rhythm.      Pulses: Normal pulses.      Heart sounds: Normal heart sounds.   Pulmonary:      Effort: Pulmonary effort is normal.      Breath sounds: Normal breath sounds.   Abdominal:      General: Abdomen is flat. Bowel sounds are normal. There is no distension.      Tenderness: There is no abdominal tenderness.   Skin:     General: Skin is warm and dry.   Neurological:      General: No focal deficit present.      Mental Status: He is oriented to person, place, and time.          Significant Labs: All pertinent labs within the past 24 hours have been reviewed.  Recent Lab Results          09/01/23  0452        Albumin 2.2       Alkaline Phosphatase 64       ALT 37       Anion Gap 5       AST 61       Baso # 0.01       Basophil % 0.1       BILIRUBIN TOTAL 0.5  Comment: For infants and newborns, interpretation of results should be based  on gestational age, weight and in agreement with clinical  observations.    Premature Infant recommended reference ranges:  Up to 24 hours.............<8.0 mg/dL  Up to 48 hours............<12.0 mg/dL  3-5 days..................<15.0 mg/dL  6-29 days.................<15.0 mg/dL         BUN 32       Calcium 8.9       Chloride 103       CO2 29       Creatinine 0.8       Differential Method Automated       eGFR >60       Eos # 0.0       Eosinophil % 0.0       Glucose 161       Gran # (ANC) 8.2       Gran % 80.7       Hematocrit 25.4       Hemoglobin 8.2       Immature Grans (Abs) 0.26  Comment: Mild elevation in immature granulocytes is non specific and   can be seen in a variety of conditions including stress response,   acute inflammation, trauma and pregnancy. Correlation with other   laboratory and clinical findings is essential.         Immature Granulocytes 2.6       Lymph # 1.1       Lymph % 11.2       Magnesium  2.0       MCH 32.9       MCHC 32.3              Mono # 0.6       Mono % 5.4       MPV 10.4       nRBC 0       Phosphorus 3.0       Platelets 259       Potassium 4.3       PROTEIN TOTAL 5.4       RBC 2.49       RDW 17.3       Sodium 137       WBC 10.14               Significant Imaging: I have reviewed all pertinent imaging results/findings within the past 24 hours.      Assessment/Plan:      * Aspiration pneumonitis  Reported vomiting with possible aspiration at nursing home (no reported home oxy use)  Patient has emesis on chin, he does not recall event  Oxy requirement: SpO2 94% on 5lpm  Peripheral cyanosis on arrival  Rhonchorous bilat  Cough in recent days, afebrile (possible pneumonia)  CXR: Airspace opacities in the right upper lung zone and  "left lung base. No radiopaque foreign body within the aerodigestive tract.  Lactate 2.1, WBC wnl  SIRS+ for tachycardia, tachypnea, possible lung infectious source    PLAN:  1g Rocephin qd  Procal  COVID   Wean oxy as tolerated       COVID  Patient is identified as Severe COVID-19 based on hypoxemia with O2 saturations <94% on room air or on ambulation   Initiate standard COVID protocols; COVID-19 testing ,Infection Control notification  and isolation- respiratory, contact and droplet per protocol    Diagnostics: CBC, CMP, Procalcitonin, Ferritin, CRP, Blood Culture x2 and Portable CXR    Management: Maintain oxygen saturations 92-96% via Nasal Cannula 3 LPM and monitor with continuous/intermittent pulse oximetry. , Inhaled bronchodilators as needed for shortness of breath. and Manage respiratory failure (O2 requirement >10LPM or needing NIPPV/Mechanical ventilation) and/or Pneumonia (active chest infiltrates) separately as described below.    Advance Care Planning  Current advance care plan has not been discussed with patient/family/POA and patient currently wishes Full Code.     -Currently on Remdesivir 100mg infusion and dexamethasone 6mg qDay PO.  -Droplet and airborne precautions  -F/u with family to determine baseline status and goals of care    Anticoagulant long-term use  Patient unaware of reason for eliquis... CVA? PVD? Vericose veins, TIA/CVA listed in chart  This admit: 3+ blood on UA, >100 RBC on microscopy, nurse states cath not especially traumatic  Hgb 9.4,     PLAN:  Monitor Hbg  Continue 5mg eliquis      Type 2 diabetes mellitus, controlled  Patient's FSGs are controlled on current medication regimen.  Last A1c reviewed-   Lab Results   Component Value Date    HGBA1C 5.2 08/30/2023     Most recent fingerstick glucose reviewed- No results for input(s): "POCTGLUCOSE" in the last 24 hours.  Current correctional scale  diet  Maintain anti-hyperglycemic dose as follows-   Antihyperglycemics (From " admission, onward)    None        Hold Oral hypoglycemics while patient is in the hospital. Diet controlled... Currently NPO    PLAN:  Monitor    Essential hypertension  Bp stable to soft     PLAN:  Continue 2.5mg amlo, hold lisinopril         VTE Risk Mitigation (From admission, onward)         Ordered     IP VTE HIGH RISK PATIENT  Once         08/29/23 2304                Discharge Planning   JESSI: 8/31/2023     Code Status: Prior   Is the patient medically ready for discharge?:     Reason for patient still in hospital (select all that apply): Pending disposition  Discharge Plan A: Return to nursing home   Discharge Delays: (!) Personal Transportation (Facility transportation to pickup patient. VM left for son to return call about DC for today to return.)              Maurice Jackson MD  Department of Hospital Medicine   [place] [facility]

## 2023-09-01 NOTE — PLAN OF CARE
Problem: Adult Inpatient Plan of Care  Goal: Plan of Care Review  Outcome: Ongoing, Progressing     Problem: Adult Inpatient Plan of Care  Goal: Optimal Comfort and Wellbeing  Outcome: Ongoing, Progressing     Problem: Fall Injury Risk  Goal: Absence of Fall and Fall-Related Injury  Outcome: Ongoing, Progressing     Problem: Infection (Pneumonia)  Goal: Resolution of Infection Signs and Symptoms  Outcome: Ongoing, Progressing     Problem: Respiratory Compromise (Pneumonia)  Goal: Effective Oxygenation and Ventilation  Outcome: Ongoing, Progressing     .Plan of care reviewed with the patient. Scheduled medicines given and the patient tolerated well.  Fall and safety precautions taken and the standard interventions are in place. No acute distress reported on the shift. On Oxygen 3 L and satting well. Advised the patient to call for the assistance. Continued monitoring the patient.

## 2023-09-01 NOTE — PLAN OF CARE
Report called to Cynthia at [facility]. Nurse up dated on POC and pts oxygen requirements.

## 2023-09-01 NOTE — PLAN OF CARE
"[facility] - Telemetry  Discharge Final Note    Primary Care Provider: Ahsan Marinelli MD    Expected Discharge Date: 8/31/2023    [facility] Ambulance was not ordered by facility to  patient. [facility] transport requested in case facility cannot . Management notified of delay. Orders to be updated to show oxygen. Son Axel notified of return and is agreeable.    Final Discharge Note (most recent)       Final Note - 09/01/23 0839          Post-Acute Status    Post-Acute Authorization Other                   No future appointments.     BP (!) 108/59 (BP Location: Left arm, Patient Position: Lying)   Pulse 61   Temp 98.2 °F (36.8 °C) (Oral)   Resp 17   Ht 6' 2" (1.88 m)   Wt 67.6 kg (149 lb)   SpO2 100%   BMI 19.13 kg/m²        Medication List        START taking these medications      amoxicillin-clavulanate 875-125mg 875-125 mg per tablet  Commonly known as: AUGMENTIN  Take 1 tablet by mouth every 12 (twelve) hours. for 1 day  Start taking on: September 2, 2023            CONTINUE taking these medications      amLODIPine 2.5 MG tablet  Commonly known as: NORVASC     aspirin 81 MG EC tablet  Commonly known as: ECOTRIN     atorvastatin 20 MG tablet  Commonly known as: LIPITOR     CALMOSEPTINE 0.44-20.6 % Oint  Generic drug: menthol-zinc oxide     ELIQUIS 5 mg Tab  Generic drug: apixaban     lisinopriL 5 MG tablet  Commonly known as: PRINIVIL,ZESTRIL     povidone-iodine 10 % external solution  Commonly known as: BETADINE     TRULICITY 0.75 mg/0.5 mL pen injector  Generic drug: dulaglutide     VITAMIN C 500 MG tablet  Generic drug: ascorbic acid (vitamin C)     vitamin D 1000 units Tab  Commonly known as: VITAMIN D3               Where to Get Your Medications        These medications were sent to [facility]  [address], [place] [address]      Hours: Mon-Fri, 8a-5:30p Phone: [phone]   amoxicillin-clavulanate 875-125mg 875-125 mg per tablet   "     No orders of the defined types were placed in this encounter.

## 2023-09-01 NOTE — PLAN OF CARE
ARIEL setup ambulance transport for 10:00 am ETA pending.   Facility: [facility].   DYAN Bella will continue to follow .      09/01/23 0931   Post-Acute Status   Post-Acute Authorization Other

## 2023-09-01 NOTE — SUBJECTIVE & OBJECTIVE
Interval History: No adverse events overnight. Was scheduled for discharge yesterday, but facility was unable to complete. He will leave today when transport arrives. Resting comfortably in the meantime. No acute complaints. Still on [place] O2    Review of Systems   Constitutional:  Positive for fatigue.   Respiratory:  Positive for shortness of breath.    Cardiovascular: Negative.    Gastrointestinal: Negative.    Genitourinary: Negative.    Musculoskeletal: Negative.    Neurological:  Positive for weakness.         Objective:     Vital Signs (Most Recent):  Temp: 98.2 °F (36.8 °C) (09/01/23 0751)  Pulse: 61 (09/01/23 0751)  Resp: 17 (09/01/23 0751)  BP: (!) 108/59 (09/01/23 0751)  SpO2: 100 % (09/01/23 0751) Vital Signs (24h Range):  Temp:  [96.3 °F (35.7 °C)-98.2 °F (36.8 °C)] 98.2 °F (36.8 °C)  Pulse:  [61-89] 61  Resp:  [17-20] 17  SpO2:  [86 %-100 %] 100 %  BP: (100-129)/(58-66) 108/59     Weight: 67.6 kg (149 lb)  Body mass index is 19.13 kg/m².    Intake/Output Summary (Last 24 hours) at 9/1/2023 1724  Last data filed at 9/1/2023 0941  Gross per 24 hour   Intake 430 ml   Output --   Net 430 ml         Physical Exam  Vitals and nursing note reviewed.   Constitutional:       General: He is not in acute distress.     Appearance: Normal appearance.   HENT:      Head: Normocephalic and atraumatic.      Nose: Nose normal.   Cardiovascular:      Rate and Rhythm: Normal rate and regular rhythm.      Pulses: Normal pulses.      Heart sounds: Normal heart sounds.   Pulmonary:      Effort: Pulmonary effort is normal.      Breath sounds: Normal breath sounds.   Abdominal:      General: Abdomen is flat. Bowel sounds are normal. There is no distension.      Tenderness: There is no abdominal tenderness.   Skin:     General: Skin is warm and dry.   Neurological:      General: No focal deficit present.      Mental Status: He is oriented to person, place, and time.          Significant Labs: All pertinent labs within the past 24  hours have been reviewed.  Recent Lab Results         09/01/23  0452        Albumin 2.2       Alkaline Phosphatase 64       ALT 37       Anion Gap 5       AST 61       Baso # 0.01       Basophil % 0.1       BILIRUBIN TOTAL 0.5  Comment: For infants and newborns, interpretation of results should be based  on gestational age, weight and in agreement with clinical  observations.    Premature Infant recommended reference ranges:  Up to 24 hours.............<8.0 mg/dL  Up to 48 hours............<12.0 mg/dL  3-5 days..................<15.0 mg/dL  6-29 days.................<15.0 mg/dL         BUN 32       Calcium 8.9       Chloride 103       CO2 29       Creatinine 0.8       Differential Method Automated       eGFR >60       Eos # 0.0       Eosinophil % 0.0       Glucose 161       Gran # (ANC) 8.2       Gran % 80.7       Hematocrit 25.4       Hemoglobin 8.2       Immature Grans (Abs) 0.26  Comment: Mild elevation in immature granulocytes is non specific and   can be seen in a variety of conditions including stress response,   acute inflammation, trauma and pregnancy. Correlation with other   laboratory and clinical findings is essential.         Immature Granulocytes 2.6       Lymph # 1.1       Lymph % 11.2       Magnesium  2.0       MCH 32.9       MCHC 32.3              Mono # 0.6       Mono % 5.4       MPV 10.4       nRBC 0       Phosphorus 3.0       Platelets 259       Potassium 4.3       PROTEIN TOTAL 5.4       RBC 2.49       RDW 17.3       Sodium 137       WBC 10.14               Significant Imaging: I have reviewed all pertinent imaging results/findings within the past 24 hours.

## 2023-09-04 LAB
BACTERIA BLD CULT: NORMAL
BACTERIA BLD CULT: NORMAL

## 2023-09-05 ENCOUNTER — PATIENT OUTREACH (OUTPATIENT)
Dept: ADMINISTRATIVE | Facility: CLINIC | Age: 78
End: 2023-09-05
Payer: MEDICARE

## 2023-09-13 ENCOUNTER — OUTSIDE PLACE OF SERVICE (OUTPATIENT)
Dept: ADMINISTRATIVE | Facility: OTHER | Age: 78
End: 2023-09-13
Payer: MEDICARE

## 2023-09-13 PROCEDURE — 99307 SBSQ NF CARE SF MDM 10: CPT | Mod: ,,, | Performed by: FAMILY MEDICINE

## 2023-09-13 PROCEDURE — 99307 PR NURSING FAC CARE, SUBSEQ, IMPROVING: ICD-10-PCS | Mod: ,,, | Performed by: FAMILY MEDICINE

## 2024-01-24 ENCOUNTER — OUTSIDE PLACE OF SERVICE (OUTPATIENT)
Dept: ADMINISTRATIVE | Facility: OTHER | Age: 79
End: 2024-01-24
Payer: MEDICARE

## 2024-01-24 PROCEDURE — 99308 SBSQ NF CARE LOW MDM 20: CPT | Mod: ,,, | Performed by: FAMILY MEDICINE

## 2024-02-14 ENCOUNTER — HOSPITAL ENCOUNTER (INPATIENT)
Facility: HOSPITAL | Age: 79
LOS: 4 days | DRG: 378 | End: 2024-02-20
Attending: EMERGENCY MEDICINE | Admitting: STUDENT IN AN ORGANIZED HEALTH CARE EDUCATION/TRAINING PROGRAM
Payer: MEDICARE

## 2024-02-14 DIAGNOSIS — D50.0 ANEMIA, BLOOD LOSS: ICD-10-CM

## 2024-02-14 DIAGNOSIS — K92.2 GI BLEED: ICD-10-CM

## 2024-02-14 DIAGNOSIS — R57.1 HYPOVOLEMIC SHOCK: Primary | ICD-10-CM

## 2024-02-14 PROBLEM — D62 ACUTE BLOOD LOSS ANEMIA: Status: ACTIVE | Noted: 2024-02-14

## 2024-02-14 PROBLEM — D64.9 SYMPTOMATIC ANEMIA: Status: ACTIVE | Noted: 2024-02-14

## 2024-02-14 PROBLEM — D64.9 NORMOCYTIC ANEMIA: Status: ACTIVE | Noted: 2024-02-14

## 2024-02-14 PROBLEM — K59.04 CHRONIC IDIOPATHIC CONSTIPATION: Status: ACTIVE | Noted: 2024-02-14

## 2024-02-14 LAB
ABO + RH BLD: NORMAL
ACANTHOCYTES BLD QL SMEAR: PRESENT
ALBUMIN SERPL BCP-MCNC: 2.5 G/DL (ref 3.5–5.2)
ALP SERPL-CCNC: 85 U/L (ref 55–135)
ALT SERPL W/O P-5'-P-CCNC: 10 U/L (ref 10–44)
ANION GAP SERPL CALC-SCNC: 10 MMOL/L (ref 8–16)
ANISOCYTOSIS BLD QL SMEAR: ABNORMAL
ANISOCYTOSIS BLD QL SMEAR: SLIGHT
AST SERPL-CCNC: 23 U/L (ref 10–40)
BASOPHILS # BLD AUTO: 0.05 K/UL (ref 0–0.2)
BASOPHILS NFR BLD: 0 % (ref 0–1.9)
BASOPHILS NFR BLD: 0.3 % (ref 0–1.9)
BILIRUB SERPL-MCNC: 0.7 MG/DL (ref 0.1–1)
BLD GP AB SCN CELLS X3 SERPL QL: NORMAL
BLD PROD TYP BPU: NORMAL
BLOOD UNIT EXPIRATION DATE: NORMAL
BLOOD UNIT TYPE CODE: 6200
BLOOD UNIT TYPE: NORMAL
BUN SERPL-MCNC: 25 MG/DL (ref 8–23)
BURR CELLS BLD QL SMEAR: ABNORMAL
CALCIUM SERPL-MCNC: 9.1 MG/DL (ref 8.7–10.5)
CHLORIDE SERPL-SCNC: 107 MMOL/L (ref 95–110)
CO2 SERPL-SCNC: 21 MMOL/L (ref 23–29)
CODING SYSTEM: NORMAL
CREAT SERPL-MCNC: 1.1 MG/DL (ref 0.5–1.4)
CROSSMATCH INTERPRETATION: NORMAL
DIFFERENTIAL METHOD BLD: ABNORMAL
DIFFERENTIAL METHOD BLD: ABNORMAL
DISPENSE STATUS: NORMAL
EOSINOPHIL # BLD AUTO: 0 K/UL (ref 0–0.5)
EOSINOPHIL NFR BLD: 0 % (ref 0–8)
EOSINOPHIL NFR BLD: 0 % (ref 0–8)
ERYTHROCYTE [DISTWIDTH] IN BLOOD BY AUTOMATED COUNT: 18.1 % (ref 11.5–14.5)
ERYTHROCYTE [DISTWIDTH] IN BLOOD BY AUTOMATED COUNT: 19.9 % (ref 11.5–14.5)
EST. GFR  (NO RACE VARIABLE): >60 ML/MIN/1.73 M^2
ESTIMATED AVG GLUCOSE: 97 MG/DL (ref 68–131)
FERRITIN SERPL-MCNC: 62 NG/ML (ref 20–300)
GLUCOSE SERPL-MCNC: 123 MG/DL (ref 70–110)
HBA1C MFR BLD: 5 % (ref 4–5.6)
HCT VFR BLD AUTO: 24.1 % (ref 40–54)
HCT VFR BLD AUTO: 25.6 % (ref 40–54)
HGB BLD-MCNC: 7.2 G/DL (ref 14–18)
HGB BLD-MCNC: 8 G/DL (ref 14–18)
HYPOCHROMIA BLD QL SMEAR: ABNORMAL
HYPOCHROMIA BLD QL SMEAR: ABNORMAL
IMM GRANULOCYTES # BLD AUTO: 0.34 K/UL (ref 0–0.04)
IMM GRANULOCYTES # BLD AUTO: ABNORMAL K/UL (ref 0–0.04)
IMM GRANULOCYTES NFR BLD AUTO: 2.1 % (ref 0–0.5)
IMM GRANULOCYTES NFR BLD AUTO: ABNORMAL % (ref 0–0.5)
LYMPHOCYTES # BLD AUTO: 1.3 K/UL (ref 1–4.8)
LYMPHOCYTES NFR BLD: 19 % (ref 18–48)
LYMPHOCYTES NFR BLD: 8.1 % (ref 18–48)
MCH RBC QN AUTO: 26.7 PG (ref 27–31)
MCH RBC QN AUTO: 28.5 PG (ref 27–31)
MCHC RBC AUTO-ENTMCNC: 29.9 G/DL (ref 32–36)
MCHC RBC AUTO-ENTMCNC: 31.3 G/DL (ref 32–36)
MCV RBC AUTO: 89 FL (ref 82–98)
MCV RBC AUTO: 91 FL (ref 82–98)
MONOCYTES # BLD AUTO: 1.1 K/UL (ref 0.3–1)
MONOCYTES NFR BLD: 4 % (ref 4–15)
MONOCYTES NFR BLD: 6.5 % (ref 4–15)
NEUTROPHILS # BLD AUTO: 13.5 K/UL (ref 1.8–7.7)
NEUTROPHILS NFR BLD: 77 % (ref 38–73)
NEUTROPHILS NFR BLD: 83 % (ref 38–73)
NRBC BLD-RTO: 0 /100 WBC
NRBC BLD-RTO: 0 /100 WBC
OB PNL STL: POSITIVE
OVALOCYTES BLD QL SMEAR: ABNORMAL
OVALOCYTES BLD QL SMEAR: ABNORMAL
PLATELET # BLD AUTO: 272 K/UL (ref 150–450)
PLATELET # BLD AUTO: 373 K/UL (ref 150–450)
PLATELET BLD QL SMEAR: ABNORMAL
PLATELET BLD QL SMEAR: ABNORMAL
PMV BLD AUTO: 10.5 FL (ref 9.2–12.9)
PMV BLD AUTO: 9.9 FL (ref 9.2–12.9)
POCT GLUCOSE: 137 MG/DL (ref 70–110)
POIKILOCYTOSIS BLD QL SMEAR: SLIGHT
POIKILOCYTOSIS BLD QL SMEAR: SLIGHT
POLYCHROMASIA BLD QL SMEAR: ABNORMAL
POLYCHROMASIA BLD QL SMEAR: ABNORMAL
POTASSIUM SERPL-SCNC: 5.4 MMOL/L (ref 3.5–5.1)
PROT SERPL-MCNC: 6.5 G/DL (ref 6–8.4)
RBC # BLD AUTO: 2.7 M/UL (ref 4.6–6.2)
RBC # BLD AUTO: 2.81 M/UL (ref 4.6–6.2)
SODIUM SERPL-SCNC: 138 MMOL/L (ref 136–145)
SPECIMEN OUTDATE: NORMAL
STOMATOCYTES BLD QL SMEAR: PRESENT
TARGETS BLD QL SMEAR: ABNORMAL
TRANS ERYTHROCYTES VOL PATIENT: NORMAL ML
WBC # BLD AUTO: 10.31 K/UL (ref 3.9–12.7)
WBC # BLD AUTO: 16.28 K/UL (ref 3.9–12.7)

## 2024-02-14 PROCEDURE — 93010 ELECTROCARDIOGRAM REPORT: CPT | Mod: HCNC,,, | Performed by: INTERNAL MEDICINE

## 2024-02-14 PROCEDURE — G0378 HOSPITAL OBSERVATION PER HR: HCPCS | Mod: HCNC

## 2024-02-14 PROCEDURE — 85007 BL SMEAR W/DIFF WBC COUNT: CPT | Mod: HCNC | Performed by: EMERGENCY MEDICINE

## 2024-02-14 PROCEDURE — 93005 ELECTROCARDIOGRAM TRACING: CPT | Mod: HCNC

## 2024-02-14 PROCEDURE — 83540 ASSAY OF IRON: CPT | Mod: HCNC | Performed by: STUDENT IN AN ORGANIZED HEALTH CARE EDUCATION/TRAINING PROGRAM

## 2024-02-14 PROCEDURE — 82272 OCCULT BLD FECES 1-3 TESTS: CPT | Mod: HCNC | Performed by: EMERGENCY MEDICINE

## 2024-02-14 PROCEDURE — 83036 HEMOGLOBIN GLYCOSYLATED A1C: CPT | Mod: HCNC | Performed by: FAMILY MEDICINE

## 2024-02-14 PROCEDURE — 86920 COMPATIBILITY TEST SPIN: CPT | Mod: HCNC | Performed by: EMERGENCY MEDICINE

## 2024-02-14 PROCEDURE — 30233N1 TRANSFUSION OF NONAUTOLOGOUS RED BLOOD CELLS INTO PERIPHERAL VEIN, PERCUTANEOUS APPROACH: ICD-10-PCS | Performed by: EMERGENCY MEDICINE

## 2024-02-14 PROCEDURE — C9113 INJ PANTOPRAZOLE SODIUM, VIA: HCPCS | Mod: HCNC | Performed by: FAMILY MEDICINE

## 2024-02-14 PROCEDURE — 25500020 PHARM REV CODE 255: Mod: HCNC | Performed by: EMERGENCY MEDICINE

## 2024-02-14 PROCEDURE — 99223 1ST HOSP IP/OBS HIGH 75: CPT | Mod: HCNC,GC,, | Performed by: INTERNAL MEDICINE

## 2024-02-14 PROCEDURE — 99285 EMERGENCY DEPT VISIT HI MDM: CPT | Mod: 25,HCNC

## 2024-02-14 PROCEDURE — 85025 COMPLETE CBC W/AUTO DIFF WBC: CPT | Mod: HCNC | Performed by: FAMILY MEDICINE

## 2024-02-14 PROCEDURE — 63600175 PHARM REV CODE 636 W HCPCS: Mod: HCNC | Performed by: FAMILY MEDICINE

## 2024-02-14 PROCEDURE — P9021 RED BLOOD CELLS UNIT: HCPCS | Mod: HCNC | Performed by: EMERGENCY MEDICINE

## 2024-02-14 PROCEDURE — 82607 VITAMIN B-12: CPT | Mod: HCNC | Performed by: STUDENT IN AN ORGANIZED HEALTH CARE EDUCATION/TRAINING PROGRAM

## 2024-02-14 PROCEDURE — 86850 RBC ANTIBODY SCREEN: CPT | Mod: HCNC | Performed by: EMERGENCY MEDICINE

## 2024-02-14 PROCEDURE — 80053 COMPREHEN METABOLIC PANEL: CPT | Mod: HCNC | Performed by: EMERGENCY MEDICINE

## 2024-02-14 PROCEDURE — 85027 COMPLETE CBC AUTOMATED: CPT | Mod: HCNC | Performed by: EMERGENCY MEDICINE

## 2024-02-14 PROCEDURE — 82728 ASSAY OF FERRITIN: CPT | Mod: HCNC | Performed by: STUDENT IN AN ORGANIZED HEALTH CARE EDUCATION/TRAINING PROGRAM

## 2024-02-14 RX ORDER — LORAZEPAM 1 MG/1
TABLET ORAL
COMMUNITY
End: 2024-02-14

## 2024-02-14 RX ORDER — SODIUM CHLORIDE, SODIUM LACTATE, POTASSIUM CHLORIDE, CALCIUM CHLORIDE 600; 310; 30; 20 MG/100ML; MG/100ML; MG/100ML; MG/100ML
INJECTION, SOLUTION INTRAVENOUS CONTINUOUS
Status: DISCONTINUED | OUTPATIENT
Start: 2024-02-14 | End: 2024-02-20 | Stop reason: HOSPADM

## 2024-02-14 RX ORDER — IBUPROFEN 200 MG
24 TABLET ORAL
Status: DISCONTINUED | OUTPATIENT
Start: 2024-02-14 | End: 2024-02-20 | Stop reason: HOSPADM

## 2024-02-14 RX ORDER — HYDROCODONE BITARTRATE AND ACETAMINOPHEN 5; 325 MG/1; MG/1
TABLET ORAL
COMMUNITY
End: 2024-02-14

## 2024-02-14 RX ORDER — FERROUS SULFATE 324(65)MG
324 TABLET, DELAYED RELEASE (ENTERIC COATED) ORAL 2 TIMES DAILY
COMMUNITY

## 2024-02-14 RX ORDER — GLUCAGON 1 MG
1 KIT INJECTION
Status: DISCONTINUED | OUTPATIENT
Start: 2024-02-14 | End: 2024-02-20 | Stop reason: HOSPADM

## 2024-02-14 RX ORDER — METFORMIN HYDROCHLORIDE 500 MG/1
TABLET ORAL
COMMUNITY
End: 2024-02-14

## 2024-02-14 RX ORDER — SPIRONOLACTONE 25 MG/1
TABLET ORAL
COMMUNITY
End: 2024-02-14

## 2024-02-14 RX ORDER — INSULIN ASPART 100 [IU]/ML
0-5 INJECTION, SOLUTION INTRAVENOUS; SUBCUTANEOUS
Status: DISCONTINUED | OUTPATIENT
Start: 2024-02-14 | End: 2024-02-20 | Stop reason: HOSPADM

## 2024-02-14 RX ORDER — OMEPRAZOLE 40 MG/1
40 CAPSULE, DELAYED RELEASE ORAL 2 TIMES DAILY
COMMUNITY

## 2024-02-14 RX ORDER — DOCUSATE SODIUM 100 MG/1
100 CAPSULE, LIQUID FILLED ORAL 2 TIMES DAILY
COMMUNITY

## 2024-02-14 RX ORDER — PANTOPRAZOLE SODIUM 40 MG/10ML
40 INJECTION, POWDER, LYOPHILIZED, FOR SOLUTION INTRAVENOUS 2 TIMES DAILY
Status: DISCONTINUED | OUTPATIENT
Start: 2024-02-14 | End: 2024-02-20 | Stop reason: HOSPADM

## 2024-02-14 RX ORDER — IBUPROFEN 200 MG
16 TABLET ORAL
Status: DISCONTINUED | OUTPATIENT
Start: 2024-02-14 | End: 2024-02-20 | Stop reason: HOSPADM

## 2024-02-14 RX ADMIN — IOHEXOL 130 ML: 350 INJECTION, SOLUTION INTRAVENOUS at 01:02

## 2024-02-14 RX ADMIN — PANTOPRAZOLE SODIUM 40 MG: 40 INJECTION, POWDER, LYOPHILIZED, FOR SOLUTION INTRAVENOUS at 02:02

## 2024-02-14 RX ADMIN — SODIUM CHLORIDE, POTASSIUM CHLORIDE, SODIUM LACTATE AND CALCIUM CHLORIDE 1000 ML: 600; 310; 30; 20 INJECTION, SOLUTION INTRAVENOUS at 02:02

## 2024-02-14 RX ADMIN — PANTOPRAZOLE SODIUM 40 MG: 40 INJECTION, POWDER, LYOPHILIZED, FOR SOLUTION INTRAVENOUS at 09:02

## 2024-02-14 NOTE — ED NOTES
Patient incontinent of urine and stool. Stool noted to be brown in color and formed. Patient cleansed and assisted into new brief. Repositioned on ED stretcher. Vitally stable. Safety intact. Call light in reach. Care ongoing.

## 2024-02-14 NOTE — ED NOTES
Updated patient son, Axel, on plan of care and admission with patient okay. Requesting a call when patient moved to a room. [phone]. Patient provided with food. Sitting up and eating at this time. Denies needs. Safety intact. Call light in reach. Care ongoing.

## 2024-02-14 NOTE — ED NOTES
EMS attempted for IV 5x prior to arrival. Charge RN attempted multiple times with u/s. Pt difficult stick. 20 G R upper arm IV successful, however unable to obtain second IV. EDP aware.

## 2024-02-14 NOTE — PT/OT/SLP PROGRESS
Physical Therapy Missed Evaluation      Patient Name:  Axel Burgos   MRN:  [id number]    Patient not seen today secondary to Testing/imaging (xray/CT/MRI) and awaiting H&P in chart prior to initiating proper PT evaluation.     2/14/24

## 2024-02-14 NOTE — ASSESSMENT & PLAN NOTE
"Patient's FSGs are controlled on current medication regimen.  Last A1c reviewed-   Lab Results   Component Value Date    HGBA1C 5.2 08/30/2023     Most recent fingerstick glucose reviewed- No results for input(s): "POCTGLUCOSE" in the last 24 hours.  Current correctional scale  Low  Maintain anti-hyperglycemic dose as follows-   Antihyperglycemics (From admission, onward)      Start     Stop Route Frequency Ordered    02/14/24 1507  insulin aspart U-100 pen 0-5 Units         -- SubQ Before meals & nightly PRN 02/14/24 1407          Hold Oral hypoglycemics while patient is in the hospital- not on home oral antiglycemia  "

## 2024-02-14 NOTE — ASSESSMENT & PLAN NOTE
Positive stool occult blood  Hold long term oral anticoagulant  Trend H/H   NPO  Trend to keep Hgb> 7  Protonix IV  Consult GI

## 2024-02-14 NOTE — SUBJECTIVE & OBJECTIVE
Past Medical History:   Diagnosis Date    Anticoagulant long-term use     Diabetes mellitus     Hypertension     Stroke     Varicose veins        Past Surgical History:   Procedure Laterality Date    VARICOSE VEIN SURGERY         Review of patient's allergies indicates:  No Known Allergies    No current facility-administered medications on file prior to encounter.     Current Outpatient Medications on File Prior to Encounter   Medication Sig    amLODIPine (NORVASC) 2.5 MG tablet Take 2.5 mg by mouth once daily.    apixaban (ELIQUIS) 5 mg Tab Take 5 mg by mouth 2 (two) times daily.    ascorbic acid, vitamin C, (VITAMIN C) 500 MG tablet Take 500 mg by mouth once daily.    aspirin (ECOTRIN) 81 MG EC tablet Take 81 mg by mouth once daily.    atorvastatin (LIPITOR) 20 MG tablet Take 20 mg by mouth once daily.    dulaglutide (TRULICITY) 0.75 mg/0.5 mL pen injector Inject 0.75 mg into the skin every 7 days.    lisinopriL (PRINIVIL,ZESTRIL) 5 MG tablet Take 5 mg by mouth once daily.    menthol-zinc oxide (CALMOSEPTINE) 0.44-20.6 % Oint Apply topically as needed (redness/irritation). Apply to buttocks/groin    povidone-iodine (BETADINE) 10 % external solution Apply topically 3 (three) times a week. Paint scabs/wounds to left foot and allow to air dry    vitamin D (VITAMIN D3) 1000 units Tab Take 1,000 Units by mouth once daily.     Family History    None       Tobacco Use    Smoking status: Never    Smokeless tobacco: Never   Substance and Sexual Activity    Alcohol use: No    Drug use: No    Sexual activity: Never     Review of Systems   Constitutional:  Positive for activity change. Negative for chills.   Eyes:  Negative for photophobia and visual disturbance.   Respiratory:  Negative for apnea and shortness of breath.    Gastrointestinal:  Positive for blood in stool. Negative for rectal pain and vomiting.   Genitourinary:  Negative for dysuria and urgency.   Musculoskeletal:  Negative for arthralgias and back pain.  "  Skin:  Negative for color change and wound.   Hematological:  Bruises/bleeds easily.   Psychiatric/Behavioral:  Negative for confusion.      Objective:     Vital Signs (Most Recent):  Temp: 98.2 °F (36.8 °C) (02/14/24 1200)  Pulse: 101 (02/14/24 1336)  Resp: 14 (02/14/24 1336)  BP: (!) 100/59 (02/14/24 1336)  SpO2: 100 % (02/14/24 1336) Vital Signs (24h Range):  Temp:  [97.5 °F (36.4 °C)-98.2 °F (36.8 °C)] 98.2 °F (36.8 °C)  Pulse:  [] 101  Resp:  [11-24] 14  SpO2:  [95 %-100 %] 100 %  BP: ()/(46-85) 100/59        There is no height or weight on file to calculate BMI.     Physical Exam  Constitutional:       Appearance: Normal appearance.      Comments: Thin and frail   HENT:      Head: Normocephalic and atraumatic.      Nose: Nose normal.   Eyes:      Extraocular Movements: Extraocular movements intact.      Pupils: Pupils are equal, round, and reactive to light.   Cardiovascular:      Rate and Rhythm: Tachycardia present.      Heart sounds:      No gallop.   Pulmonary:      Effort: Pulmonary effort is normal.      Breath sounds: No wheezing or rhonchi.   Abdominal:      General: Abdomen is flat. Bowel sounds are normal.   Musculoskeletal:      Cervical back: Normal range of motion.      Right lower leg: No edema.      Left lower leg: No edema.   Skin:     Capillary Refill: Capillary refill takes less than 2 seconds.      Findings: Bruising present.   Neurological:      Mental Status: He is alert and oriented to person, place, and time.   Psychiatric:         Mood and Affect: Mood normal.              CRANIAL NERVES     CN III, IV, VI   Pupils are equal, round, and reactive to light.       Significant Labs: A1C:   Recent Labs   Lab 08/30/23  0250   HGBA1C 5.2     ABGs: No results for input(s): "PH", "PCO2", "HCO3", "POCSATURATED", "BE", "TOTALHB", "COHB", "METHB", "O2HB", "POCFIO2", "PO2" in the last 48 hours.  Blood Culture: No results for input(s): "LABBLOO" in the last 48 hours.  CBC:   Recent Labs " "  Lab 02/14/24  0751   WBC 10.31   HGB 7.2*   HCT 24.1*        CMP:   Recent Labs   Lab 02/14/24  0751      K 5.4*      CO2 21*   *   BUN 25*   CREATININE 1.1   CALCIUM 9.1   PROT 6.5   ALBUMIN 2.5*   BILITOT 0.7   ALKPHOS 85   AST 23   ALT 10   ANIONGAP 10     Cardiac Markers: No results for input(s): "CKMB", "MYOGLOBIN", "BNP", "TROPISTAT" in the last 48 hours.  Lactic Acid: No results for input(s): "LACTATE" in the last 48 hours.  Lipase: No results for input(s): "LIPASE" in the last 48 hours.  Lipid Panel: No results for input(s): "CHOL", "HDL", "LDLCALC", "TRIG", "CHOLHDL" in the last 48 hours.  Magnesium: No results for input(s): "MG" in the last 48 hours.  Troponin: No results for input(s): "TROPONINI", "TROPONINIHS" in the last 48 hours.  TSH:   Recent Labs   Lab 08/30/23  0250   TSH 0.884     Urine Culture: No results for input(s): "LABURIN" in the last 48 hours.  Urine Studies: No results for input(s): "COLORU", "APPEARANCEUA", "PHUR", "SPECGRAV", "PROTEINUA", "GLUCUA", "KETONESU", "BILIRUBINUA", "OCCULTUA", "NITRITE", "UROBILINOGEN", "LEUKOCYTESUR", "RBCUA", "WBCUA", "BACTERIA", "SQUAMEPITHEL", "HYALINECASTS" in the last 48 hours.    Invalid input(s): "WRIGHTSUR"    Significant Imaging: I have reviewed all pertinent imaging results/findings within the past 24 hours.  "

## 2024-02-14 NOTE — H&P
[facility]  [facility] Medicine  History & Physical    Patient Name: Axel Burgos  MRN: [id number]  Patient Class: OP- Observation  Admission Date: 2/14/2024  Attending Physician: Eulalia Cornejo MD  Primary Care Provider: Matty Patel MD         Patient information was obtained from patient and ER records.     Subjective:     Principal Problem:GI bleed    Chief Complaint:   Chief Complaint   Patient presents with    GI Bleeding     [facility] c/o lower gi bleed onset yesterday bp 80/42        HPI: Axel Burgos is a 78 y.o. male with  PMHx of DM2, CVA x2 on apixaban and ASA, HTN, and varicose veins presents to [facility] ED via EMS from [facility] for symptomatic anemia (hgb 6.0) and positive occult blood during routine testing. Pt denies ABD pain, N/V/D/C, CP, SOB, dizziness. States he has been eating, drinking, and taking PO meds w/o issue. ED workup significant for Hgb 7.2, K 5.4, BUN/Cr 25/1.1, CTA ABD shows fecal impaction and non-obstructing nephrolithiasis. Given one unit PRBCs, and IV protonix in ED. BP stable in low 100s. No active bleeding from rectum. Admit hospital medicine and consult GI for evaluation    Past Medical History:   Diagnosis Date    Anticoagulant long-term use     Diabetes mellitus     Hypertension     Stroke     Varicose veins        Past Surgical History:   Procedure Laterality Date    VARICOSE VEIN SURGERY         Review of patient's allergies indicates:  No Known Allergies    No current facility-administered medications on file prior to encounter.     Current Outpatient Medications on File Prior to Encounter   Medication Sig    amLODIPine (NORVASC) 2.5 MG tablet Take 2.5 mg by mouth once daily.    apixaban (ELIQUIS) 5 mg Tab Take 5 mg by mouth 2 (two) times daily.    ascorbic acid, vitamin C, (VITAMIN C) 500 MG tablet Take 500 mg by mouth once daily.    aspirin (ECOTRIN) 81 MG EC tablet Take 81 mg by mouth once  daily.    atorvastatin (LIPITOR) 20 MG tablet Take 20 mg by mouth once daily.    dulaglutide (TRULICITY) 0.75 mg/0.5 mL pen injector Inject 0.75 mg into the skin every 7 days.    lisinopriL (PRINIVIL,ZESTRIL) 5 MG tablet Take 5 mg by mouth once daily.    menthol-zinc oxide (CALMOSEPTINE) 0.44-20.6 % Oint Apply topically as needed (redness/irritation). Apply to buttocks/groin    povidone-iodine (BETADINE) 10 % external solution Apply topically 3 (three) times a week. Paint scabs/wounds to left foot and allow to air dry    vitamin D (VITAMIN D3) 1000 units Tab Take 1,000 Units by mouth once daily.     Family History    None       Tobacco Use    Smoking status: Never    Smokeless tobacco: Never   Substance and Sexual Activity    Alcohol use: No    Drug use: No    Sexual activity: Never     Review of Systems   Constitutional:  Positive for activity change. Negative for chills.   Eyes:  Negative for photophobia and visual disturbance.   Respiratory:  Negative for apnea and shortness of breath.    Gastrointestinal:  Positive for blood in stool. Negative for rectal pain and vomiting.   Genitourinary:  Negative for dysuria and urgency.   Musculoskeletal:  Negative for arthralgias and back pain.   Skin:  Negative for color change and wound.   Hematological:  Bruises/bleeds easily.   Psychiatric/Behavioral:  Negative for confusion.      Objective:     Vital Signs (Most Recent):  Temp: 98.2 °F (36.8 °C) (02/14/24 1200)  Pulse: 101 (02/14/24 1336)  Resp: 14 (02/14/24 1336)  BP: (!) 100/59 (02/14/24 1336)  SpO2: 100 % (02/14/24 1336) Vital Signs (24h Range):  Temp:  [97.5 °F (36.4 °C)-98.2 °F (36.8 °C)] 98.2 °F (36.8 °C)  Pulse:  [] 101  Resp:  [11-24] 14  SpO2:  [95 %-100 %] 100 %  BP: ()/(46-85) 100/59        There is no height or weight on file to calculate BMI.     Physical Exam  Constitutional:       Appearance: Normal appearance.      Comments: Thin and frail   HENT:      Head: Normocephalic and atraumatic.      " Nose: Nose normal.   Eyes:      Extraocular Movements: Extraocular movements intact.      Pupils: Pupils are equal, round, and reactive to light.   Cardiovascular:      Rate and Rhythm: Tachycardia present.      Heart sounds:      No gallop.   Pulmonary:      Effort: Pulmonary effort is normal.      Breath sounds: No wheezing or rhonchi.   Abdominal:      General: Abdomen is flat. Bowel sounds are normal.   Musculoskeletal:      Cervical back: Normal range of motion.      Right lower leg: No edema.      Left lower leg: No edema.   Skin:     Capillary Refill: Capillary refill takes less than 2 seconds.      Findings: Bruising present.   Neurological:      Mental Status: He is alert and oriented to person, place, and time.   Psychiatric:         Mood and Affect: Mood normal.              CRANIAL NERVES     CN III, IV, VI   Pupils are equal, round, and reactive to light.       Significant Labs: A1C:   Recent Labs   Lab 08/30/23  0250   HGBA1C 5.2     ABGs: No results for input(s): "PH", "PCO2", "HCO3", "POCSATURATED", "BE", "TOTALHB", "COHB", "METHB", "O2HB", "POCFIO2", "PO2" in the last 48 hours.  Blood Culture: No results for input(s): "LABBLOO" in the last 48 hours.  CBC:   Recent Labs   Lab 02/14/24  0751   WBC 10.31   HGB 7.2*   HCT 24.1*        CMP:   Recent Labs   Lab 02/14/24  0751      K 5.4*      CO2 21*   *   BUN 25*   CREATININE 1.1   CALCIUM 9.1   PROT 6.5   ALBUMIN 2.5*   BILITOT 0.7   ALKPHOS 85   AST 23   ALT 10   ANIONGAP 10     Cardiac Markers: No results for input(s): "CKMB", "MYOGLOBIN", "BNP", "TROPISTAT" in the last 48 hours.  Lactic Acid: No results for input(s): "LACTATE" in the last 48 hours.  Lipase: No results for input(s): "LIPASE" in the last 48 hours.  Lipid Panel: No results for input(s): "CHOL", "HDL", "LDLCALC", "TRIG", "CHOLHDL" in the last 48 hours.  Magnesium: No results for input(s): "MG" in the last 48 hours.  Troponin: No results for input(s): " ""TROPONINI", "TROPONINIHS" in the last 48 hours.  TSH:   Recent Labs   Lab 08/30/23  0250   TSH 0.884     Urine Culture: No results for input(s): "LABURIN" in the last 48 hours.  Urine Studies: No results for input(s): "COLORU", "APPEARANCEUA", "PHUR", "SPECGRAV", "PROTEINUA", "GLUCUA", "KETONESU", "BILIRUBINUA", "OCCULTUA", "NITRITE", "UROBILINOGEN", "LEUKOCYTESUR", "RBCUA", "WBCUA", "BACTERIA", "SQUAMEPITHEL", "HYALINECASTS" in the last 48 hours.    Invalid input(s): "WRIGHTSUR"    Significant Imaging: I have reviewed all pertinent imaging results/findings within the past 24 hours.  Assessment/Plan:     * GI bleed  Positive stool occult blood  Hold long term oral anticoagulant  Trend H/H   NPO  Trend to keep Hgb> 7  Protonix IV  Consult GI        Acute blood loss anemia  Anemia of chronic disease  Symptomatic anemia  Hypotensive on admit    Patient's anemia is currently uncontrolled. Has received 1 units of PRBCs on 2/4 in the ED on arrival . Etiology likely d/t acute blood loss which was from GI bleed  Current CBC reviewed-   Lab Results   Component Value Date    HGB 7.2 (L) 02/14/2024    HCT 24.1 (L) 02/14/2024     Monitor serial CBC and transfuse if patient becomes hemodynamically unstable, symptomatic or H/H drops below 7/21.    Anticoagulant long-term use  This patient has long term use on an anticoagulant with Select Anticoagulant(s): Direct oral anticoagulant: Apixaban (Eliquis). Their long term anticoagulation will be Held or Continued: held. They are on long term anticoagulation due to Reason for Anticoagulation: Stroke prevention.   Hold due to GI bleed    Type 2 diabetes mellitus, controlled  Patient's FSGs are controlled on current medication regimen.  Last A1c reviewed-   Lab Results   Component Value Date    HGBA1C 5.2 08/30/2023     Most recent fingerstick glucose reviewed- No results for input(s): "POCTGLUCOSE" in the last 24 hours.  Current correctional scale  Low  Maintain anti-hyperglycemic dose " as follows-   Antihyperglycemics (From admission, onward)      Start     Stop Route Frequency Ordered    02/14/24 1507  insulin aspart U-100 pen 0-5 Units         -- SubQ Before meals & nightly PRN 02/14/24 1407          Hold Oral hypoglycemics while patient is in the hospital- not on home oral antiglycemia    Essential hypertension  Chronic, uncontrolled. Latest blood pressure and vitals reviewed-     Temp:  [97.5 °F (36.4 °C)-98.2 °F (36.8 °C)]   Pulse:  []   Resp:  [11-24]   BP: ()/(46-85)   SpO2:  [95 %-100 %] .   Home meds for hypertension were reviewed and noted below.   Hypertension Medications               amLODIPine (NORVASC) 2.5 MG tablet Take 2.5 mg by mouth once daily.    lisinopriL (PRINIVIL,ZESTRIL) 5 MG tablet Take 5 mg by mouth once daily.            While in the hospital, will manage blood pressure as follows; Adjust home antihypertensive regimen as follows- hold due to hypotension on admit      Chronic venous insufficiency  chronic        VTE Risk Mitigation (From admission, onward)           Ordered     IP VTE HIGH RISK PATIENT  Once         02/14/24 1407     Place sequential compression device  Until discontinued         02/14/24 1407                       On 02/14/2024, patient should be placed in hospital observation services under my care.             Eulalia Cornejo MD  Department of Hospital Medicine  [place] - Emergency Dept

## 2024-02-14 NOTE — ASSESSMENT & PLAN NOTE
Anemia of chronic disease  Symptomatic anemia  Hypotensive on admit    Patient's anemia is currently uncontrolled. Has received 1 units of PRBCs on 2/4 in the ED on arrival . Etiology likely d/t acute blood loss which was from GI bleed  Current CBC reviewed-   Lab Results   Component Value Date    HGB 7.2 (L) 02/14/2024    HCT 24.1 (L) 02/14/2024     Monitor serial CBC and transfuse if patient becomes hemodynamically unstable, symptomatic or H/H drops below 7/21.

## 2024-02-14 NOTE — ASSESSMENT & PLAN NOTE
This patient has long term use on an anticoagulant with Select Anticoagulant(s): Direct oral anticoagulant: Apixaban (Eliquis). Their long term anticoagulation will be Held or Continued: held. They are on long term anticoagulation due to Reason for Anticoagulation: Stroke prevention.   Hold due to GI bleed

## 2024-02-14 NOTE — ASSESSMENT & PLAN NOTE
Chronic anemia that has slowly progressed since 2015  No signs of acute blood loss, brown stool, CTA negative    Ordered anemia profile   Discussed EGD/Colonoscopy today, patient would like to avoid bowel prep, willing to consider EGD as inpatient   NPO at midnight, tentative plan for EGD tomorrow if patient amendable

## 2024-02-14 NOTE — SUBJECTIVE & OBJECTIVE
Past Medical History:   Diagnosis Date    Anticoagulant long-term use     Diabetes mellitus     Hypertension     Stroke     Varicose veins        Past Surgical History:   Procedure Laterality Date    VARICOSE VEIN SURGERY         Review of patient's allergies indicates:  No Known Allergies  Family History    None       Tobacco Use    Smoking status: Never    Smokeless tobacco: Never   Substance and Sexual Activity    Alcohol use: No    Drug use: No    Sexual activity: Never     Review of Systems   Constitutional:  Positive for fatigue. Negative for appetite change and fever.   HENT:  Negative for mouth sores, nosebleeds and trouble swallowing.    Respiratory:  Negative for chest tightness and shortness of breath.    Cardiovascular:  Negative for chest pain.   Gastrointestinal:  Positive for constipation. Negative for abdominal pain, blood in stool, diarrhea and nausea.   All other systems reviewed and are negative.    Objective:     Vital Signs (Most Recent):  Temp: 98.2 °F (36.8 °C) (02/14/24 1200)  Pulse: 97 (02/14/24 1646)  Resp: 17 (02/14/24 1646)  BP: 112/65 (02/14/24 1646)  SpO2: 100 % (02/14/24 1646) Vital Signs (24h Range):  Temp:  [97.5 °F (36.4 °C)-98.2 °F (36.8 °C)] 98.2 °F (36.8 °C)  Pulse:  [] 97  Resp:  [11-24] 17  SpO2:  [95 %-100 %] 100 %  BP: ()/(46-85) 112/65        There is no height or weight on file to calculate BMI.    No intake or output data in the 24 hours ending 02/14/24 1654    Lines/Drains/Airways       Peripheral Intravenous Line  Duration                  Peripheral IV - Single Lumen 02/14/24 1305 20 G Right;Lateral Antecubital <1 day                     Physical Exam  Vitals and nursing note reviewed.   Constitutional:       General: He is not in acute distress.     Appearance: Normal appearance.   HENT:      Head: Normocephalic and atraumatic.      Nose: Nose normal. No congestion or rhinorrhea.      Mouth/Throat:      Mouth: Mucous membranes are dry.      Pharynx:  Oropharynx is clear.   Eyes:      General: No scleral icterus.     Extraocular Movements: Extraocular movements intact.      Conjunctiva/sclera: Conjunctivae normal.   Cardiovascular:      Rate and Rhythm: Normal rate and regular rhythm.   Pulmonary:      Effort: Pulmonary effort is normal. No respiratory distress.      Breath sounds: No wheezing.   Abdominal:      General: There is no distension.      Palpations: Abdomen is soft.      Tenderness: There is no abdominal tenderness. There is no guarding.   Musculoskeletal:      Cervical back: Normal range of motion and neck supple.   Skin:     General: Skin is warm and dry.   Neurological:      General: No focal deficit present.      Mental Status: He is alert. Mental status is at baseline.          Significant Labs:  CBC:   Recent Labs   Lab 02/14/24  0751   WBC 10.31   HGB 7.2*   HCT 24.1*        CMP:   Recent Labs   Lab 02/14/24  0751   *   CALCIUM 9.1   ALBUMIN 2.5*   PROT 6.5      K 5.4*   CO2 21*      BUN 25*   CREATININE 1.1   ALKPHOS 85   ALT 10   AST 23   BILITOT 0.7       Significant Imaging:  Imaging results within the past 24 hours have been reviewed. CT angio A/P with no extravasation. Lady in rectum, potential stercoral colitis

## 2024-02-14 NOTE — ED PROVIDER NOTES
Encounter Date: 2/14/2024       History     Chief Complaint   Patient presents with    GI Bleeding     Pt presents to ED today via [facility] from [facility] home c/o lower gi bleed onset yesterday bp 80/42     HPI  This is a 78 y.o. male who presents to the Emergency Department with lower GI bleed.  Patient presents from [facility].  Patient denies any complaints including stomach pain, nausea, chest pain, shortness of breath, headache, dizziness. Pt reports being hungry.    EMS provides additional history at bedside.  States that nursing home reported hemoglobin in the 6's yesterday, occult blood positive stool, blood pressure in the 60-70's systolic today. EMS states BP for them was in the 80's systolic.     Review of patient's allergies indicates:  No Known Allergies  Past Medical History:   Diagnosis Date    Anticoagulant long-term use     Diabetes mellitus     Hypertension     Stroke     Varicose veins      Past Surgical History:   Procedure Laterality Date    VARICOSE VEIN SURGERY       No family history on file.  Social History     Tobacco Use    Smoking status: Never    Smokeless tobacco: Never   Substance Use Topics    Alcohol use: No    Drug use: No     Review of Systems   Constitutional:  Negative for activity change and appetite change.   Respiratory:  Negative for shortness of breath.    Cardiovascular:  Negative for chest pain.   Gastrointestinal:  Negative for abdominal pain and nausea.   Neurological:  Negative for dizziness and headaches.       Physical Exam     Initial Vitals [02/14/24 0718]   BP Pulse Resp Temp SpO2   (!) 82/46 97 17 97.5 °F (36.4 °C) 100 %      MAP       --         Physical Exam    Nursing note and vitals reviewed.  Constitutional: He appears well-developed and well-nourished. He is not diaphoretic. No distress.   Elderly appearing male   HENT:   Head: Normocephalic and atraumatic.   Dry mucous membranes   Eyes:   Pale conjunctiva   Cardiovascular:  Regular rhythm.            Tachycardic  Radial pulses palpable bilaterally  DP pulses nonpalpable   Pulmonary/Chest: Breath sounds normal. No respiratory distress. He has no wheezes. He has no rales.   Abdominal: Abdomen is soft. Bowel sounds are normal. He exhibits no distension. There is no abdominal tenderness. There is no rebound and no guarding.   Musculoskeletal:         General: No tenderness. Normal range of motion.     Neurological: He is alert. GCS score is 15. GCS eye subscore is 4. GCS verbal subscore is 5. GCS motor subscore is 6.   Oriented x2   Skin: Skin is dry. Capillary refill takes more than 3 seconds. No rash noted. There is pallor.   Psychiatric: He has a normal mood and affect.         ED Course   Procedures  Labs Reviewed   CBC W/ AUTO DIFFERENTIAL - Abnormal; Notable for the following components:       Result Value    RBC 2.70 (*)     Hemoglobin 7.2 (*)     Hematocrit 24.1 (*)     MCH 26.7 (*)     MCHC 29.9 (*)     RDW 19.9 (*)     Gran % 77.0 (*)     Platelet Estimate Increased (*)     All other components within normal limits   COMPREHENSIVE METABOLIC PANEL - Abnormal; Notable for the following components:    Potassium 5.4 (*)     CO2 21 (*)     Glucose 123 (*)     BUN 25 (*)     Albumin 2.5 (*)     All other components within normal limits   OCCULT BLOOD X 1, STOOL - Abnormal; Notable for the following components:    Occult Blood Positive (*)     All other components within normal limits   HEMOGLOBIN A1C   TYPE & SCREEN   POCT GLUCOSE MONITORING CONTINUOUS   PREPARE RBC SOFT     EKG Readings: (Independently Interpreted)   Initial Reading: No STEMI. Rhythm: Sinus Tachycardia. Heart Rate: 102. Ectopy: No Ectopy. Conduction: Bifasicular. ST Segments: Normal ST Segments. T Waves: Normal. Clinical Impression: Normal Sinus Rhythm     ECG Results              EKG 12-lead (In process)        Collection Time Result Time QRS Duration OHS QTC Calculation    02/14/24 07:50:32 02/14/24 09:50:53 126 484                     In  process by Interface, Lab In [facility] (02/14/24 09:51:01)                   Narrative:    Test Reason : K92.2,    Vent. Rate : 102 BPM     Atrial Rate : 102 BPM     P-R Int : 200 ms          QRS Dur : 126 ms      QT Int : 372 ms       P-R-T Axes : 069 -70 097 degrees     QTc Int : 484 ms    Sinus tachycardia  Right bundle branch block  Left anterior fascicular block   Bifascicular block   Possible Lateral infarct ,age undetermined  Abnormal ECG  When compared with ECG of 30-AUG-2023 09:10,  Aberrant conduction is no longer Present  Borderline criteria for Lateral infarct are now Present  Nonspecific T wave abnormality no longer evident in Inferior leads    Referred By: [email or web address]   SELF           Confirmed By:                                   Imaging Results               CTA Acute GI [place], Abdomen and Pelvis (Final result)  Result time 02/14/24 13:35:21      Final result by Roderick Ramos III, MD (02/14/24 13:35:21)                   Impression:      No active extravasation or pooling of contrast is seen within the GI tract.    Four calculi right renal pelvis and 2 calculi in left renal pelvis.    Mid ureter calculus on the left.  Left renal collecting system demonstrates hydronephrosis.  The right side shows no hydronephrosis.    Small hiatal hernia.    Constipation, fecal impaction, possible stercoral colitis.    Diverticulosis.    Coronary artery calcifications.    Small amount of pericardial thickening versus fluid.    This report was flagged in Epic as abnormal.      Electronically signed by: Roderick Ramos MD  Date:    02/14/2024  Time:    13:35               Narrative:    EXAMINATION:  CTA ACUTE GI BLEED, ABDOMEN AND PELVIS    CLINICAL HISTORY:  GI Bleed, anemia;    FINDINGS:  Comparison is none.  Patient was administered 130 cc of Omnipaque 3 intravenously.    On arterial phase images no active extravasation of contrast is seen into the gastrointestinal tract.  On the 3 minute delay images there is no  pooling contrast within the GI tract.  Chronic appearing lung changes at the lung bases posteriorly.  There are splenic granulomas.  Liver, biliary tree show nothing unusual.  Stomach, pancreas, and duodenum show nothing unusual.  The adrenal glands are not enlarged.  There is a small hiatal hernia.  There are few small renal lesions most likely cysts.  There are 4 calculi right UPJ measuring up to 9 mm.  There are 2 calculi left renal pelvis.  There is mild left-sided hydronephrosis.  There is no right-sided hydronephrosis.  No obstruction, ileus, or perforation seen.  There is a mid left ureter calculus measuring 4.6 mm.  There are calculi layering in the bladder.  There is fecal impaction there may be stercoral colitis.  There is mild diverticulosis.  There are coronary artery calcifications.  There are vascular calcifications.  There is a small amount of pericardial thickening.  No para-aortic, retroperitoneal or mesenteric adenopathy is seen.  Bones demonstrate DJD.                                       Medications   lactated ringers infusion (has no administration in time range)   pantoprazole injection 40 mg (has no administration in time range)   glucose chewable tablet 16 g (has no administration in time range)   glucose chewable tablet 24 g (has no administration in time range)   glucagon (human recombinant) injection 1 mg (has no administration in time range)   insulin aspart U-100 pen 0-5 Units (has no administration in time range)   dextrose 10% bolus 125 mL 125 mL (has no administration in time range)   dextrose 10% bolus 250 mL 250 mL (has no administration in time range)   iohexoL (OMNIPAQUE 350) injection 130 mL (130 mLs Intravenous Given 2/14/24 1306)     Medical Decision Making  Amount and/or Complexity of Data Reviewed  Labs: ordered. Decision-making details documented in ED Course.  Radiology: ordered and independent interpretation performed.     Details: CTA abdomen/pelvis:  No contrast  extravasation.  Additionally there is no evidence of free air, air-fluid levels  Discussion of management or test interpretation with external provider(s): HELADIO Christian for [facility] , regarding anemia, occult blood + status with hypotension s/p PRBC transfusion. Will admit for further monitoring, cbc's, GI consult. Pt is a full code.    Risk  Prescription drug management.  Decision regarding hospitalization.  Risk Details: As per conversation above. Pt anemic, was hypotensive, tachycardic, hypovolemic shock, resuscitated and will require further monitoring for GI bleed.    Critical Care  Total time providing critical care: 45 minutes (As above, shock, GI bleed)               ED Course as of 02/14/24 1426   Wed Feb 14, 2024   0838 Hemoglobin(!): 7.2 [NP]   0838 Hematocrit(!): 24.1 [NP]   0838 Platelet Count: 373 [NP]   1249 Occult Blood(!): Positive [NP]      ED Course User Index  [NP] Vikash Pennington MD                           Clinical Impression:  Final diagnoses:  [K92.2] GI bleed  [R57.1] Hypovolemic shock (Primary)  [D50.0] Anemia, blood loss          ED Disposition Condition    Observation Stable                Vikash Pennington MD  02/14/24 1429

## 2024-02-14 NOTE — HPI
Axel Burgos is a 78 y.o. male with  PMHx of DM2, CVA x2 on apixaban and ASA, HTN, and varicose veins presents to [facility] ED via EMS from [facility] for symptomatic anemia (hgb 6.0) and positive occult blood during routine testing. Pt denies ABD pain, N/V/D/C, CP, SOB, dizziness. States he has been eating, drinking, and taking PO meds w/o issue. ED workup significant for Hgb 7.2, K 5.4, BUN/Cr 25/1.1, CTA ABD shows fecal impaction and non-obstructing nephrolithiasis. Given one unit PRBCs, and IV protonix in ED. BP stable in low 100s. No active bleeding from rectum. Admit hospital medicine and consult GI for evaluation

## 2024-02-14 NOTE — ASSESSMENT & PLAN NOTE
Chronic, uncontrolled. Latest blood pressure and vitals reviewed-     Temp:  [97.5 °F (36.4 °C)-98.2 °F (36.8 °C)]   Pulse:  []   Resp:  [11-24]   BP: ()/(46-85)   SpO2:  [95 %-100 %] .   Home meds for hypertension were reviewed and noted below.   Hypertension Medications               amLODIPine (NORVASC) 2.5 MG tablet Take 2.5 mg by mouth once daily.    lisinopriL (PRINIVIL,ZESTRIL) 5 MG tablet Take 5 mg by mouth once daily.            While in the hospital, will manage blood pressure as follows; Adjust home antihypertensive regimen as follows- hold due to hypotension on admit

## 2024-02-14 NOTE — ED NOTES
"Patient presents to the ED via EMS with reports of low Hgb and Hct; as well as, hypotension. Patient is from [facility] where apparently blood was collected 2 days ago and resulted a Hgb of 6. EMS reports yesterday, a hemoccult was conducted and positive. Upon arrival, patient is pale and cool. Denies abd pain, lethargy, fatigue, or SOB. States "I feel like I normally do." Patient is alert and oriented to baseline. Pale, moist mucus membranes noted. Pt denies being aware of dark or bloody stool. Pt endorses hx of blood transfusion, "when I was pretty young." Pt does endorse use of blood thinners. Pt has been updated on care plan. Placed on continuous cardiac, BP, and O2 monitoring. Awaiting orders and MD assessment. Care ongoing.   "

## 2024-02-14 NOTE — ED NOTES
Patient incontinent of stool. Stool color noted to be light brown. No obvious bleeding noted. Cleansed and applied new brief. Repositioned patient for comfort. Safety intact. Call light in reach. Nadn. Care ongoing

## 2024-02-14 NOTE — PHARMACY MED REC
"Admission Medication History     The home medication history was taken by Ailyn Mancia CPhT.    Medication history obtained from, [place] Verified    You may go to "Admission" then "Reconcile Home Medications" tabs to review and/or act upon these items.     The home medication list has been updated by the Pharmacy department.   Please read ALL comments highlighted in yellow.   Please address this information as you see fit.    Feel free to contact us if you have any questions or require assistance.      The medications listed below were removed from the home medication list.  Please reorder if appropriate:  Patient reports no longer taking the following medication(s):  Lorazepam 1 mg  Metformin 500 mg  Norco 5-325 mg  Spironolactone 25 mg        Ailyn Mancia CPhT.  Ext [phone]               .          "

## 2024-02-14 NOTE — CONSULTS
[facility] - Emergency Dept  Gastroenterology  Consult Note    Patient Name: Axel Burgos  MRN: [id number]  Admission Date: 2/14/2024  Hospital Length of Stay: 0 days  Code Status: Full Code   Attending Provider: Eulalia Cornejo*   Consulting Provider: Cali Garnica MD  Primary Care Physician: Matty Patel MD  Principal Problem:GI bleed    Inpatient consult to Gastroenterology  Consult performed by: Cali Garnica MD  Consult ordered by: Eulalia Cornejo MD        Subjective:     HPI:  78 year old male with history of CVA, Afib?, on Eliquis and asprin. Presented to ED after found to be anemic on routine labs at NH. Does report some constipation. Stool light brown in ED, BP responded to IVF. Denies N/V, melena, hematochezia, and abdominal pain. CT angio A/P with no extravasation, potential stercoral colitis.  Can not recall any endoscopic history.     Past Medical History:   Diagnosis Date    Anticoagulant long-term use     Diabetes mellitus     Hypertension     Stroke     Varicose veins        Past Surgical History:   Procedure Laterality Date    VARICOSE VEIN SURGERY         Review of patient's allergies indicates:  No Known Allergies  Family History    None       Tobacco Use    Smoking status: Never    Smokeless tobacco: Never   Substance and Sexual Activity    Alcohol use: No    Drug use: No    Sexual activity: Never     Review of Systems   Constitutional:  Positive for fatigue. Negative for appetite change and fever.   HENT:  Negative for mouth sores, nosebleeds and trouble swallowing.    Respiratory:  Negative for chest tightness and shortness of breath.    Cardiovascular:  Negative for chest pain.   Gastrointestinal:  Positive for constipation. Negative for abdominal pain, blood in stool, diarrhea and nausea.   All other systems reviewed and are negative.    Objective:     Vital Signs (Most Recent):  Temp: 98.2 °F (36.8 °C) (02/14/24 1200)  Pulse: 97 (02/14/24 1646)  Resp: 17 (02/14/24  1646)  BP: 112/65 (02/14/24 1646)  SpO2: 100 % (02/14/24 1646) Vital Signs (24h Range):  Temp:  [97.5 °F (36.4 °C)-98.2 °F (36.8 °C)] 98.2 °F (36.8 °C)  Pulse:  [] 97  Resp:  [11-24] 17  SpO2:  [95 %-100 %] 100 %  BP: ()/(46-85) 112/65        There is no height or weight on file to calculate BMI.    No intake or output data in the 24 hours ending 02/14/24 1654    Lines/Drains/Airways       Peripheral Intravenous Line  Duration                  Peripheral IV - Single Lumen 02/14/24 1305 20 G Right;Lateral Antecubital <1 day                     Physical Exam  Vitals and nursing note reviewed.   Constitutional:       General: He is not in acute distress.     Appearance: Normal appearance.   HENT:      Head: Normocephalic and atraumatic.      Nose: Nose normal. No congestion or rhinorrhea.      Mouth/Throat:      Mouth: Mucous membranes are dry.      Pharynx: Oropharynx is clear.   Eyes:      General: No scleral icterus.     Extraocular Movements: Extraocular movements intact.      Conjunctiva/sclera: Conjunctivae normal.   Cardiovascular:      Rate and Rhythm: Normal rate and regular rhythm.   Pulmonary:      Effort: Pulmonary effort is normal. No respiratory distress.      Breath sounds: No wheezing.   Abdominal:      General: There is no distension.      Palpations: Abdomen is soft.      Tenderness: There is no abdominal tenderness. There is no guarding.   Musculoskeletal:      Cervical back: Normal range of motion and neck supple.   Skin:     General: Skin is warm and dry.   Neurological:      General: No focal deficit present.      Mental Status: He is alert. Mental status is at baseline.          Significant Labs:  CBC:   Recent Labs   Lab 02/14/24  0751   WBC 10.31   HGB 7.2*   HCT 24.1*        CMP:   Recent Labs   Lab 02/14/24  0751   *   CALCIUM 9.1   ALBUMIN 2.5*   PROT 6.5      K 5.4*   CO2 21*      BUN 25*   CREATININE 1.1   ALKPHOS 85   ALT 10   AST 23   BILITOT 0.7        Significant Imaging:  Imaging results within the past 24 hours have been reviewed. CT angio A/P with no extravasation. Lady in rectum, potential stercoral colitis  Assessment/Plan:     Oncology  Normocytic anemia  Chronic anemia that has slowly progressed since 2015  No signs of acute blood loss, brown stool, CTA negative    Ordered anemia profile   Discussed EGD/Colonoscopy today, patient would like to avoid bowel prep, willing to consider EGD as inpatient   NPO at midnight, tentative plan for EGD tomorrow if patient amendable    GI  Chronic idiopathic constipation  Start miralax daily, uptitrate to BID if needed         Thank you for your consult. I will follow-up with patient. Please contact us if you have any additional questions.    Cali Garnica MD  Gastroenterology  [place] - Emergency Dept

## 2024-02-14 NOTE — H&P
[place] - Emergency Dept  History & Physical    Subjective:      Chief Complaint/Reason for Admission: Anemia, GIB    Axel Burgos is a 78 y.o. male w/ PMHx of DM2, CVA x2 on apixaban and ASA, HTN, and varicose veins presents to [facility] ED via EMS from [facility] for symptomatic anemia (hgb 6.0) and positive occult blood during routine testing. Pt denies ABD pain, N/V/D/C, CP, SOB, dizziness. States he has been eating, drinking, and taking PO meds w/o issue. ED workup significant for Hgb 7.2, K 5.4, BUN/Cr 25/1.1, CTA ABD shows fecal impaction and non-obstructing nephrolithiasis. Given one unit PRBCs, and IV protonix in ED. BP stable in low 100s. No active bleeding from rectum.    Patient admitted to observation  Past Medical History:   Diagnosis Date    Anticoagulant long-term use     Diabetes mellitus     Hypertension     Stroke     Varicose veins      Past Surgical History:   Procedure Laterality Date    VARICOSE VEIN SURGERY       Social History     Tobacco Use    Smoking status: Never    Smokeless tobacco: Never   Substance Use Topics    Alcohol use: No    Drug use: No       (Not in a hospital admission)    Review of patient's allergies indicates:  No Known Allergies     Information gathered from patient and record sent from NH    Review of Systems   Constitutional: Negative.  Negative for chills, fever and malaise/fatigue.   HENT:  Positive for hearing loss.    Eyes: Negative.    Respiratory:  Negative for cough, hemoptysis, sputum production, shortness of breath and wheezing.    Cardiovascular:  Negative for chest pain, palpitations, orthopnea and leg swelling.   Gastrointestinal:  Negative for abdominal pain, blood in stool, constipation, diarrhea, heartburn, melena, nausea and vomiting.   Genitourinary:  Negative for dysuria, flank pain and hematuria.   Musculoskeletal:  Positive for back pain. Negative for falls.   Skin:         Pain at scattered areas of bruising on BUE   Neurological:  Negative for  dizziness, weakness and headaches.   Endo/Heme/Allergies:  Bruises/bleeds easily.   Psychiatric/Behavioral:  Positive for memory loss.        Objective:      Vital Signs (Most Recent)  Temp: 98.2 °F (36.8 °C) (02/14/24 1200)  Pulse: 101 (02/14/24 1336)  Resp: 14 (02/14/24 1336)  BP: (!) 100/59 (02/14/24 1336)  SpO2: 100 % (02/14/24 1336)    Vital Signs Range (Last 24H):  Temp:  [97.5 °F (36.4 °C)-98.2 °F (36.8 °C)]   Pulse:  []   Resp:  [11-24]   BP: ()/(46-85)   SpO2:  [95 %-100 %]     Physical Exam  Vitals and nursing note reviewed.   Constitutional:       General: He is awake. He is not in acute distress.     Appearance: He is underweight. He is ill-appearing.   HENT:      Head: Normocephalic and atraumatic.      Nose: Nose normal.      Mouth/Throat:      Mouth: Mucous membranes are moist.      Pharynx: Oropharynx is clear.   Eyes:      General: No scleral icterus.     Extraocular Movements: Extraocular movements intact.      Conjunctiva/sclera: Conjunctivae normal.      Pupils: Pupils are equal, round, and reactive to light.   Cardiovascular:      Rate and Rhythm: Normal rate and regular rhythm.      Pulses: Normal pulses.      Heart sounds: Normal heart sounds. No murmur heard.     No friction rub. No gallop.   Pulmonary:      Effort: Pulmonary effort is normal. No respiratory distress.      Breath sounds: Normal breath sounds. No wheezing, rhonchi or rales.   Abdominal:      General: Abdomen is flat. Bowel sounds are normal. There is no distension.      Palpations: Abdomen is soft.      Tenderness: There is no abdominal tenderness. There is no right CVA tenderness, left CVA tenderness, guarding or rebound.   Genitourinary:     Rectum: Normal. Guaiac result positive.   Musculoskeletal:         General: Normal range of motion.      Cervical back: Normal range of motion and neck supple.      Right lower leg: No edema.      Left lower leg: No edema.      Comments: atrophy   Skin:     General: Skin is  "warm and dry.      Capillary Refill: Capillary refill takes more than 3 seconds.      Coloration: Skin is pale.   Neurological:      General: No focal deficit present.      Mental Status: He is alert and oriented to person, place, and time. Mental status is at baseline.      Comments: forgetful   Psychiatric:         Mood and Affect: Mood normal.         Behavior: Behavior normal. Behavior is cooperative.         Thought Content: Thought content normal.         Judgment: Judgment normal.         Data Review:  CBC:   Lab Results   Component Value Date    WBC 10.31 02/14/2024    RBC 2.70 (L) 02/14/2024    HGB 7.2 (L) 02/14/2024    HCT 24.1 (L) 02/14/2024     02/14/2024     BMP:   Lab Results   Component Value Date     (H) 02/14/2024     02/14/2024    K 5.4 (H) 02/14/2024     02/14/2024    CO2 21 (L) 02/14/2024    BUN 25 (H) 02/14/2024    CREATININE 1.1 02/14/2024    CALCIUM 9.1 02/14/2024     Coagulation: No results found for: "PT", "INR", "APTT"  Cardiac markers: No results found for: "CKMB", "TROPONINT", "MYOGLOBIN"  ABGs:   Lab Results   Component Value Date    PH 7.391 08/29/2023    PO2 12.3 (LL) 08/29/2023    PCO2 48.4 (H) 08/29/2023     Radiology review:   ECG: normal sinus rhythm. No ectopy noted    Assessment:      Active Hospital Problems    Diagnosis  POA    *GI bleed [K92.2]  Yes    Anticoagulant long-term use [Z79.01]  Not Applicable    Anemia of chronic disease [D63.8]  Yes     Chronic    Essential hypertension [I10]  Yes     Chronic    Mild protein malnutrition [E44.1]  Yes     Chronic    Type 2 diabetes mellitus, controlled [E11.9]  Yes     Chronic    Chronic venous insufficiency [I87.2]  Yes      Resolved Hospital Problems   No resolved problems to display.       Plan:      GIB  --OP hgb 6, 7.2 upon arrival  --given one unit PRBC in ED  --continuous IVF  --trend h/h w/ serial CBC  --transfuse for active bleeding or hgb <7  --GI consult  --NPO  --started on IV protonix  --hold " apixaban and ASA    Symptomatic anemia  --pt hypotensive at NH and upon arrival. Responsive to PRBC volume  --pt otherwise asymptomatic  --VS Q4H  --monitor for s/s of active bleeding    Anemia of chronic disease  --pt's baseline hgb is 8.5-9.5. currently 6/7.2  --see plans for GIB and symptomatic anemia    DM2  --A1c on 8/2023 was 5.2  --hold home meds  --POCT glucose Q6H while NPO  --low dose SSI    SHARA  --baseline BUN/Cr ~15/0.7. currently 25/1.1  --likely s/t volume depletion  --trend renal function w/ AM BMP  --avoid nephrotoxic drugs and renally dose medications    Hyperkalemia  --OP labs show K=4.9  --monitor w/ AM BMP  --EKG SB w/o ectopy    HTN  --pt hypotensive on arrival, responsive to PRBC volume  --hold BP meds and restart as clinically appropriate    History of CVA  --pt on apixaban and ASA. Hold for now    VTE prophylaxis: not indicated. Patient is high risk for further bleeding  GI prophylaxis: protonix    Disposition: Admit to observation

## 2024-02-14 NOTE — HPI
78 year old male with history of CVA, Afib?, on Eliquis and asprin. Presented to ED after found to be anemic on routine labs at NH. Does report some constipation. Stool light brown in ED, BP responded to IVF. Denies N/V, melena, hematochezia, and abdominal pain. CT angio A/P with no extravasation, potential stercoral colitis.  Can not recall any endoscopic history.

## 2024-02-14 NOTE — Clinical Note
Diagnosis: GI bleed [533807]   Future Attending Provider: NATASHA GOINS [[id number]]   Special Needs:: Fall Risk [15]

## 2024-02-15 LAB
ACANTHOCYTES BLD QL SMEAR: PRESENT
ANION GAP SERPL CALC-SCNC: 10 MMOL/L (ref 8–16)
ANISOCYTOSIS BLD QL SMEAR: SLIGHT
BASOPHILS # BLD AUTO: 0.05 K/UL (ref 0–0.2)
BASOPHILS NFR BLD: 0.5 % (ref 0–1.9)
BUN SERPL-MCNC: 24 MG/DL (ref 8–23)
CALCIUM SERPL-MCNC: 8.8 MG/DL (ref 8.7–10.5)
CHLORIDE SERPL-SCNC: 106 MMOL/L (ref 95–110)
CO2 SERPL-SCNC: 20 MMOL/L (ref 23–29)
CREAT SERPL-MCNC: 1.1 MG/DL (ref 0.5–1.4)
DIFFERENTIAL METHOD BLD: ABNORMAL
EOSINOPHIL # BLD AUTO: 0 K/UL (ref 0–0.5)
EOSINOPHIL NFR BLD: 0 % (ref 0–8)
ERYTHROCYTE [DISTWIDTH] IN BLOOD BY AUTOMATED COUNT: 18.7 % (ref 11.5–14.5)
EST. GFR  (NO RACE VARIABLE): >60 ML/MIN/1.73 M^2
GLUCOSE SERPL-MCNC: 149 MG/DL (ref 70–110)
HCT VFR BLD AUTO: 27 % (ref 40–54)
HGB BLD-MCNC: 8.2 G/DL (ref 14–18)
HYPOCHROMIA BLD QL SMEAR: ABNORMAL
IMM GRANULOCYTES # BLD AUTO: 0.19 K/UL (ref 0–0.04)
IMM GRANULOCYTES NFR BLD AUTO: 1.8 % (ref 0–0.5)
IRON SERPL-MCNC: 192 UG/DL (ref 45–160)
LYMPHOCYTES # BLD AUTO: 1 K/UL (ref 1–4.8)
LYMPHOCYTES NFR BLD: 9.4 % (ref 18–48)
MCH RBC QN AUTO: 27.8 PG (ref 27–31)
MCHC RBC AUTO-ENTMCNC: 30.4 G/DL (ref 32–36)
MCV RBC AUTO: 92 FL (ref 82–98)
MONOCYTES # BLD AUTO: 0.9 K/UL (ref 0.3–1)
MONOCYTES NFR BLD: 8.2 % (ref 4–15)
NEUTROPHILS # BLD AUTO: 8.5 K/UL (ref 1.8–7.7)
NEUTROPHILS NFR BLD: 80.1 % (ref 38–73)
NRBC BLD-RTO: 0 /100 WBC
OHS QRS DURATION: 126 MS
OHS QTC CALCULATION: 484 MS
PLATELET # BLD AUTO: 307 K/UL (ref 150–450)
PLATELET BLD QL SMEAR: ABNORMAL
PMV BLD AUTO: 10.3 FL (ref 9.2–12.9)
POCT GLUCOSE: 132 MG/DL (ref 70–110)
POCT GLUCOSE: 138 MG/DL (ref 70–110)
POCT GLUCOSE: 143 MG/DL (ref 70–110)
POCT GLUCOSE: 151 MG/DL (ref 70–110)
POIKILOCYTOSIS BLD QL SMEAR: SLIGHT
POLYCHROMASIA BLD QL SMEAR: ABNORMAL
POTASSIUM SERPL-SCNC: 4.7 MMOL/L (ref 3.5–5.1)
RBC # BLD AUTO: 2.95 M/UL (ref 4.6–6.2)
SATURATED IRON: 87 % (ref 20–50)
SCHISTOCYTES BLD QL SMEAR: PRESENT
SODIUM SERPL-SCNC: 136 MMOL/L (ref 136–145)
TARGETS BLD QL SMEAR: ABNORMAL
TOTAL IRON BINDING CAPACITY: 221 UG/DL (ref 250–450)
TRANSFERRIN SERPL-MCNC: 149 MG/DL (ref 200–375)
VIT B12 SERPL-MCNC: 497 PG/ML (ref 210–950)
WBC # BLD AUTO: 10.61 K/UL (ref 3.9–12.7)

## 2024-02-15 PROCEDURE — C9113 INJ PANTOPRAZOLE SODIUM, VIA: HCPCS | Mod: HCNC | Performed by: FAMILY MEDICINE

## 2024-02-15 PROCEDURE — G0378 HOSPITAL OBSERVATION PER HR: HCPCS | Mod: HCNC

## 2024-02-15 PROCEDURE — 63600175 PHARM REV CODE 636 W HCPCS: Mod: HCNC | Performed by: FAMILY MEDICINE

## 2024-02-15 PROCEDURE — 99232 SBSQ HOSP IP/OBS MODERATE 35: CPT | Mod: HCNC,GC,, | Performed by: INTERNAL MEDICINE

## 2024-02-15 PROCEDURE — 97535 SELF CARE MNGMENT TRAINING: CPT | Mod: HCNC

## 2024-02-15 PROCEDURE — 97163 PT EVAL HIGH COMPLEX 45 MIN: CPT | Mod: HCNC

## 2024-02-15 PROCEDURE — 80048 BASIC METABOLIC PNL TOTAL CA: CPT | Mod: HCNC | Performed by: STUDENT IN AN ORGANIZED HEALTH CARE EDUCATION/TRAINING PROGRAM

## 2024-02-15 PROCEDURE — 97530 THERAPEUTIC ACTIVITIES: CPT | Mod: HCNC

## 2024-02-15 PROCEDURE — 85025 COMPLETE CBC W/AUTO DIFF WBC: CPT | Mod: HCNC | Performed by: FAMILY MEDICINE

## 2024-02-15 PROCEDURE — 25000003 PHARM REV CODE 250: Mod: HCNC | Performed by: STUDENT IN AN ORGANIZED HEALTH CARE EDUCATION/TRAINING PROGRAM

## 2024-02-15 PROCEDURE — 97165 OT EVAL LOW COMPLEX 30 MIN: CPT | Mod: HCNC

## 2024-02-15 RX ORDER — POLYETHYLENE GLYCOL 3350 17 G/17G
17 POWDER, FOR SOLUTION ORAL DAILY
Status: DISCONTINUED | OUTPATIENT
Start: 2024-02-15 | End: 2024-02-20 | Stop reason: HOSPADM

## 2024-02-15 RX ADMIN — PANTOPRAZOLE SODIUM 40 MG: 40 INJECTION, POWDER, LYOPHILIZED, FOR SOLUTION INTRAVENOUS at 08:02

## 2024-02-15 RX ADMIN — SODIUM CHLORIDE, POTASSIUM CHLORIDE, SODIUM LACTATE AND CALCIUM CHLORIDE: 600; 310; 30; 20 INJECTION, SOLUTION INTRAVENOUS at 11:02

## 2024-02-15 RX ADMIN — POLYETHYLENE GLYCOL 3350 17 G: 17 POWDER, FOR SOLUTION ORAL at 02:02

## 2024-02-15 NOTE — ASSESSMENT & PLAN NOTE
Chronic, uncontrolled. Latest blood pressure and vitals reviewed-     Temp:  [97.6 °F (36.4 °C)-98.5 °F (36.9 °C)]   Pulse:  []   Resp:  [15-18]   BP: ()/(51-70)   SpO2:  [98 %-100 %] .   Home meds for hypertension were reviewed and noted below.   Hypertension Medications               amLODIPine (NORVASC) 2.5 MG tablet Take 2.5 mg by mouth once daily.    lisinopriL (PRINIVIL,ZESTRIL) 5 MG tablet Take 5 mg by mouth once daily.            While in the hospital, will manage blood pressure as follows; Adjust home antihypertensive regimen as follows- hold due to hypotension on admit

## 2024-02-15 NOTE — PROGRESS NOTES
VIRTUAL NURSE:  Cued into patient's room.  Permission received per patient to turn camera to view patient.  Introduced as VN that will be working with floor nurse and nursing assistant.  Educated patient on VN's role in patient care and  VIP model.  Plan of care reviewed with patient.  Education per flowsheet.   Informed patient that staff will round on them every 2 hours but to use call light for any other needs they may have; informed of fall risk and fall precautions.  Patient verbalized understanding.  Call light within reach; bed siderails up x3.  Opportunity given for questions and questions answered.  Admission assessment questions answered.  Patient denies complaints or any needs at this time. Instructed to call for assistance.  Will cont to monitor and intervene as needed.    Labs, notes, orders, and careplan reviewed.     Permission received per patient to call his son on phone to create a patient password. Notified his son, Axel, via telephone of patient's location. Password created.       02/15/24 1609   Admission   Initial VN Admission Questions Complete   Communication Issues? None   Shift   Virtual Nurse - Rounding Incomplete   Pain Management Interventions quiet environment facilitated;relaxation techniques promoted   Virtual Nurse - Patient Verbalized Approval Of VN Rounding;Camera Use   Safety/Activity   Patient Rounds bed in low position;bed wheels locked;call light in patient/parent reach;ID band on;visualized patient   Safety Promotion/Fall Prevention Fall Risk reviewed with patient/family

## 2024-02-15 NOTE — ASSESSMENT & PLAN NOTE
Nutrition consulted. Most recent weight and BMI monitored-     Measurements:  Wt Readings from Last 1 Encounters:   02/15/24 58.8 kg (129 lb 10.1 oz)   Body mass index is 16.64 kg/m².    Patient has been screened and assessed by RD.    Malnutrition Type:  Context:    Level:      Malnutrition Characteristic Summary:       Interventions/Recommendations (treatment strategy):

## 2024-02-15 NOTE — PROGRESS NOTES
"[facility]           Pharmacy  Admission Medication Reconciliation     Based on information gathered for medication list, you may go to "Admission" then "Reconcile Home Medications" tabs to review and/or act upon those items.     The home medication list has been updated by the Pharmacy department.   Please read ALL comments highlighted in red.   Please address this information as you see fit.    Feel free to contact us if you have any questions or require assistance.    Home medication list has been compared to current inpatient medications. Please review the following discrepancies noted below:      Patient reports STILL TAKING the following medication(s) which was not ordered upon admit  Amlodipine  Apixaban  Ascorbic acid  Aspirin  Atorvastatin  Docusate  Dulaglutide  Ferrous sulfate  Lisinopril    Feel free to contact us if you have any questions or require assistance.    Brenda Sorenson, PharmD  [phone]        "

## 2024-02-15 NOTE — ASSESSMENT & PLAN NOTE
Patient's FSGs are controlled on current medication regimen.  Last A1c reviewed-   Lab Results   Component Value Date    HGBA1C 5.0 02/14/2024     Most recent fingerstick glucose reviewed-   Recent Labs   Lab 02/15/24  0818 02/15/24  1300   POCTGLUCOSE 151* 132*     Current correctional scale  Low  Maintain anti-hyperglycemic dose as follows-   Antihyperglycemics (From admission, onward)    Start     Stop Route Frequency Ordered    02/14/24 1507  insulin aspart U-100 pen 0-5 Units         -- SubQ Before meals & nightly PRN 02/14/24 1407        Hold Oral hypoglycemics while patient is in the hospital- not on home oral antiglycemia

## 2024-02-15 NOTE — ASSESSMENT & PLAN NOTE
Anemia of chronic disease  Symptomatic anemia  Hypotensive on admit    Patient's anemia is currently uncontrolled. Has received 1 units of PRBCs on 2/4 in the ED on arrival . Etiology likely d/t acute blood loss which was from GI bleed  Current CBC reviewed-   Lab Results   Component Value Date    HGB 8.2 (L) 02/15/2024    HCT 27.0 (L) 02/15/2024     Monitor serial CBC and transfuse if patient becomes hemodynamically unstable, symptomatic or H/H drops below 7/21.

## 2024-02-15 NOTE — PT/OT/SLP EVAL
Physical Therapy Evaluation    Patient Name:  Axel Burgos   MRN:  [id number]    Recommendations:     Discharge Recommendations: Return to [facility] as patient is at his PLOF.    Discharge Equipment Recommendations: to be determined by next level of care   Barriers to discharge: None    Assessment:     Axel Burgos is a 78 y.o. male admitted with a medical diagnosis of GI bleed.  He presents with the following impairments/functional limitations: weakness, impaired endurance, gait instability, impaired functional mobility, impaired balance, decreased lower extremity function, decreased safety awareness, impaired cardiopulmonary response to activity . PT evaluation completed this date. He was admitted from the [facility] and is confused, will need clarification regarding PLOF.  Update from 2/15 PM. OT called [facility] and spoke with RN staff, pt primarily bed bound and uses jorge luis lift to transfer bed to chair. This date he requires x 2 assist for bed mobility and partial transfers/lateral scooting. Will continue to follow and progress as able. Recommend return to [facility] as patient is at his PLOF. DC PT.     Rehab Prognosis: Poor; Pt is at his functional baseline and does not qualify for continued skilled PT services.   Recent Surgery: * No surgery found *      Plan:     During this hospitalization, patient to be discharged from PT services as he is at his functional baseline and is primarily bed bound and uses Jorge Luis lift to get out of bed at nursing home.   Plan of Care Expires:  DC PT     Subjective     Chief Complaint: fatigue  Patient/Family Comments/goals: to go home  Pain/Comfort:  Pain Rating 1: 0/10  Pain Rating Post-Intervention 1: 0/10    Patients cultural, spiritual, Church conflicts given the current situation:      Living Environment:  Pt admitted from [facility] and unknown PLOF d/t confusion. OT called and spoke with RN staff at facility and they reported pt is primarily bed bound  and uses jorge luis lift to get from bed to chair at baseline.     Prior to admission, level of function was independent.  Equipment used at home:hospital bed, jorge luis lift, wheelchair.  DME owned (not currently used): none.  Upon discharge, patient will have assistance from [facility] home staff.    Objective:     Communicated with RN prior to session.  Patient found supine with vitals machine  upon PT entry to room.    General Precautions: Standard, fall  Orthopedic Precautions:N/A   Braces: N/A  Respiratory Status: Room air    Exams:  Cognitive Exam:  Patient is oriented to Person and Place  Postural Exam:  Patient presented with the following abnormalities:    -       Rounded shoulders  -       Forward head  -       Kyphosis  Sensation:    -       Intact  RLE ROM: WFL  RLE Strength: Grossly 4-/5 except hip flexor 3+/5   LLE ROM: WFL  LLE Strength: Grossly 4-/5 except hip flexor 3+/5     Functional Mobility:  Bed Mobility:     Supine to Sit: moderate assistance and of 2 persons  Sit to Supine: moderate assistance and of 2 persons  Transfers:     Sit to Stand:  maximal assistance and of 2 persons with hand-held assist. Patient only able to achieve < 25% partial stand and requires max a x 2 to laterally scoot and put weight through BLEs.   Gait: N/A  Balance: Patient tolerates static sitting well with sba-min a. Posterior lean noted occasionally especially with progression to dynamic activities.Poor static standing balance noted with attempted STS transfer.       AM-PAC 6 CLICK MOBILITY  Total Score:11       Treatment & Education:  Assisted with rolling L/R with min a for perineal hygiene and linen change as patient had large bowel movement. Pt sat eob x approximately 10 minutes with sba-min a and educated on sequencing of attempted STS and lateral scooting which pt required max a x 2 to perform. Cued patient for cervical extension and scapular retraction as patient has poor posture. Pt able to respond to cues for cervical  extension intermittently.     Patient left right sidelying with all lines intact, call button in reach, and RN notified.    GOALS:   Multidisciplinary Problems       Physical Therapy Goals       Not on file              Multidisciplinary Problems (Resolved)          Problem: Physical Therapy    Goal Priority Disciplines Outcome Goal Variances Interventions   Physical Therapy Goal   (Resolved)     PT, PT/OT Met                         History:     Past Medical History:   Diagnosis Date    Anticoagulant long-term use     Diabetes mellitus     Hypertension     Stroke     Varicose veins        Past Surgical History:   Procedure Laterality Date    VARICOSE VEIN SURGERY         Time Tracking:     PT Received On: 02/15/24  PT Start Time: 0908     PT Stop Time: 0936  PT Total Time (min): 28 min     Billable Minutes: Evaluation 10 and Therapeutic Activity 8      02/15/2024

## 2024-02-15 NOTE — ED NOTES
Pt incontinent of stool. Pt cleaned. Clean sheets and diaper applied. Provided pt w/ clean blankets. Denies other needs at this time. CB within reach.

## 2024-02-15 NOTE — ED NOTES
Pt had BM. Pt cleaned. Fresh diaper, gown, and socks applied. Pt denies other needs at this time. CB within reach.

## 2024-02-15 NOTE — ED NOTES
Nurse attempted x2 to start 2nd IV. Both IV's infiltrated. Transport to room to transport pt to room 524. No belongings noted to be at BS at this time. Telemetry monitor place on pt. LR infusing @ 100 ml/hr R AC IV. No acute distress noted.

## 2024-02-15 NOTE — PLAN OF CARE
[facility] - Emergency Dept  Initial Discharge Assessment       Primary Care Provider: Home, [facility]    Admission Diagnosis: GI bleed [K92.2]    Admission Date: 2/14/2024  Expected Discharge Date:     Transition of Care Barriers: (P) None    Payor: [employer] MANAGED MEDICARE / Plan: [employer] MEDICARE HMO / Product Type: Capitation /     Extended Emergency Contact Information  Primary Emergency Contact: Axel Wong   [facility]  Home Phone: [phone]  Mobile Phone: [phone]  Relation: Son  Secondary Emergency Contact: Rod,Jannet   [facility]  Home Phone: [phone]  Relation: Friend    Discharge Plan A: (P) Return to nursing home         [facility] [address]  [address] [facility] [address]  Phone: [phone] Fax: [phone]      Initial Assessment (most recent)       Adult Discharge Assessment - 02/15/24 1359          Discharge Assessment    Assessment Type Discharge Planning Brief Assessment (P)      Confirmed/corrected address, phone number and insurance Yes (P)      Source of Information health record;facility verbal report (P)      People in Home facility resident (P)      Equipment Currently Used at Home lift device;wheelchair;hospital bed (P)      Do you take prescription medications? Yes (P)      Do you have prescription coverage? Yes (P)      Coverage [employer] (P)      Do you have any problems affording any of your prescribed medications? No (P)      Is the patient taking medications as prescribed? yes (P)      Who is going to help you get home at discharge? [facility] will  pt (P)      How do you get to doctors appointments? agency (P)      Discharge Plan A Return to nursing home (P)      DME Needed Upon Discharge  none (P)      Transition of Care Barriers None (P)         Physical Activity    On average, how many days per week do you engage in moderate to strenuous  exercise (like a brisk walk)? Patient declined (P)         OTHER    Name(s) of People in Home Pt resides at [facility] (P)                       SW reviewed pt's visit to the ED. Pt is a resident at [facility], where he is assisted with all ADLs and utilizes a jorge luis lift device as needed for toileting and bathing. Discharge plan to return there. No CM needs noted at this time.     Marley Pathak, [facility]  ED Social Work  [phone]

## 2024-02-15 NOTE — ASSESSMENT & PLAN NOTE
Positive stool occult blood  Hold long term oral anticoagulant  Trend H/H   NPO  Trend to keep Hgb> 7  Protonix IV  Consult GI    2/15  -evaluated by GI we will plan for endoscopy however patient has declined and son is aware  -we will reach out to GI tomorrow to see if it is safe to resume Eliquis

## 2024-02-15 NOTE — PROGRESS NOTES
[facility]  Progress Note    Patient Name: Axel Burgos  MRN: [id number]  Patient Class: OP- Observation   Admission Date: 2/14/2024  Length of Stay: 0 days  Attending Physician: Renee Montanez MD  Primary Care Provider: Home, [facility]        Subjective:     Principal Problem:GI bleed        HPI:  Axel Burgos is a 78 y.o. male with  PMHx of DM2, CVA x2 on apixaban and ASA, HTN, and varicose veins presents to [facility] ED via EMS from [facility] for symptomatic anemia (hgb 6.0) and positive occult blood during routine testing. Pt denies ABD pain, N/V/D/C, CP, SOB, dizziness. States he has been eating, drinking, and taking PO meds w/o issue. ED workup significant for Hgb 7.2, K 5.4, BUN/Cr 25/1.1, CTA ABD shows fecal impaction and non-obstructing nephrolithiasis. Given one unit PRBCs, and IV protonix in ED. BP stable in low 100s. No active bleeding from rectum. Admit hospital medicine and consult GI for evaluation    Overview/Hospital Course:  No notes on file    Interval History:  Patient is seen and evaluated at bedside this morning.  No acute events overnight.  Denies any acute complaint.  GI consulted for GI bleed however patient declined endoscopy, patient's son aware.  Constipated, started on MiraLax.    Review of Systems   Constitutional:  Positive for fatigue. Negative for chills and fever.   HENT:  Negative for congestion and nosebleeds.    Eyes:  Negative for pain and discharge.   Respiratory:  Negative for chest tightness, shortness of breath and wheezing.    Cardiovascular:  Negative for chest pain, palpitations and leg swelling.   Gastrointestinal:  Positive for abdominal distention and constipation. Negative for abdominal pain, diarrhea, nausea and vomiting.   Genitourinary:  Negative for dysuria, flank pain and hematuria.   Musculoskeletal:  Negative for joint swelling.   Neurological:  Negative for seizures and speech difficulty.    Psychiatric/Behavioral:  Negative for agitation and confusion.      Objective:     Vital Signs (Most Recent):  Temp: 98 °F (36.7 °C) (02/15/24 1437)  Pulse: 90 (02/15/24 1612)  Resp: 16 (02/15/24 1437)  BP: 132/70 (02/15/24 1437)  SpO2: 98 % (02/15/24 1437) Vital Signs (24h Range):  Temp:  [97.6 °F (36.4 °C)-98.5 °F (36.9 °C)] 98 °F (36.7 °C)  Pulse:  [] 90  Resp:  [15-18] 16  SpO2:  [98 %-100 %] 98 %  BP: ()/(51-70) 132/70     Weight: 58.8 kg (129 lb 10.1 oz)  Body mass index is 16.64 kg/m².  No intake or output data in the 24 hours ending 02/15/24 1722      Physical Exam  Constitutional:       General: He is not in acute distress.     Appearance: Normal appearance. He is obese. He is not ill-appearing.   HENT:      Head: Normocephalic.      Right Ear: External ear normal.      Left Ear: External ear normal.      Nose: Nose normal.      Mouth/Throat:      Mouth: Mucous membranes are moist.   Eyes:      Conjunctiva/sclera: Conjunctivae normal.      Pupils: Pupils are equal, round, and reactive to light.   Cardiovascular:      Rate and Rhythm: Normal rate and regular rhythm.   Pulmonary:      Effort: Pulmonary effort is normal. No respiratory distress.      Breath sounds: Normal breath sounds. No wheezing.   Abdominal:      General: Bowel sounds are normal. There is distension.      Palpations: Abdomen is soft.      Tenderness: There is no abdominal tenderness.   Musculoskeletal:         General: No swelling. Normal range of motion.      Cervical back: Neck supple.   Skin:     General: Skin is warm.      Capillary Refill: Capillary refill takes less than 2 seconds.      Findings: No erythema.   Neurological:      General: No focal deficit present.      Mental Status: He is oriented to person, place, and time. Mental status is at baseline.   Psychiatric:         Mood and Affect: Mood normal.         Behavior: Behavior normal.             Significant Labs: All pertinent labs within the past 24 hours have  been reviewed.    Significant Imaging: I have reviewed all pertinent imaging results/findings within the past 24 hours.    Assessment/Plan:      * GI bleed  Positive stool occult blood  Hold long term oral anticoagulant  Trend H/H   NPO  Trend to keep Hgb> 7  Protonix IV  Consult GI    2/15  -evaluated by GI we will plan for endoscopy however patient has declined and son is aware  -we will reach out to GI tomorrow to see if it is safe to resume Eliquis        Chronic idiopathic constipation  Started on MiraLax daily  If no bowel movement tomorrow we will increase to b.i.d.    Normocytic anemia  Patient's anemia is currently controlled. Has not received any PRBCs to date. Etiology likely d/t chronic blood loss  Current CBC reviewed-   Lab Results   Component Value Date    HGB 8.2 (L) 02/15/2024    HCT 27.0 (L) 02/15/2024     Monitor serial CBC and transfuse if patient becomes hemodynamically unstable, symptomatic or H/H drops below 7/21.    Symptomatic anemia  Patient's anemia is currently controlled. Has not received any PRBCs to date. Etiology likely d/t chronic blood loss  Current CBC reviewed-   Lab Results   Component Value Date    HGB 8.2 (L) 02/15/2024    HCT 27.0 (L) 02/15/2024     Monitor serial CBC and transfuse if patient becomes hemodynamically unstable, symptomatic or H/H drops below 7/21.    Acute blood loss anemia  Anemia of chronic disease  Symptomatic anemia  Hypotensive on admit    Patient's anemia is currently uncontrolled. Has received 1 units of PRBCs on 2/4 in the ED on arrival . Etiology likely d/t acute blood loss which was from GI bleed  Current CBC reviewed-   Lab Results   Component Value Date    HGB 8.2 (L) 02/15/2024    HCT 27.0 (L) 02/15/2024     Monitor serial CBC and transfuse if patient becomes hemodynamically unstable, symptomatic or H/H drops below 7/21.    Anticoagulant long-term use  This patient has long term use on an anticoagulant with Select Anticoagulant(s): Direct oral  anticoagulant: Apixaban (Eliquis). Their long term anticoagulation will be Held or Continued: held. They are on long term anticoagulation due to Reason for Anticoagulation: Stroke prevention.   Hold due to GI bleed    Mild protein malnutrition  Nutrition consulted. Most recent weight and BMI monitored-     Measurements:  Wt Readings from Last 1 Encounters:   02/15/24 58.8 kg (129 lb 10.1 oz)   Body mass index is 16.64 kg/m².    Patient has been screened and assessed by RD.    Malnutrition Type:  Context:    Level:      Malnutrition Characteristic Summary:       Interventions/Recommendations (treatment strategy):         Anemia of chronic disease  Patient's anemia is currently controlled. Has not received any PRBCs to date. Etiology likely d/t chronic blood loss  Current CBC reviewed-   Lab Results   Component Value Date    HGB 8.2 (L) 02/15/2024    HCT 27.0 (L) 02/15/2024     Monitor serial CBC and transfuse if patient becomes hemodynamically unstable, symptomatic or H/H drops below 7/21.    Type 2 diabetes mellitus, controlled  Patient's FSGs are controlled on current medication regimen.  Last A1c reviewed-   Lab Results   Component Value Date    HGBA1C 5.0 02/14/2024     Most recent fingerstick glucose reviewed-   Recent Labs   Lab 02/15/24  0818 02/15/24  1300   POCTGLUCOSE 151* 132*     Current correctional scale  Low  Maintain anti-hyperglycemic dose as follows-   Antihyperglycemics (From admission, onward)      Start     Stop Route Frequency Ordered    02/14/24 1507  insulin aspart U-100 pen 0-5 Units         -- SubQ Before meals & nightly PRN 02/14/24 1407          Hold Oral hypoglycemics while patient is in the hospital- not on home oral antiglycemia    Essential hypertension  Chronic, uncontrolled. Latest blood pressure and vitals reviewed-     Temp:  [97.6 °F (36.4 °C)-98.5 °F (36.9 °C)]   Pulse:  []   Resp:  [15-18]   BP: ()/(51-70)   SpO2:  [98 %-100 %] .   Home meds for hypertension were  reviewed and noted below.   Hypertension Medications               amLODIPine (NORVASC) 2.5 MG tablet Take 2.5 mg by mouth once daily.    lisinopriL (PRINIVIL,ZESTRIL) 5 MG tablet Take 5 mg by mouth once daily.            While in the hospital, will manage blood pressure as follows; Adjust home antihypertensive regimen as follows- hold due to hypotension on admit      Chronic venous insufficiency  chronic        VTE Risk Mitigation (From admission, onward)           Ordered     IP VTE HIGH RISK PATIENT  Once         02/14/24 1407     Place sequential compression device  Until discontinued         02/14/24 1407                    Discharge Planning   JESSI:      Code Status: Full Code   Is the patient medically ready for discharge?:     Reason for patient still in hospital (select all that apply): Treatment and Consult recommendations  Discharge Plan A: Return to nursing home                  Renee Montanez MD  Department of Hospital Medicine   [facility]

## 2024-02-15 NOTE — ED NOTES
Report called to receiving nurse bed 524. Allowed for questions to be answered. Pt will be transported with belongings to room.

## 2024-02-15 NOTE — SUBJECTIVE & OBJECTIVE
Interval History:  Patient is seen and evaluated at bedside this morning.  No acute events overnight.  Denies any acute complaint.  GI consulted for GI bleed however patient declined endoscopy, patient's son aware.  Constipated, started on MiraLax.    Review of Systems   Constitutional:  Positive for fatigue. Negative for chills and fever.   HENT:  Negative for congestion and nosebleeds.    Eyes:  Negative for pain and discharge.   Respiratory:  Negative for chest tightness, shortness of breath and wheezing.    Cardiovascular:  Negative for chest pain, palpitations and leg swelling.   Gastrointestinal:  Positive for abdominal distention and constipation. Negative for abdominal pain, diarrhea, nausea and vomiting.   Genitourinary:  Negative for dysuria, flank pain and hematuria.   Musculoskeletal:  Negative for joint swelling.   Neurological:  Negative for seizures and speech difficulty.   Psychiatric/Behavioral:  Negative for agitation and confusion.      Objective:     Vital Signs (Most Recent):  Temp: 98 °F (36.7 °C) (02/15/24 1437)  Pulse: 90 (02/15/24 1612)  Resp: 16 (02/15/24 1437)  BP: 132/70 (02/15/24 1437)  SpO2: 98 % (02/15/24 1437) Vital Signs (24h Range):  Temp:  [97.6 °F (36.4 °C)-98.5 °F (36.9 °C)] 98 °F (36.7 °C)  Pulse:  [] 90  Resp:  [15-18] 16  SpO2:  [98 %-100 %] 98 %  BP: ()/(51-70) 132/70     Weight: 58.8 kg (129 lb 10.1 oz)  Body mass index is 16.64 kg/m².  No intake or output data in the 24 hours ending 02/15/24 1722      Physical Exam  Constitutional:       General: He is not in acute distress.     Appearance: Normal appearance. He is obese. He is not ill-appearing.   HENT:      Head: Normocephalic.      Right Ear: External ear normal.      Left Ear: External ear normal.      Nose: Nose normal.      Mouth/Throat:      Mouth: Mucous membranes are moist.   Eyes:      Conjunctiva/sclera: Conjunctivae normal.      Pupils: Pupils are equal, round, and reactive to light.    Cardiovascular:      Rate and Rhythm: Normal rate and regular rhythm.   Pulmonary:      Effort: Pulmonary effort is normal. No respiratory distress.      Breath sounds: Normal breath sounds. No wheezing.   Abdominal:      General: Bowel sounds are normal. There is distension.      Palpations: Abdomen is soft.      Tenderness: There is no abdominal tenderness.   Musculoskeletal:         General: No swelling. Normal range of motion.      Cervical back: Neck supple.   Skin:     General: Skin is warm.      Capillary Refill: Capillary refill takes less than 2 seconds.      Findings: No erythema.   Neurological:      General: No focal deficit present.      Mental Status: He is oriented to person, place, and time. Mental status is at baseline.   Psychiatric:         Mood and Affect: Mood normal.         Behavior: Behavior normal.             Significant Labs: All pertinent labs within the past 24 hours have been reviewed.    Significant Imaging: I have reviewed all pertinent imaging results/findings within the past 24 hours.

## 2024-02-15 NOTE — ASSESSMENT & PLAN NOTE
Patient's anemia is currently controlled. Has not received any PRBCs to date. Etiology likely d/t chronic blood loss  Current CBC reviewed-   Lab Results   Component Value Date    HGB 8.2 (L) 02/15/2024    HCT 27.0 (L) 02/15/2024     Monitor serial CBC and transfuse if patient becomes hemodynamically unstable, symptomatic or H/H drops below 7/21.

## 2024-02-15 NOTE — PLAN OF CARE
Problem: Occupational Therapy  Goal: Occupational Therapy Goal  Outcome: Adequate for Care Transition       Pt seen for initial evaluation this AM. Pt requiring increased assist for all axs; unable to stand and/or perform functional mobility. Called and spoke with nsg at [facility]'s home- pt requires total A for all ADLs excluding feeding, does not ambulate, mostly bed bound, but t/fs to w/c with jorge luis lift. Pt with no further OT needs at this time. D/c OT    Patient : Jameson Cheung Age: 32year old Sex: female   MRN: [id number] Encounter Date: 11/30/2018    Y03/Y03    History     Chief Complaint   Patient presents with   â¢ Sore Throat     HPI   11/30/2018  7:55 PM Jameson Cheung is a 32year old female w/h/o IBS who presents to the ED for evaluation of sore throat that began yesterday. She also c/o generalized myalgias, SOB, nausea, chills, and subjective fever. She denies vomiting, diarrhea, or any other pertinent sx. Merlene Francisco states she has never had an illness greater than a cold. Chance of pregnancy is denied as pt has Nuvaring. There are no further complaints or modifying factors at this time. PCP: Maria C Niño MD      Allergies   Allergen Reactions   â¢ Latex   (Environmental) PRURITUS   â¢ Seafood   (Food)           Medication List      Prior to Admission Medications      Sig   acetaminophen 500 MG tablet  Commonly known as:  TYLENOL   1,000 mg, Oral, EVERY 6 HOURS PRN     amoxicillin-clavulanate 875-125 MG per tablet  Commonly known as:  AUGMENTIN   One tablet P.O. BID for 10 days. Take with food.      benzonatate 200 MG capsule  Commonly known as:  TESSALON   200 mg, Oral, 3 TIMES DAILY PRN     DEXILANT 60 MG capsule  Generic drug:  Dexlansoprazole   60 mg, Oral, DAILY     dicyclomine 20 MG tablet  Commonly known as:  BENTYL   20 mg, Oral, 4 TIMES DAILY BEFORE MEALS & NIGHTLY     Hydrocod Polst-Chlorphen Polst 10-8 MG/5ML Suspension Extended Release   5 mLs, Oral, NIGHTLY PRN, 5 ML PO HS prn cough     HYDROcodone-acetaminophen 5-325 MG per tablet  Commonly known as:  NORCO   1-2 tablets, Oral, EVERY 6 HOURS PRN     * ibuprofen 800 MG tablet  Commonly known as:  MOTRIN   800 mg, Oral, EVERY 8 HOURS PRN     * ibuprofen 600 MG tablet  Commonly known as:  MOTRIN   600 mg, Oral, EVERY 6 HOURS PRN     * ibuprofen 800 MG tablet  Commonly known as:  MOTRIN   800 mg, Oral, 3 TIMES DAILY PRN     meclizine HCl 25 MG tablet  Commonly known as:  ANTIVERT   25 mg, Oral, 3 TIMES DAILY PRN     MIRENA IU   Intrauterine     NUVARING 0.12-0.015 MG/24HR vaginal ring  Generic drug:  etonogestrel-ethinyl estradiol   No dose, route, or frequency recorded. omeprazole 20 MG capsule  Commonly known as:  PRILOSEC   20 mg, Oral, DAILY     * ondansetron 4 MG disintegrating tablet  Commonly known as:  ZOFRAN ODT   4 mg, Oral, EVERY 8 HOURS PRN     * ondansetron 4 MG disintegrating tablet  Commonly known as:  ZOFRAN ODT   4 mg, Oral, EVERY 6 HOURS         * There are duplicate medications prescribed to the patient                Past Medical History:   Diagnosis Date   â¢ Gastroesophageal reflux disease    â¢ IBS (irritable bowel syndrome)        Past surgical history reviewed. No pertinent past surgical history. Family history reviewed. No pertinent family history. Social History     Tobacco Use   â¢ Smoking status: Current Every Day Smoker     Packs/day: 1.00     Types: Cigarettes   â¢ Smokeless tobacco: Never Used   Substance Use Topics   â¢ Alcohol use: Yes     Comment: occasional   â¢ Drug use: Yes     Types: Marijuana       Review of Systems   Constitutional: Positive for chills and fever (subjective). Negative for appetite change and fatigue. HENT: Positive for sore throat. Respiratory: Positive for shortness of breath. Negative for cough and chest tightness. Cardiovascular: Negative for chest pain, palpitations and leg swelling. Gastrointestinal: Positive for nausea. Negative for abdominal pain, diarrhea and vomiting. Genitourinary: Negative for dysuria. Musculoskeletal: Positive for myalgias (generalized). Skin: Negative for rash. Allergic/Immunologic: Negative for immunocompromised state. Neurological: Negative for weakness and headaches. Psychiatric/Behavioral: Negative for confusion. The patient is not nervous/anxious.         Physical Exam     ED Triage Vitals [11/30/18 7690]   ED Triage Vitals Group      Temp 99.5 Â°F (37.5 Â°C)      Pulse 97      Resp 16 /60      SpO2 99 %      EtCO2 mmHg       Height       Weight       Weight Scale Used        Physical Exam   Constitutional: She is oriented to person, place, and time. Vital signs are normal. She appears well-developed and well-nourished. She does not have a sickly appearance. No distress. HENT:   Head: Normocephalic and atraumatic. Nose: Nose normal.   Mouth/Throat: Uvula is midline and mucous membranes are normal. Oropharyngeal exudate and posterior oropharyngeal edema (3+) present. No posterior oropharyngeal erythema or tonsillar abscesses. Large, white patchy exudates bilaterally. Soft palate is symmetric. Uvula midline. No trismus, drooling or floor of mouth pain. Eyes: Conjunctivae and EOM are normal. Pupils are equal, round, and reactive to light. Neck: Trachea normal, normal range of motion and full passive range of motion without pain. Neck supple. Cardiovascular: Regular rhythm, S1 normal, S2 normal, normal heart sounds and normal pulses. Tachycardia present. No murmur heard. Pulses:       Radial pulses are 2+ on the right side, and 2+ on the left side. Dorsalis pedis pulses are 2+ on the right side, and 2+ on the left side. Pulmonary/Chest: Effort normal and breath sounds normal. No accessory muscle usage. No respiratory distress. Abdominal: Soft. Bowel sounds are normal. She exhibits no distension. There is no hepatosplenomegaly or splenomegaly. There is no tenderness. There is no rigidity and no guarding. Musculoskeletal: Normal range of motion. Lymphadenopathy:     She has cervical adenopathy. Right cervical: Superficial cervical and posterior cervical adenopathy present. Left cervical: Superficial cervical and posterior cervical adenopathy present. Neurological: She is alert and oriented to person, place, and time. She has normal strength. No cranial nerve deficit or sensory deficit. GCS eye subscore is 4. GCS verbal subscore is 5.  GCS motor subscore is 6.   Skin: Skin is warm, dry and intact. No rash noted. She is not diaphoretic. Pt is warm to-the-touch. Psychiatric: She has a normal mood and affect. Judgment normal.   Nursing note and vitals reviewed. ED Course     Procedures    Lab Results     Results for orders placed or performed during the hospital encounter of 11/30/18   Rapid strep A POC   Result Value Ref Range    GRP A STREP Negative     Internal Procedural Controls Acceptable Yes          ED Medication Orders (From admission, onward)    Start Ordered     Status Ordering Provider    11/30/18 2030 11/30/18 1959  dexamethasone (DECADRON) tablet 10 mg  ONCE      Last MAR action:  Given Karleece Mary Grace    11/30/18 2000 11/30/18 1959  ondansetron (ZOFRAN ODT) disintegrating tablet 4 mg  ONCE      Last MAR action:  Given Karleece Mary Grace    11/30/18 2000 11/30/18 1959  ibuprofen (MOTRIN) tablet 800 mg  ONCE      Last MAR action:  Given Isadora Brody               MDM   Vitals  Vitals:    11/30/18 1917   BP: 137/60   Pulse: 97   Resp: 16   Temp: 99.5 Â°F (37.5 Â°C)   TempSrc: Oral   SpO2: 99%       ED Course  7:57 PM Initial Impression: The pt is a 32year old female who presents to the ED today with chills, sore throat, and nausea that began yesterday. Strep throat swab will be ordered for further assessment. Plan to administer decadron, ibuprofen, and Zofran for sx relief. The patient indicates understanding of these issues and agrees with the plan. 8:14 PM Patient Recheck: I informed the pt that her strep swab was negative. I would like to retest to be sure that her sx are due to strep rather than mono. The pt understands and agrees to the plan. All questions have been addressed. 8:15 PM I personally performed the throat swab.     8:22 PM Nurse Update: Pt's repeat strep swab was negative. 8:47 PM Nurse Update: Decadron has been given. 8:54 PM Patient Recheck: Pt is seated comfortably in the room.  I informed the pt that her repeat strep swab was negative. I suspect the pt has mono. We discussed the plan for f/u with PCP for mono testing in two weeks. Ibuprofen or Tylenol can be taken for pain. I will write a prescription for lidocaine mouthwash. I suggest increasing fluid intake. The pt understands and agrees to the plan. All questions have been addressed. MDM  31 y/o female presents with 2 day h/o of ST, myalgias, and cough. Low grade T and mild tachycardia, stable. Tonsils 3+ with white, patchy exudates. No evidence of PTA. Rapid strep negative. I repeated the test to confirm. H&P c/w Mononucleosis. Pt was provided with information regarding mononucleosis, including importance of hydration and avoiding contact sports d/t risk of splenic rupture. Plan for supportive care. Critical Care time spent on this patient outside of billable procedures:  None    Clinical Impression  ED Diagnosis        Final diagnosis    Infectious mononucleosis without complication, infectious mononucleosis due to unspecified organism                The patient was provided with a recommendation to follow up with a primary care provider and obtain reassessment of his/her blood pressure within three months. Follow Up:  Alexander Graf MD  [facility] [address]  [phone]    Call in 1 week  Call to set up an appointment in 2 weeks for further evaluation of suspected Mononucleosis          Summary of your Discharge Medications      Take these Medications      Details   lidocaine viscous 2 % solution  Commonly known as:  XYLOCAINE   Take 15 mLs by mouth as needed for Pain. ASK your doctor about these medications      Details   * ibuprofen 800 MG tablet  Commonly known as:  MOTRIN  Ask about: Which instructions should I use? Take 1 tablet by mouth every 8 hours as needed for Pain. * ibuprofen 600 MG tablet  Commonly known as:  MOTRIN  Ask about: Which instructions should I use?    Take 1 tablet by mouth every 6 hours as needed for Pain. * ibuprofen 800 MG tablet  Commonly known as:  MOTRIN  Ask about: Which instructions should I use? Take 1 tablet by mouth 3 times daily as needed for Pain. * ibuprofen 800 MG tablet  Commonly known as:  MOTRIN  Ask about: Which instructions should I use? Take 1 tablet by mouth 3 times daily as needed for Pain. * This list has 4 medication(s) that are the same as other medications prescribed for you. Read the directions carefully, and ask your doctor or other care provider to review them with you. Pt is discharged to home/self care in stable condition. I have reviewed the information recorded by the scribe for accuracy and agree with its contents.    ____________________________________________________________________    Little Brand acting as a scribe for Marissa Herman PA-C.     Marissa Herman PA-C  Dictation # [id number]  Scribe: Little Brand    Attending Physician: Dr. Duff Devoid # [id number]       Qi Booth PA-C  12/01/18 5903

## 2024-02-15 NOTE — PROGRESS NOTES
[facility] - Emergency Dept  Gastroenterology  Progress Note    Patient Name: Axel Burgos  MRN: [id number]  Admission Date: 2/14/2024  Hospital Length of Stay: 0 days  Code Status: Full Code   Attending Provider: Renee Montanez MD  Consulting Provider: Cali Garnica MD  Primary Care Physician: Matty Patel MD  Principal Problem: GI bleed        Subjective:     Interval History: Patient seen and examined this morning. Offered endoscopy yesterday, patient does not wish to move forward with any invasive procedures. Discussed this with this son this morning as well. No N/V or abdominal pain. Loose brown stool this AM.     Review of Systems   Constitutional:  Negative for chills, fatigue and fever.   HENT:  Negative for mouth sores, sore throat and trouble swallowing.    Gastrointestinal:  Negative for abdominal pain, blood in stool, nausea and vomiting.   All other systems reviewed and are negative.    Objective:     Vital Signs (Most Recent):  Temp: 98.5 °F (36.9 °C) (02/15/24 0803)  Pulse: 96 (02/15/24 0803)  Resp: 18 (02/15/24 0243)  BP: (!) 105/55 (02/15/24 0803)  SpO2: 99 % (02/15/24 0803) Vital Signs (24h Range):  Temp:  [97.6 °F (36.4 °C)-98.5 °F (36.9 °C)] 98.5 °F (36.9 °C)  Pulse:  [] 96  Resp:  [11-20] 18  SpO2:  [96 %-100 %] 99 %  BP: ()/(51-85) 105/55        There is no height or weight on file to calculate BMI.    No intake or output data in the 24 hours ending 02/15/24 1031    Lines/Drains/Airways       Peripheral Intravenous Line  Duration                  Peripheral IV - Single Lumen 02/14/24 1305 20 G Right;Lateral Antecubital <1 day                     Physical Exam  Vitals and nursing note reviewed.   Constitutional:       General: He is not in acute distress.     Appearance: Normal appearance. He is not toxic-appearing.   HENT:      Head: Normocephalic and atraumatic.   Eyes:      General: No scleral icterus.     Extraocular Movements: Extraocular movements intact.    Cardiovascular:      Rate and Rhythm: Normal rate and regular rhythm.   Pulmonary:      Effort: Pulmonary effort is normal.      Breath sounds: Normal breath sounds. No wheezing.   Abdominal:      General: There is no distension.      Palpations: Abdomen is soft.      Tenderness: There is no abdominal tenderness. There is no guarding.   Skin:     General: Skin is warm and dry.      Findings: Bruising present.   Neurological:      Mental Status: He is alert.          Significant Labs:  CBC:   Recent Labs   Lab 02/14/24  0751 02/14/24  1822 02/15/24  0606   WBC 10.31 16.28* 10.61   HGB 7.2* 8.0* 8.2*   HCT 24.1* 25.6* 27.0*    272 307     CMP:   Recent Labs   Lab 02/14/24  0751 02/15/24  0733   * 149*   CALCIUM 9.1 8.8   ALBUMIN 2.5*  --    PROT 6.5  --     136   K 5.4* 4.7   CO2 21* 20*    106   BUN 25* 24*   CREATININE 1.1 1.1   ALKPHOS 85  --    ALT 10  --    AST 23  --    BILITOT 0.7  --          Significant Imaging:  Imaging results within the past 24 hours have been reviewed. CT angio A/P with no extravasation. Stool in rectum, potential stercoral colitis   Assessment/Plan:     Oncology  Normocytic anemia  Chronic anemia that has slowly progressed since 2015  Hgb stable, no signs of acute blood loss, brown stool, CTA negative   Advance diet as tolerated   Discussed EGD/Colonoscopy again today, patient declined and would like to avoid invasive procedures. I discussed this with his son as well.      GI  Chronic idiopathic constipation  Start miralax daily, uptitrate to BID if needed       Thank you for your consult.     Cali Garnica MD  Gastroenterology  [facility] - Emergency Dept

## 2024-02-15 NOTE — ED NOTES
Pt remains awake, taking pulse oximeter off finger and putting it back on. Pt denies taking medications for sleep. Declined offer for melatonin. Denies needs at this time. CB within reach.

## 2024-02-15 NOTE — ED NOTES
Upon entering room, pt removing cardiac leads. New stickers placed and leads reconnected. Pt asked to leave leads in place r/t need to monitor heart rhythm. Pt verbalized understanding.

## 2024-02-15 NOTE — PLAN OF CARE
PT evaluation completed this date. He was admitted from the [facility] and is confused, will need clarification regarding PLOF.  Pt reports he was independent with short distance ambulation without use of AD.  This date he requires x 2 assist for bed mobility and partial transfers/lateral scooting. Will continue to follow and progress as able.     Update: OT called and spoke with RN at [facility]. Pt requires total A for all ADLs excluding feeding, does not ambulate, mostly bed bound, but t/fs to w/c with jorge luis lift. Pt with no further PT needs at this time. D/C PT

## 2024-02-15 NOTE — ASSESSMENT & PLAN NOTE
Chronic anemia that has slowly progressed since 2015  Hgb stable, no signs of acute blood loss, brown stool, CTA negative   Advance diet as tolerated   Discussed EGD/Colonoscopy again today, patient declined and would like to avoid invasive procedures. I discussed this with his son as well.

## 2024-02-15 NOTE — PT/OT/SLP EVAL
"Occupational Therapy   Evaluation/Treatment    Name: Axel Burgos  MRN: [id number]  Admitting Diagnosis: GI bleed  Recent Surgery: * No surgery found *      Recommendations:     Discharge Recommendations:  (return to [facility] wt prior level of care)  Discharge Equipment Recommendations:  to be determined by next level of care  Barriers to discharge:  None    Assessment:     Axel Burgos is a 78 y.o. male with a medical diagnosis of GI bleed.  He presents with The primary encounter diagnosis was Hypovolemic shock. Diagnoses of GI bleed and Anemia, blood loss were also pertinent to this visit.  . Performance deficits affecting function: weakness, impaired joint extensibility, impaired endurance, impaired cognition, decreased ROM, impaired self care skills, impaired coordination, decreased upper extremity function, decreased coordination, impaired functional mobility, gait instability, impaired balance, decreased safety awareness, decreased lower extremity function, impaired skin, impaired fine motor.      Pt seen for initial evaluation this AM. Pt requiring increased assist for all axs; unable to stand and/or perform functional mobility. Called and spoke with nsg at [facility]'s home- pt requires total A for all ADLs excluding feeding, does not ambulate, mostly bed bound, but t/fs to w/c with jorge luis lift. Pt with no further OT needs at this time. D/c OT     Rehab Prognosis: Poor; patient would benefit from acute skilled OT services to address these deficits and reach maximum level of function.       Plan:     Patient to be seen  (d/c OT) to address the above listed problems via    Plan of Care Expires: 02/15/24  Plan of Care Reviewed with: patient    Subjective     Chief Complaint: pt states, " Not too good" when asked how he is feeling   Patient/Family Comments/goals: none stated     Occupational Profile:  Living Environment: pt not able to provide PLOF at time of eval. States, " I like to rent" when asked about his home " environment; called NH and spoke with pt's nurse- pt does not ambulate, is bed bound, uses jorge luis lift for t/fs to w/c, requires assist all ADLs excluding feeding   Equipment Used at Home: wheelchair, hospital bed, lift device  Assistance upon Discharge: from NH staff     Pain/Comfort:  Pain Rating 1: 0/10    Patients cultural, spiritual, Gnosticism conflicts given the current situation:      Objective:     Communicated with: nsg prior to session.  Patient found supine with   upon OT entry to room.    General Precautions: Standard, fall  Orthopedic Precautions: N/A  Braces: N/A  Respiratory Status: Room air    Occupational Performance:    Bed Mobility:    Patient completed Rolling/Turning to Left with  moderate assistance and maximal assistance  Patient completed Rolling/Turning to Right with moderate assistance and maximal assistance  Patient completed Scooting/Bridging with maximal assistance and 2 persons  Patient completed Supine to Sit with maximal assistance and 2 persons  Patient completed Sit to Supine with maximal assistance and 2 persons    Functional Mobility/Transfers:  Pt unable to come to full stand despite max A of 2 and multiple attempts; increased assist for lateral seated scooting to HOB     Activities of Daily Living:  Lower Body Dressing: total assistance    Toileting: total assistance      Cognitive/Visual Perceptual:  Pt oriented to self and place  Impaired insight, LTM, STM, and recall     Physical Exam:  Balance:    -       poor seated   Postural examination/scapula alignment:    -       Rounded shoulders  -       Forward head  -       Abnormal trunk flexion  -       Kyphosis  Skin integrity: Bruising of L hand   Upper Extremity Range of Motion:   BUE grossly limited at shoulders with AA/PROM   Upper Extremity Strength:  significantly limited based on observed function; unable to actively participate at this time in testing    Strength:  grossly 3 to 3+/5 based on function    [facility] 6 Click  ADL:  AMPAC Total Score: 10    Treatment & Education:  Pt found supine   Axs as above  Pt with impaired sitting balance   Attempted to stand x 2 trials; pt unable to complete/achieve full stance and limited buttocks clearance noted; lateral seated scooting to HOB   Returned to supine  Pt soiled in BM ; total A for hygiene and clothing management   Pt incontinent and having continuous movement; left in R side lying with clean brief and nursing notified     Patient left right sidelying with all lines intact, call button in reach, and nsg notified    GOALS:   Multidisciplinary Problems       Occupational Therapy Goals          Problem: Occupational Therapy    Goal Priority Disciplines Outcome Interventions   Occupational Therapy Goal     OT, PT/OT Adequate for Care Transition                        History:     Past Medical History:   Diagnosis Date    Anticoagulant long-term use     Diabetes mellitus     Hypertension     Stroke     Varicose veins          Past Surgical History:   Procedure Laterality Date    VARICOSE VEIN SURGERY         Time Tracking:     OT Date of Treatment: 02/15/24  OT Start Time: 0907  OT Stop Time: 0936  OT Total Time (min): 29 min    Billable Minutes:Evaluation 8  Self Care/Home Management 11  Therapeutic Activity 10    2/15/2024

## 2024-02-15 NOTE — SUBJECTIVE & OBJECTIVE
Subjective:     Interval History: Patient seen and examined this morning. Offered endoscopy yesterday, patient does not wish to move forward with any invasive procedures. Discussed this with this son this morning as well. No N/V or abdominal pain. Loose brown stool this AM.     Review of Systems   Constitutional:  Negative for chills, fatigue and fever.   HENT:  Negative for mouth sores, sore throat and trouble swallowing.    Gastrointestinal:  Negative for abdominal pain, blood in stool, nausea and vomiting.   All other systems reviewed and are negative.    Objective:     Vital Signs (Most Recent):  Temp: 98.5 °F (36.9 °C) (02/15/24 0803)  Pulse: 96 (02/15/24 0803)  Resp: 18 (02/15/24 0243)  BP: (!) 105/55 (02/15/24 0803)  SpO2: 99 % (02/15/24 0803) Vital Signs (24h Range):  Temp:  [97.6 °F (36.4 °C)-98.5 °F (36.9 °C)] 98.5 °F (36.9 °C)  Pulse:  [] 96  Resp:  [11-20] 18  SpO2:  [96 %-100 %] 99 %  BP: ()/(51-85) 105/55        There is no height or weight on file to calculate BMI.    No intake or output data in the 24 hours ending 02/15/24 1031    Lines/Drains/Airways       Peripheral Intravenous Line  Duration                  Peripheral IV - Single Lumen 02/14/24 1305 20 G Right;Lateral Antecubital <1 day                     Physical Exam  Vitals and nursing note reviewed.   Constitutional:       General: He is not in acute distress.     Appearance: Normal appearance. He is not toxic-appearing.   HENT:      Head: Normocephalic and atraumatic.   Eyes:      General: No scleral icterus.     Extraocular Movements: Extraocular movements intact.   Cardiovascular:      Rate and Rhythm: Normal rate and regular rhythm.   Pulmonary:      Effort: Pulmonary effort is normal.      Breath sounds: Normal breath sounds. No wheezing.   Abdominal:      General: There is no distension.      Palpations: Abdomen is soft.      Tenderness: There is no abdominal tenderness. There is no guarding.   Skin:     General: Skin  is warm and dry.      Findings: Bruising present.   Neurological:      Mental Status: He is alert.          Significant Labs:  CBC:   Recent Labs   Lab 02/14/24  0751 02/14/24  1822 02/15/24  0606   WBC 10.31 16.28* 10.61   HGB 7.2* 8.0* 8.2*   HCT 24.1* 25.6* 27.0*    272 307     CMP:   Recent Labs   Lab 02/14/24  0751 02/15/24  0733   * 149*   CALCIUM 9.1 8.8   ALBUMIN 2.5*  --    PROT 6.5  --     136   K 5.4* 4.7   CO2 21* 20*    106   BUN 25* 24*   CREATININE 1.1 1.1   ALKPHOS 85  --    ALT 10  --    AST 23  --    BILITOT 0.7  --          Significant Imaging:  Imaging results within the past 24 hours have been reviewed. CT angio A/P with no extravasation. Stool in rectum, potential stercoral colitis

## 2024-02-16 LAB
ANION GAP SERPL CALC-SCNC: 9 MMOL/L (ref 8–16)
ANISOCYTOSIS BLD QL SMEAR: SLIGHT
BASOPHILS # BLD AUTO: 0.04 K/UL (ref 0–0.2)
BASOPHILS NFR BLD: 0.5 % (ref 0–1.9)
BLD PROD TYP BPU: NORMAL
BLOOD UNIT EXPIRATION DATE: NORMAL
BLOOD UNIT TYPE CODE: 6200
BLOOD UNIT TYPE: NORMAL
BUN SERPL-MCNC: 16 MG/DL (ref 8–23)
CALCIUM SERPL-MCNC: 8.5 MG/DL (ref 8.7–10.5)
CHLORIDE SERPL-SCNC: 107 MMOL/L (ref 95–110)
CO2 SERPL-SCNC: 21 MMOL/L (ref 23–29)
CODING SYSTEM: NORMAL
CREAT SERPL-MCNC: 1 MG/DL (ref 0.5–1.4)
CROSSMATCH INTERPRETATION: NORMAL
DIFFERENTIAL METHOD BLD: ABNORMAL
DISPENSE STATUS: NORMAL
EOSINOPHIL # BLD AUTO: 0.1 K/UL (ref 0–0.5)
EOSINOPHIL NFR BLD: 1.4 % (ref 0–8)
ERYTHROCYTE [DISTWIDTH] IN BLOOD BY AUTOMATED COUNT: 18.9 % (ref 11.5–14.5)
EST. GFR  (NO RACE VARIABLE): >60 ML/MIN/1.73 M^2
GLUCOSE SERPL-MCNC: 104 MG/DL (ref 70–110)
HCT VFR BLD AUTO: 21.9 % (ref 40–54)
HGB BLD-MCNC: 6.9 G/DL (ref 14–18)
HYPOCHROMIA BLD QL SMEAR: ABNORMAL
IMM GRANULOCYTES # BLD AUTO: 0.07 K/UL (ref 0–0.04)
IMM GRANULOCYTES NFR BLD AUTO: 0.8 % (ref 0–0.5)
LYMPHOCYTES # BLD AUTO: 1.6 K/UL (ref 1–4.8)
LYMPHOCYTES NFR BLD: 18.6 % (ref 18–48)
MCH RBC QN AUTO: 28.3 PG (ref 27–31)
MCHC RBC AUTO-ENTMCNC: 31.5 G/DL (ref 32–36)
MCV RBC AUTO: 90 FL (ref 82–98)
MONOCYTES # BLD AUTO: 0.8 K/UL (ref 0.3–1)
MONOCYTES NFR BLD: 9.3 % (ref 4–15)
NEUTROPHILS # BLD AUTO: 6 K/UL (ref 1.8–7.7)
NEUTROPHILS NFR BLD: 69.4 % (ref 38–73)
NRBC BLD-RTO: 0 /100 WBC
OVALOCYTES BLD QL SMEAR: ABNORMAL
PLATELET # BLD AUTO: 240 K/UL (ref 150–450)
PLATELET BLD QL SMEAR: ABNORMAL
PMV BLD AUTO: 9.7 FL (ref 9.2–12.9)
POCT GLUCOSE: 107 MG/DL (ref 70–110)
POCT GLUCOSE: 92 MG/DL (ref 70–110)
POCT GLUCOSE: 94 MG/DL (ref 70–110)
POCT GLUCOSE: 96 MG/DL (ref 70–110)
POIKILOCYTOSIS BLD QL SMEAR: SLIGHT
POLYCHROMASIA BLD QL SMEAR: ABNORMAL
POTASSIUM SERPL-SCNC: 4.5 MMOL/L (ref 3.5–5.1)
RBC # BLD AUTO: 2.44 M/UL (ref 4.6–6.2)
SODIUM SERPL-SCNC: 137 MMOL/L (ref 136–145)
TARGETS BLD QL SMEAR: ABNORMAL
TRANS ERYTHROCYTES VOL PATIENT: NORMAL ML
WBC # BLD AUTO: 8.64 K/UL (ref 3.9–12.7)

## 2024-02-16 PROCEDURE — 63600175 PHARM REV CODE 636 W HCPCS: Mod: HCNC | Performed by: FAMILY MEDICINE

## 2024-02-16 PROCEDURE — C9113 INJ PANTOPRAZOLE SODIUM, VIA: HCPCS | Mod: HCNC | Performed by: FAMILY MEDICINE

## 2024-02-16 PROCEDURE — 25000003 PHARM REV CODE 250: Mod: HCNC | Performed by: STUDENT IN AN ORGANIZED HEALTH CARE EDUCATION/TRAINING PROGRAM

## 2024-02-16 PROCEDURE — 21400001 HC TELEMETRY ROOM: Mod: HCNC

## 2024-02-16 PROCEDURE — 36430 TRANSFUSION BLD/BLD COMPNT: CPT

## 2024-02-16 PROCEDURE — 80048 BASIC METABOLIC PNL TOTAL CA: CPT | Mod: HCNC | Performed by: STUDENT IN AN ORGANIZED HEALTH CARE EDUCATION/TRAINING PROGRAM

## 2024-02-16 PROCEDURE — 86920 COMPATIBILITY TEST SPIN: CPT | Mod: HCNC | Performed by: STUDENT IN AN ORGANIZED HEALTH CARE EDUCATION/TRAINING PROGRAM

## 2024-02-16 PROCEDURE — 85025 COMPLETE CBC W/AUTO DIFF WBC: CPT | Mod: HCNC | Performed by: FAMILY MEDICINE

## 2024-02-16 PROCEDURE — P9021 RED BLOOD CELLS UNIT: HCPCS | Mod: HCNC | Performed by: STUDENT IN AN ORGANIZED HEALTH CARE EDUCATION/TRAINING PROGRAM

## 2024-02-16 PROCEDURE — G0425 INPT/ED TELECONSULT30: HCPCS | Mod: HCNC,95,, | Performed by: PSYCHIATRY & NEUROLOGY

## 2024-02-16 RX ORDER — ASCORBIC ACID 500 MG
500 TABLET ORAL DAILY
Status: DISCONTINUED | OUTPATIENT
Start: 2024-02-17 | End: 2024-02-20 | Stop reason: HOSPADM

## 2024-02-16 RX ORDER — HYDROCODONE BITARTRATE AND ACETAMINOPHEN 500; 5 MG/1; MG/1
TABLET ORAL
Status: DISCONTINUED | OUTPATIENT
Start: 2024-02-16 | End: 2024-02-20 | Stop reason: HOSPADM

## 2024-02-16 RX ORDER — ATORVASTATIN CALCIUM 20 MG/1
20 TABLET, FILM COATED ORAL DAILY
Status: DISCONTINUED | OUTPATIENT
Start: 2024-02-17 | End: 2024-02-20 | Stop reason: HOSPADM

## 2024-02-16 RX ORDER — LANOLIN ALCOHOL/MO/W.PET/CERES
1 CREAM (GRAM) TOPICAL DAILY
Status: DISCONTINUED | OUTPATIENT
Start: 2024-02-17 | End: 2024-02-20 | Stop reason: HOSPADM

## 2024-02-16 RX ORDER — CHOLECALCIFEROL (VITAMIN D3) 25 MCG
1000 TABLET ORAL DAILY
Status: DISCONTINUED | OUTPATIENT
Start: 2024-02-17 | End: 2024-02-20 | Stop reason: HOSPADM

## 2024-02-16 RX ADMIN — PANTOPRAZOLE SODIUM 40 MG: 40 INJECTION, POWDER, LYOPHILIZED, FOR SOLUTION INTRAVENOUS at 09:02

## 2024-02-16 RX ADMIN — POLYETHYLENE GLYCOL 3350 17 G: 17 POWDER, FOR SOLUTION ORAL at 09:02

## 2024-02-16 RX ADMIN — SODIUM CHLORIDE, POTASSIUM CHLORIDE, SODIUM LACTATE AND CALCIUM CHLORIDE: 600; 310; 30; 20 INJECTION, SOLUTION INTRAVENOUS at 09:02

## 2024-02-16 RX ADMIN — SODIUM CHLORIDE, POTASSIUM CHLORIDE, SODIUM LACTATE AND CALCIUM CHLORIDE: 600; 310; 30; 20 INJECTION, SOLUTION INTRAVENOUS at 08:02

## 2024-02-16 RX ADMIN — PANTOPRAZOLE SODIUM 40 MG: 40 INJECTION, POWDER, LYOPHILIZED, FOR SOLUTION INTRAVENOUS at 08:02

## 2024-02-16 NOTE — PLAN OF CARE
[id number]  Updated notes manually faxed to Michelle ([phone]) w/the [facility]. Awaiting response.     1030  Patient resting quietly in bed when CM rounded. No family at the bedside. Pt was admitted with a GI bleed & is being followed by GI and PT/OT. Awaiting lab results.     Patient is a resident at the [facility], is dependent of all ADLs, & will need assistance with ambulance transportation at time of discharge.        02/16/24 1100   Rounds   Attendance Nurse ;Provider   Discharge Plan A Return to nursing home  ([facility])   Why the patient remains in the hospital Requires continued medical care   Transition of Care Barriers Transportation       CM was informed by Dr Alfred that the pt & son have declined EGD/colonoscopy and would like to avoid invasive procedures, & that she will discuss restarting pt's eliquis with GI.     1400  Order noted to transfuse 1U PRBC for H&H 6.9/21.9 this afternoon.       Will continue to follow.

## 2024-02-16 NOTE — ASSESSMENT & PLAN NOTE
Patient's anemia is currently controlled. Has not received any PRBCs to date. Etiology likely d/t chronic blood loss  Current CBC reviewed-   Lab Results   Component Value Date    HGB 6.9 (L) 02/16/2024    HCT 21.9 (L) 02/16/2024     Monitor serial CBC and transfuse if patient becomes hemodynamically unstable, symptomatic or H/H drops below 7/21.

## 2024-02-16 NOTE — SUBJECTIVE & OBJECTIVE
Interval History: Patient is seen and evaluated at bedside this morning.  No acute events overnight.  Denies any acute complaints.      His hemoglobin today lab from 8.2 yesterday to 6.9 today and he was consented for blood transfusion.  Patient appear to have poor insight into his medical condition and needs to be evaluated for competency.  Patient's son Axel Wong updated on [phone]     Review of Systems   Constitutional:  Positive for fatigue. Negative for chills and fever.   HENT:  Negative for congestion and nosebleeds.    Eyes:  Negative for pain and discharge.   Respiratory:  Negative for chest tightness, shortness of breath and wheezing.    Cardiovascular:  Negative for chest pain, palpitations and leg swelling.   Gastrointestinal:  Positive for abdominal distention and blood in stool. Negative for abdominal pain, constipation, diarrhea, nausea and vomiting.   Genitourinary:  Negative for dysuria, flank pain and hematuria.   Musculoskeletal:  Negative for joint swelling.   Neurological:  Negative for seizures and speech difficulty.   Psychiatric/Behavioral:  Negative for agitation and confusion.      Objective:     Vital Signs (Most Recent):  Temp: 97.9 °F (36.6 °C) (02/16/24 1156)  Pulse: 67 (02/16/24 1156)  Resp: 17 (02/16/24 1156)  BP: (!) 94/50 (02/16/24 1156)  SpO2: 96 % (02/16/24 0720) Vital Signs (24h Range):  Temp:  [97.3 °F (36.3 °C)-98.2 °F (36.8 °C)] 97.9 °F (36.6 °C)  Pulse:  [60-92] 67  Resp:  [16-18] 17  SpO2:  [95 %-100 %] 96 %  BP: ()/(50-70) 94/50     Weight: 64.5 kg (142 lb 3.2 oz)  Body mass index is 18.26 kg/m².    Intake/Output Summary (Last 24 hours) at 2/16/2024 1419  Last data filed at 2/16/2024 1300  Gross per 24 hour   Intake 2456 ml   Output --   Net 2456 ml         Physical Exam  Constitutional:       General: He is not in acute distress.     Appearance: Normal appearance. He is obese. He is not ill-appearing.   HENT:      Head: Normocephalic.      Right Ear: External  ear normal.      Left Ear: External ear normal.      Nose: Nose normal.      Mouth/Throat:      Mouth: Mucous membranes are moist.   Eyes:      Conjunctiva/sclera: Conjunctivae normal.      Pupils: Pupils are equal, round, and reactive to light.   Cardiovascular:      Rate and Rhythm: Normal rate and regular rhythm.   Pulmonary:      Effort: Pulmonary effort is normal. No respiratory distress.      Breath sounds: Normal breath sounds. No wheezing.   Abdominal:      General: Bowel sounds are normal. There is distension.      Palpations: Abdomen is soft.      Tenderness: There is no abdominal tenderness.   Musculoskeletal:         General: No swelling. Normal range of motion.      Cervical back: Neck supple.   Skin:     General: Skin is warm.      Capillary Refill: Capillary refill takes less than 2 seconds.      Findings: No erythema.   Neurological:      General: No focal deficit present.      Mental Status: He is oriented to person, place, and time. Mental status is at baseline.   Psychiatric:         Mood and Affect: Mood normal.         Behavior: Behavior normal.             Significant Labs: All pertinent labs within the past 24 hours have been reviewed.    Significant Imaging: I have reviewed all pertinent imaging results/findings within the past 24 hours.

## 2024-02-16 NOTE — ASSESSMENT & PLAN NOTE
Started on MiraLax daily  If no bowel movement tomorrow we will increase to b.i.d.    2/16  -having bowel movement

## 2024-02-16 NOTE — PLAN OF CARE
Pt is disoriented to time and situation. Pt reported no pain or nausea during shift. 1 unit of RBC's administered. LR infusing at 100. Care administered as ordered.

## 2024-02-16 NOTE — ASSESSMENT & PLAN NOTE
Positive stool occult blood  Hold long term oral anticoagulant  Trend H/H   NPO  Trend to keep Hgb> 7  Protonix IV  Consult GI    2/15  -evaluated by GI we will plan for endoscopy however patient has declined and son is aware  -we will reach out to GI tomorrow to see if it is safe to resume Eliquis    2/16  -hemoglobin dropped from 8.2-6.9  -consented for blood transfusion  -transfusing 1 unit of PRBC  -holding anticoagulation  -patient seen to have very poor insight into his medical condition, Psychiatry and palliative Care consulted.

## 2024-02-16 NOTE — ASSESSMENT & PLAN NOTE
Chronic, uncontrolled. Latest blood pressure and vitals reviewed-     Temp:  [97.3 °F (36.3 °C)-98.2 °F (36.8 °C)]   Pulse:  [60-92]   Resp:  [16-18]   BP: ()/(50-70)   SpO2:  [95 %-100 %] .   Home meds for hypertension were reviewed and noted below.   Hypertension Medications               amLODIPine (NORVASC) 2.5 MG tablet Take 2.5 mg by mouth once daily.    lisinopriL (PRINIVIL,ZESTRIL) 5 MG tablet Take 5 mg by mouth once daily.            While in the hospital, will manage blood pressure as follows; Adjust home antihypertensive regimen as follows- hold due to hypotension on admit

## 2024-02-16 NOTE — PROGRESS NOTES
[facility] Medicine  Progress Note    Patient Name: Axel Burgos  MRN: [id number]  Patient Class: IP- Inpatient   Admission Date: 2/14/2024  Length of Stay: 0 days  Attending Physician: Renee Montanez MD  Primary Care Provider: [facility]        Subjective:     Principal Problem:GI bleed        HPI:  Axel Burgos is a 78 y.o. male with  PMHx of DM2, CVA x2 on apixaban and ASA, HTN, and varicose veins presents to [facility] ED via EMS from [facility] for symptomatic anemia (hgb 6.0) and positive occult blood during routine testing. Pt denies ABD pain, N/V/D/C, CP, SOB, dizziness. States he has been eating, drinking, and taking PO meds w/o issue. ED workup significant for Hgb 7.2, K 5.4, BUN/Cr 25/1.1, CTA ABD shows fecal impaction and non-obstructing nephrolithiasis. Given one unit PRBCs, and IV protonix in ED. BP stable in low 100s. No active bleeding from rectum. Admit hospital medicine and consult GI for evaluation    Overview/Hospital Course:  No notes on file    Interval History: Patient is seen and evaluated at bedside this morning.  No acute events overnight.  Denies any acute complaints.      His hemoglobin today lab from 8.2 yesterday to 6.9 today and he was consented for blood transfusion.  Patient appear to have poor insight into his medical condition and needs to be evaluated for competency.  Patient's son Axel Wong updated on [phone]     Review of Systems   Constitutional:  Positive for fatigue. Negative for chills and fever.   HENT:  Negative for congestion and nosebleeds.    Eyes:  Negative for pain and discharge.   Respiratory:  Negative for chest tightness, shortness of breath and wheezing.    Cardiovascular:  Negative for chest pain, palpitations and leg swelling.   Gastrointestinal:  Positive for abdominal distention and blood in stool. Negative for abdominal pain, constipation, diarrhea, nausea and vomiting.   Genitourinary:  Negative for dysuria,  flank pain and hematuria.   Musculoskeletal:  Negative for joint swelling.   Neurological:  Negative for seizures and speech difficulty.   Psychiatric/Behavioral:  Negative for agitation and confusion.      Objective:     Vital Signs (Most Recent):  Temp: 97.9 °F (36.6 °C) (02/16/24 1156)  Pulse: 67 (02/16/24 1156)  Resp: 17 (02/16/24 1156)  BP: (!) 94/50 (02/16/24 1156)  SpO2: 96 % (02/16/24 0720) Vital Signs (24h Range):  Temp:  [97.3 °F (36.3 °C)-98.2 °F (36.8 °C)] 97.9 °F (36.6 °C)  Pulse:  [60-92] 67  Resp:  [16-18] 17  SpO2:  [95 %-100 %] 96 %  BP: ()/(50-70) 94/50     Weight: 64.5 kg (142 lb 3.2 oz)  Body mass index is 18.26 kg/m².    Intake/Output Summary (Last 24 hours) at 2/16/2024 1419  Last data filed at 2/16/2024 1300  Gross per 24 hour   Intake 2456 ml   Output --   Net 2456 ml         Physical Exam  Constitutional:       General: He is not in acute distress.     Appearance: Normal appearance. He is obese. He is not ill-appearing.   HENT:      Head: Normocephalic.      Right Ear: External ear normal.      Left Ear: External ear normal.      Nose: Nose normal.      Mouth/Throat:      Mouth: Mucous membranes are moist.   Eyes:      Conjunctiva/sclera: Conjunctivae normal.      Pupils: Pupils are equal, round, and reactive to light.   Cardiovascular:      Rate and Rhythm: Normal rate and regular rhythm.   Pulmonary:      Effort: Pulmonary effort is normal. No respiratory distress.      Breath sounds: Normal breath sounds. No wheezing.   Abdominal:      General: Bowel sounds are normal. There is distension.      Palpations: Abdomen is soft.      Tenderness: There is no abdominal tenderness.   Musculoskeletal:         General: No swelling. Normal range of motion.      Cervical back: Neck supple.   Skin:     General: Skin is warm.      Capillary Refill: Capillary refill takes less than 2 seconds.      Findings: No erythema.   Neurological:      General: No focal deficit present.      Mental Status: He  is oriented to person, place, and time. Mental status is at baseline.   Psychiatric:         Mood and Affect: Mood normal.         Behavior: Behavior normal.             Significant Labs: All pertinent labs within the past 24 hours have been reviewed.    Significant Imaging: I have reviewed all pertinent imaging results/findings within the past 24 hours.    Assessment/Plan:      * GI bleed  Positive stool occult blood  Hold long term oral anticoagulant  Trend H/H   NPO  Trend to keep Hgb> 7  Protonix IV  Consult GI    2/15  -evaluated by GI we will plan for endoscopy however patient has declined and son is aware  -we will reach out to GI tomorrow to see if it is safe to resume Eliquis    2/16  -hemoglobin dropped from 8.2-6.9  -consented for blood transfusion  -transfusing 1 unit of PRBC  -holding anticoagulation  -patient seen to have very poor insight into his medical condition, Psychiatry and palliative Care consulted.        Chronic idiopathic constipation  Started on MiraLax daily  If no bowel movement tomorrow we will increase to b.i.d.    2/16  -having bowel movement    Normocytic anemia  Patient's anemia is currently controlled. Has not received any PRBCs to date. Etiology likely d/t chronic blood loss  Current CBC reviewed-   Lab Results   Component Value Date    HGB 6.9 (L) 02/16/2024    HCT 21.9 (L) 02/16/2024     Monitor serial CBC and transfuse if patient becomes hemodynamically unstable, symptomatic or H/H drops below 7/21.    Symptomatic anemia  Patient's anemia is currently controlled. Has not received any PRBCs to date. Etiology likely d/t chronic blood loss  Current CBC reviewed-   Lab Results   Component Value Date    HGB 6.9 (L) 02/16/2024    HCT 21.9 (L) 02/16/2024     Monitor serial CBC and transfuse if patient becomes hemodynamically unstable, symptomatic or H/H drops below 7/21.    Acute blood loss anemia  Anemia of chronic disease  Symptomatic anemia  Hypotensive on admit    Patient's anemia  is currently uncontrolled. Has received 1 units of PRBCs on 2/4 in the ED on arrival . Etiology likely d/t acute blood loss which was from GI bleed  Current CBC reviewed-   Lab Results   Component Value Date    HGB 6.9 (L) 02/16/2024    HCT 21.9 (L) 02/16/2024     Monitor serial CBC and transfuse if patient becomes hemodynamically unstable, symptomatic or H/H drops below 7/21.    Anticoagulant long-term use  This patient has long term use on an anticoagulant with Select Anticoagulant(s): Direct oral anticoagulant: Apixaban (Eliquis). Their long term anticoagulation will be Held or Continued: held. They are on long term anticoagulation due to Reason for Anticoagulation: Stroke prevention.   Hold due to GI bleed    2/16  Hemoglobin dropped to 6.9 from 8.2  Holding anticoagulation    Mild protein malnutrition  Nutrition consulted. Most recent weight and BMI monitored-     Measurements:  Wt Readings from Last 1 Encounters:   02/16/24 64.5 kg (142 lb 3.2 oz)   Body mass index is 18.26 kg/m².    Patient has been screened and assessed by RD.    Malnutrition Type:  Context:    Level:      Malnutrition Characteristic Summary:       Interventions/Recommendations (treatment strategy):         Anemia of chronic disease  Patient's anemia is currently controlled. Has not received any PRBCs to date. Etiology likely d/t chronic blood loss  Current CBC reviewed-   Lab Results   Component Value Date    HGB 6.9 (L) 02/16/2024    HCT 21.9 (L) 02/16/2024     Monitor serial CBC and transfuse if patient becomes hemodynamically unstable, symptomatic or H/H drops below 7/21.    Type 2 diabetes mellitus, controlled  Patient's FSGs are controlled on current medication regimen.  Last A1c reviewed-   Lab Results   Component Value Date    HGBA1C 5.0 02/14/2024     Most recent fingerstick glucose reviewed-   Recent Labs   Lab 02/15/24  1734 02/15/24  2050 02/16/24  0536 02/16/24  1158   POCTGLUCOSE 143* 138* 92 94       Current correctional scale   Low  Maintain anti-hyperglycemic dose as follows-   Antihyperglycemics (From admission, onward)      Start     Stop Route Frequency Ordered    02/14/24 1507  insulin aspart U-100 pen 0-5 Units         -- SubQ Before meals & nightly PRN 02/14/24 1407          Hold Oral hypoglycemics while patient is in the hospital- not on home oral antiglycemia    Essential hypertension  Chronic, uncontrolled. Latest blood pressure and vitals reviewed-     Temp:  [97.3 °F (36.3 °C)-98.2 °F (36.8 °C)]   Pulse:  [60-92]   Resp:  [16-18]   BP: ()/(50-70)   SpO2:  [95 %-100 %] .   Home meds for hypertension were reviewed and noted below.   Hypertension Medications               amLODIPine (NORVASC) 2.5 MG tablet Take 2.5 mg by mouth once daily.    lisinopriL (PRINIVIL,ZESTRIL) 5 MG tablet Take 5 mg by mouth once daily.            While in the hospital, will manage blood pressure as follows; Adjust home antihypertensive regimen as follows- hold due to hypotension on admit      Chronic venous insufficiency  chronic        VTE Risk Mitigation (From admission, onward)           Ordered     IP VTE HIGH RISK PATIENT  Once         02/14/24 1407     Place sequential compression device  Until discontinued         02/14/24 1407                    Discharge Planning   JESSI:      Code Status: Full Code   Is the patient medically ready for discharge?:     Reason for patient still in hospital (select all that apply): Treatment and Consult recommendations  Discharge Plan A: Return to nursing home      Inpatient Upgrade Note Axel Burgos has warranted treatment spanning two or more midnights of hospital level care for the management of GI bleed. He continues to require IV fluids, blood transfusion, daily labs ane medication adjustments. His condition is also complicated by the following comorbidities: Hypertension, Diabetes, and hx of CVA, renal calculus, left hydronephrosis, diverticulosis.             Renee Montanez MD  Department of  [facility]

## 2024-02-16 NOTE — CONSULTS
Palliative Care Consult Note:    Consult received.  Medical record reviewed,discussed with referring provider, Dr. Montanez . Physician's goal for PM consult is goals of care. Limited availability today due to short staffing. Full consult with Palliative Medicine provider to follow on Monday if pt remains admitted. If discharged over the weekend feel free to place referral for a new pt palliative clinic visit.    Thank you for allowing the Palliative Medicine team  to be apart of this patient's care.    Pedrito Faulkner MD  Director of Hospice and Palliative Medicine  [facility]  [place]

## 2024-02-16 NOTE — PLAN OF CARE
Patient resting in bed, oriented to self and place. IVF infusing as ordered. Medication administered as ordered. No complaints of pain or discomfort. Asleep through the night. Encouraged to call with needs or concerns. Will continue to monitor.

## 2024-02-16 NOTE — ASSESSMENT & PLAN NOTE
This patient has long term use on an anticoagulant with Select Anticoagulant(s): Direct oral anticoagulant: Apixaban (Eliquis). Their long term anticoagulation will be Held or Continued: held. They are on long term anticoagulation due to Reason for Anticoagulation: Stroke prevention.   Hold due to GI bleed    2/16  Hemoglobin dropped to 6.9 from 8.2  Holding anticoagulation

## 2024-02-16 NOTE — ASSESSMENT & PLAN NOTE
Patient's FSGs are controlled on current medication regimen.  Last A1c reviewed-   Lab Results   Component Value Date    HGBA1C 5.0 02/14/2024     Most recent fingerstick glucose reviewed-   Recent Labs   Lab 02/15/24  1734 02/15/24  2050 02/16/24  0536 02/16/24  1158   POCTGLUCOSE 143* 138* 92 94       Current correctional scale  Low  Maintain anti-hyperglycemic dose as follows-   Antihyperglycemics (From admission, onward)    Start     Stop Route Frequency Ordered    02/14/24 1507  insulin aspart U-100 pen 0-5 Units         -- SubQ Before meals & nightly PRN 02/14/24 1407        Hold Oral hypoglycemics while patient is in the hospital- not on home oral antiglycemia

## 2024-02-16 NOTE — ASSESSMENT & PLAN NOTE
Anemia of chronic disease  Symptomatic anemia  Hypotensive on admit    Patient's anemia is currently uncontrolled. Has received 1 units of PRBCs on 2/4 in the ED on arrival . Etiology likely d/t acute blood loss which was from GI bleed  Current CBC reviewed-   Lab Results   Component Value Date    HGB 6.9 (L) 02/16/2024    HCT 21.9 (L) 02/16/2024     Monitor serial CBC and transfuse if patient becomes hemodynamically unstable, symptomatic or H/H drops below 7/21.

## 2024-02-16 NOTE — ASSESSMENT & PLAN NOTE
Nutrition consulted. Most recent weight and BMI monitored-     Measurements:  Wt Readings from Last 1 Encounters:   02/16/24 64.5 kg (142 lb 3.2 oz)   Body mass index is 18.26 kg/m².    Patient has been screened and assessed by RD.    Malnutrition Type:  Context:    Level:      Malnutrition Characteristic Summary:       Interventions/Recommendations (treatment strategy):

## 2024-02-17 PROBLEM — F03.90 DEMENTIA WITHOUT BEHAVIORAL DISTURBANCE: Status: ACTIVE | Noted: 2024-02-17

## 2024-02-17 LAB
ANION GAP SERPL CALC-SCNC: 7 MMOL/L (ref 8–16)
BASOPHILS # BLD AUTO: 0.05 K/UL (ref 0–0.2)
BASOPHILS NFR BLD: 0.6 % (ref 0–1.9)
BUN SERPL-MCNC: 16 MG/DL (ref 8–23)
CALCIUM SERPL-MCNC: 8.2 MG/DL (ref 8.7–10.5)
CHLORIDE SERPL-SCNC: 108 MMOL/L (ref 95–110)
CO2 SERPL-SCNC: 22 MMOL/L (ref 23–29)
CREAT SERPL-MCNC: 0.9 MG/DL (ref 0.5–1.4)
DIFFERENTIAL METHOD BLD: ABNORMAL
EOSINOPHIL # BLD AUTO: 0.3 K/UL (ref 0–0.5)
EOSINOPHIL NFR BLD: 3.7 % (ref 0–8)
ERYTHROCYTE [DISTWIDTH] IN BLOOD BY AUTOMATED COUNT: 18.2 % (ref 11.5–14.5)
EST. GFR  (NO RACE VARIABLE): >60 ML/MIN/1.73 M^2
GLUCOSE SERPL-MCNC: 83 MG/DL (ref 70–110)
HCT VFR BLD AUTO: 26.3 % (ref 40–54)
HGB BLD-MCNC: 8.4 G/DL (ref 14–18)
IMM GRANULOCYTES # BLD AUTO: 0.04 K/UL (ref 0–0.04)
IMM GRANULOCYTES NFR BLD AUTO: 0.5 % (ref 0–0.5)
LYMPHOCYTES # BLD AUTO: 1.8 K/UL (ref 1–4.8)
LYMPHOCYTES NFR BLD: 22.4 % (ref 18–48)
MAGNESIUM SERPL-MCNC: 1.8 MG/DL (ref 1.6–2.6)
MCH RBC QN AUTO: 29.3 PG (ref 27–31)
MCHC RBC AUTO-ENTMCNC: 31.9 G/DL (ref 32–36)
MCV RBC AUTO: 92 FL (ref 82–98)
MONOCYTES # BLD AUTO: 0.8 K/UL (ref 0.3–1)
MONOCYTES NFR BLD: 10.6 % (ref 4–15)
NEUTROPHILS # BLD AUTO: 4.9 K/UL (ref 1.8–7.7)
NEUTROPHILS NFR BLD: 62.2 % (ref 38–73)
NRBC BLD-RTO: 0 /100 WBC
PLATELET # BLD AUTO: 224 K/UL (ref 150–450)
PMV BLD AUTO: 10.1 FL (ref 9.2–12.9)
POCT GLUCOSE: 108 MG/DL (ref 70–110)
POCT GLUCOSE: 111 MG/DL (ref 70–110)
POCT GLUCOSE: 115 MG/DL (ref 70–110)
POCT GLUCOSE: 90 MG/DL (ref 70–110)
POTASSIUM SERPL-SCNC: 4.3 MMOL/L (ref 3.5–5.1)
RBC # BLD AUTO: 2.87 M/UL (ref 4.6–6.2)
SODIUM SERPL-SCNC: 137 MMOL/L (ref 136–145)
WBC # BLD AUTO: 7.89 K/UL (ref 3.9–12.7)

## 2024-02-17 PROCEDURE — 25000003 PHARM REV CODE 250: Mod: HCNC | Performed by: STUDENT IN AN ORGANIZED HEALTH CARE EDUCATION/TRAINING PROGRAM

## 2024-02-17 PROCEDURE — 80048 BASIC METABOLIC PNL TOTAL CA: CPT | Mod: HCNC | Performed by: STUDENT IN AN ORGANIZED HEALTH CARE EDUCATION/TRAINING PROGRAM

## 2024-02-17 PROCEDURE — 85025 COMPLETE CBC W/AUTO DIFF WBC: CPT | Mod: HCNC | Performed by: STUDENT IN AN ORGANIZED HEALTH CARE EDUCATION/TRAINING PROGRAM

## 2024-02-17 PROCEDURE — 21400001 HC TELEMETRY ROOM: Mod: HCNC

## 2024-02-17 PROCEDURE — 83735 ASSAY OF MAGNESIUM: CPT | Mod: HCNC | Performed by: STUDENT IN AN ORGANIZED HEALTH CARE EDUCATION/TRAINING PROGRAM

## 2024-02-17 PROCEDURE — C9113 INJ PANTOPRAZOLE SODIUM, VIA: HCPCS | Mod: HCNC | Performed by: FAMILY MEDICINE

## 2024-02-17 PROCEDURE — 36415 COLL VENOUS BLD VENIPUNCTURE: CPT | Mod: HCNC | Performed by: STUDENT IN AN ORGANIZED HEALTH CARE EDUCATION/TRAINING PROGRAM

## 2024-02-17 PROCEDURE — 63600175 PHARM REV CODE 636 W HCPCS: Mod: HCNC | Performed by: FAMILY MEDICINE

## 2024-02-17 RX ADMIN — SODIUM CHLORIDE, POTASSIUM CHLORIDE, SODIUM LACTATE AND CALCIUM CHLORIDE: 600; 310; 30; 20 INJECTION, SOLUTION INTRAVENOUS at 03:02

## 2024-02-17 RX ADMIN — FERROUS SULFATE TAB 325 MG (65 MG ELEMENTAL FE) 1 EACH: 325 (65 FE) TAB at 08:02

## 2024-02-17 RX ADMIN — PANTOPRAZOLE SODIUM 40 MG: 40 INJECTION, POWDER, LYOPHILIZED, FOR SOLUTION INTRAVENOUS at 08:02

## 2024-02-17 RX ADMIN — POLYETHYLENE GLYCOL 3350 17 G: 17 POWDER, FOR SOLUTION ORAL at 08:02

## 2024-02-17 RX ADMIN — ATORVASTATIN CALCIUM 20 MG: 20 TABLET, FILM COATED ORAL at 08:02

## 2024-02-17 RX ADMIN — OXYCODONE HYDROCHLORIDE AND ACETAMINOPHEN 500 MG: 500 TABLET ORAL at 08:02

## 2024-02-17 RX ADMIN — CHOLECALCIFEROL TAB 25 MCG (1000 UNIT) 1000 UNITS: 25 TAB at 08:02

## 2024-02-17 NOTE — ASSESSMENT & PLAN NOTE
Patient with dementia with likely etiology of unknown dementia. Dementia is moderate. The patient does not have signs of behavioral disturbance. Home dementia medications are Held or Continued:  Not on medication . Continue non-pharmacologic interventions to prevent delirium (No VS between 11PM-5AM, activity during day, opening blinds, providing glasses/hearing aids, and up in chair during daytime). Will avoid narcotics and benzos unless absolutely necessary. PRN anti-psychotics are not prescribed to avoid self harm behaviors.    Patient without behavior disturbance  Psychiatry evaluated patient to determine if he has capacity given his poor insight into his medical condition    Plan  -patient lacks capacity  -patient's son Axel Burgos ([phone]) who have POA to make decisions  -frequent reorientation as needed  -avoid and or minimal the use of opioid, anticholinergic, benzodiazepines or other sedating medication

## 2024-02-17 NOTE — ASSESSMENT & PLAN NOTE
This patient has long term use on an anticoagulant with Select Anticoagulant(s): Direct oral anticoagulant: Apixaban (Eliquis). Their long term anticoagulation will be Held or Continued: held. They are on long term anticoagulation due to Reason for Anticoagulation: Stroke prevention.   Hold due to GI bleed    2/16  Hemoglobin dropped to 6.9 from 8.2  Holding anticoagulation    2/17  -per chart review no note from Neurology or Cardiology regarding why patient is on anticoagulation  -there is a documented history of TIA/CVA, questioning if patient require full anticoagulation for CVA prevention especially in the setting of GI bleed  -neurology consulted

## 2024-02-17 NOTE — ASSESSMENT & PLAN NOTE
Chronic, uncontrolled. Latest blood pressure and vitals reviewed-     Temp:  [97.6 °F (36.4 °C)-98.2 °F (36.8 °C)]   Pulse:  []   Resp:  [17-20]   BP: ()/(52-76)   SpO2:  [94 %-100 %] .   Home meds for hypertension were reviewed and noted below.   Hypertension Medications               amLODIPine (NORVASC) 2.5 MG tablet Take 2.5 mg by mouth once daily.    lisinopriL (PRINIVIL,ZESTRIL) 5 MG tablet Take 5 mg by mouth once daily.            While in the hospital, will manage blood pressure as follows; Adjust home antihypertensive regimen as follows- hold due to hypotension on admit

## 2024-02-17 NOTE — ASSESSMENT & PLAN NOTE
Anemia of chronic disease  Symptomatic anemia  Hypotensive on admit    Patient's anemia is currently uncontrolled. Has received 1 units of PRBCs on 2/4 in the ED on arrival . Etiology likely d/t acute blood loss which was from GI bleed  Current CBC reviewed-   Lab Results   Component Value Date    HGB 8.4 (L) 02/17/2024    HCT 26.3 (L) 02/17/2024     Monitor serial CBC and transfuse if patient becomes hemodynamically unstable, symptomatic or H/H drops below 7/21.

## 2024-02-17 NOTE — SUBJECTIVE & OBJECTIVE
Interval History:  Patient is seen and evaluated at bedside this morning.  No acute events overnight.  Did not have any acute complaint.  Status post 1 unit of PRBC yesterday was stable H&H this morning.    Evaluated by psych, deem to not have capacity, son who have POA to make decisions.    Review of Systems   Constitutional:  Positive for fatigue. Negative for chills and fever.   HENT:  Negative for congestion and nosebleeds.    Eyes:  Negative for pain and discharge.   Respiratory:  Negative for chest tightness, shortness of breath and wheezing.    Cardiovascular:  Negative for chest pain, palpitations and leg swelling.   Gastrointestinal:  Positive for abdominal distention. Negative for abdominal pain, constipation, diarrhea, nausea and vomiting.   Genitourinary:  Negative for dysuria, flank pain and hematuria.   Musculoskeletal:  Negative for joint swelling.   Neurological:  Negative for seizures and speech difficulty.   Psychiatric/Behavioral:  Negative for agitation and confusion.      Objective:     Vital Signs (Most Recent):  Temp: 98 °F (36.7 °C) (02/17/24 1126)  Pulse: 76 (02/17/24 1126)  Resp: 20 (02/17/24 1126)  BP: 110/63 (02/17/24 1126)  SpO2: 96 % (02/17/24 1126) Vital Signs (24h Range):  Temp:  [97.6 °F (36.4 °C)-98.2 °F (36.8 °C)] 98 °F (36.7 °C)  Pulse:  [] 76  Resp:  [17-20] 20  SpO2:  [94 %-100 %] 96 %  BP: ()/(52-76) 110/63     Weight: 64.5 kg (142 lb 3.2 oz)  Body mass index is 18.26 kg/m².    Intake/Output Summary (Last 24 hours) at 2/17/2024 1448  Last data filed at 2/17/2024 0800  Gross per 24 hour   Intake 2638.94 ml   Output --   Net 2638.94 ml         Physical Exam  Constitutional:       General: He is not in acute distress.     Appearance: Normal appearance. He is ill-appearing.   HENT:      Head: Normocephalic.      Right Ear: External ear normal.      Left Ear: External ear normal.      Nose: Nose normal.      Mouth/Throat:      Mouth: Mucous membranes are moist.   Eyes:       Conjunctiva/sclera: Conjunctivae normal.      Pupils: Pupils are equal, round, and reactive to light.   Cardiovascular:      Rate and Rhythm: Normal rate and regular rhythm.   Pulmonary:      Effort: Pulmonary effort is normal. No respiratory distress.      Breath sounds: Normal breath sounds. No wheezing.   Abdominal:      General: Bowel sounds are normal. There is distension.      Palpations: Abdomen is soft.      Tenderness: There is no abdominal tenderness.   Musculoskeletal:         General: No swelling. Normal range of motion.      Cervical back: Neck supple.   Skin:     General: Skin is warm.      Capillary Refill: Capillary refill takes less than 2 seconds.      Findings: No erythema.   Neurological:      General: No focal deficit present.      Mental Status: He is oriented to person, place, and time. Mental status is at baseline.   Psychiatric:         Mood and Affect: Mood normal.         Behavior: Behavior normal.             Significant Labs: All pertinent labs within the past 24 hours have been reviewed.    Significant Imaging: I have reviewed all pertinent imaging results/findings within the past 24 hours.

## 2024-02-17 NOTE — ASSESSMENT & PLAN NOTE
Positive stool occult blood  Hold long term oral anticoagulant  Trend H/H   NPO  Trend to keep Hgb> 7  Protonix IV  Consult GI    2/15  -evaluated by GI we will plan for endoscopy however patient has declined and son is aware  -we will reach out to GI tomorrow to see if it is safe to resume Eliquis    2/16  -hemoglobin dropped from 8.2-6.9  -consented for blood transfusion  -transfusing 1 unit of PRBC  -holding anticoagulation  -patient seen to have very poor insight into his medical condition, Psychiatry and palliative Care consulted.    2/17  -H&H stable

## 2024-02-17 NOTE — PLAN OF CARE
Pt is disoriented to time, place, and situation. Pt reported no pain/nausea during shift. SCD's on, Triad cream applied to scrotum area. LR infusing at 100. Care administered as ordered.

## 2024-02-17 NOTE — CONSULTS
"[facility]  Psychiatry  Telepsychiatry Consult Note    Please see previous notes:    Patient agreeable to consultation via telepsychiatry.    Tele-Consultation from Psychiatry started: 2/16/2024 at 6:40pm  The chief complaint leading to psychiatric consultation is: depression  This consultation was requested by Dr Montanez, the Emergency Department attending physician.  The location of the consulting psychiatrist is  [place] .  The patient location is  [facility] MEDICAL SURGICAL UNIT*   The patient arrived at the ED at: [facility]    Also present with the patient at the time of the consultation: nobody    Patient Identification:   Axel Burgos is a 78 y.o. male.    Patient information was obtained from patient and past medical records.  Patient presented voluntarily to the Emergency Department     Consults  Teleconsult Time Documentation  Subjective:     History of Present Illness:  77yo male with hx of  DM2, CVA admitted for symptomatic anemia, GIB.     Per ED note-  "  Axel Burgos is a 78 y.o. male w/ PMHx of DM2, CVA x2 on apixaban and ASA, HTN, and varicose veins presents to [facility] ED via EMS from [facility] for symptomatic anemia (hgb 6.0) and positive occult blood during routine testing. Pt denies ABD pain, N/V/D/C, CP, SOB, dizziness. States he has been eating, drinking, and taking PO meds w/o issue. ED workup significant for Hgb 7.2, K 5.4, BUN/Cr 25/1.1, CTA ABD shows fecal impaction and non-obstructing nephrolithiasis. Given one unit PRBCs, and IV protonix in ED. BP stable in low 100s. No active bleeding from rectum.   "    On interview, patient could not recall which hospital he was in or why he was there. He reported his mood was fine. Could not recall his doctors or his medical conditions. I called patients son at [phone] who reports since his CVA 3 years ago he has had progressively more poor memory. Son has to help him with most things in the outpatient setting. Son reports no hx of mental " "illness.Son reports he visited father in the hospital yesterday and he was in good spirits.  No SI or HI  No psychotic sx  No aggression noted  No AVH noted.   Reports good sleep and fair appetite  Nursing reports patient has been cooperative this shift  This is the extent of patients complaints at this time  12 pt ros was negative aside from sx noted above        Psychiatric History:   Previous Psychiatric Hospitalizations: No   Previous Medication Trials: No   Previous Suicide Attempts: no   History of Violence: no  History of Depression: no  History of Yoly: no  History of Auditory/Visual Hallucination no  History of Delusions: no  Outpatient psychiatrist (current & past): No    Substance Abuse History:  Tobacco:No  Alcohol: No  Illicit Substances:No  Detox/Rehab: No    Legal History: Past charges/incarcerations: denied    Family Psychiatric History: denied      Social History:  . Lives on his own. Son lives nearby. Retired. Denied access to [employer]      Psychiatric Mental Status Exam:  Arousal: alert  Sensorium/Orientation: oriented to person  Behavior/Cooperation: cooperative   Speech: slowed, delayed, increased latency of response  Language: not tested  Mood: " okay "   Affect: appropriate  Thought Process: normal and logical  Thought Content:   Auditory hallucinations: NO  Visual hallucinations: NO  Paranoia: NO  Delusions:  NO  Suicidal ideation: NO  Homicidal ideation: NO  Attention/Concentration:  intact  Memory:    Recent:  Decreased   Remote: Intact     Fund of Knowledge: Impaired   Abstract reasoning: similarities were concrete  Insight: poor awareness of illness  Judgment:  poor      Past Medical History:   Past Medical History:   Diagnosis Date    Anticoagulant long-term use     Diabetes mellitus     Hypertension     Stroke     Varicose veins       Laboratory Data:   Labs Reviewed   CBC W/ AUTO DIFFERENTIAL - Abnormal; Notable for the following components:       Result Value    RBC 2.70 (*)  "    Hemoglobin 7.2 (*)     Hematocrit 24.1 (*)     MCH 26.7 (*)     MCHC 29.9 (*)     RDW 19.9 (*)     Gran % 77.0 (*)     Platelet Estimate Increased (*)     All other components within normal limits   COMPREHENSIVE METABOLIC PANEL - Abnormal; Notable for the following components:    Potassium 5.4 (*)     CO2 21 (*)     Glucose 123 (*)     BUN 25 (*)     Albumin 2.5 (*)     All other components within normal limits   OCCULT BLOOD X 1, STOOL - Abnormal; Notable for the following components:    Occult Blood Positive (*)     All other components within normal limits   CBC W/ AUTO DIFFERENTIAL - Abnormal; Notable for the following components:    WBC 16.28 (*)     RBC 2.81 (*)     Hemoglobin 8.0 (*)     Hematocrit 25.6 (*)     MCHC 31.3 (*)     RDW 18.1 (*)     Immature Granulocytes 2.1 (*)     Gran # (ANC) 13.5 (*)     Immature Grans (Abs) 0.34 (*)     Mono # 1.1 (*)     Gran % 83.0 (*)     Lymph % 8.1 (*)     All other components within normal limits   IRON AND TIBC - Abnormal; Notable for the following components:    Iron 192 (*)     Transferrin 149 (*)     TIBC 221 (*)     Saturated Iron 87 (*)     All other components within normal limits   CBC W/ AUTO DIFFERENTIAL - Abnormal; Notable for the following components:    RBC 2.95 (*)     Hemoglobin 8.2 (*)     Hematocrit 27.0 (*)     MCHC 30.4 (*)     RDW 18.7 (*)     Immature Granulocytes 1.8 (*)     Gran # (ANC) 8.5 (*)     Immature Grans (Abs) 0.19 (*)     Gran % 80.1 (*)     Lymph % 9.4 (*)     All other components within normal limits   BASIC METABOLIC PANEL - Abnormal; Notable for the following components:    CO2 20 (*)     Glucose 149 (*)     BUN 24 (*)     All other components within normal limits   POCT GLUCOSE - Abnormal; Notable for the following components:    POCT Glucose 137 (*)     All other components within normal limits   POCT GLUCOSE - Abnormal; Notable for the following components:    POCT Glucose 151 (*)     All other components within normal limits    POCT GLUCOSE - Abnormal; Notable for the following components:    POCT Glucose 132 (*)     All other components within normal limits   HEMOGLOBIN A1C   FERRITIN   VITAMIN B12   TYPE & SCREEN   POCT GLUCOSE MONITORING CONTINUOUS   PREPARE RBC SOFT           Allergies:   Review of patient's allergies indicates:  No Known Allergies    Medications in ER:   Medications   lactated ringers infusion ( Intravenous Stopped 2/16/24 1520)   pantoprazole injection 40 mg (40 mg Intravenous Given 2/16/24 0901)   glucose chewable tablet 16 g (has no administration in time range)   glucose chewable tablet 24 g (has no administration in time range)   glucagon (human recombinant) injection 1 mg (has no administration in time range)   insulin aspart U-100 pen 0-5 Units ( Subcutaneous Not Given 2/15/24 1745)   dextrose 10% bolus 125 mL 125 mL (has no administration in time range)   dextrose 10% bolus 250 mL 250 mL (has no administration in time range)   polyethylene glycol packet 17 g (17 g Oral Given 2/16/24 0901)   0.9%  NaCl infusion (for blood administration) (has no administration in time range)   ascorbic acid (vitamin C) tablet 500 mg (has no administration in time range)   atorvastatin tablet 20 mg (has no administration in time range)   ferrous sulfate tablet 1 each (has no administration in time range)   vitamin D 1000 units tablet 1,000 Units (has no administration in time range)   iohexoL (OMNIPAQUE 350) injection 130 mL (130 mLs Intravenous Given 2/14/24 1306)       Medications at home: reviewed in MAR    No new subjective & objective note has been filed under this hospital service since the last note was generated.      Assessment - Diagnosis - Goals:     Diagnosis/Impression:   Dementia without behavioral disturbance    Rec:   Lacks capacity to make medical decisions. Please utilize son as surrogate decision maker. Patient actually expressed this preference.  Reorientation as needed  Try to minimize use of psychoactive  medications including opioids, anticholinergics, benzodiazepines  Encourage son's presence at bedside if patient starts to become anxious or agitated, this will help calm him.    Time with patient, coordinating care: 36min      More than 50% of the time was spent counseling/coordinating care    Consulting clinician was informed of the encounter and consult note.    Consultation ended: 2/16/2024 at 7:32pm    Anand Meyer MD  Psychiatry  [facility]

## 2024-02-17 NOTE — PROGRESS NOTES
[facility] Medicine  Progress Note    Patient Name: Axel Burgos  MRN: [id number]  Patient Class: IP- Inpatient   Admission Date: 2/14/2024  Length of Stay: 1 days  Attending Physician: Renee Montanez MD  Primary Care Provider: Home, [facility] [place]        Subjective:     Principal Problem:GI bleed        HPI:  Axel Burgos is a 78 y.o. male with  PMHx of DM2, CVA x2 on apixaban and ASA, HTN, and varicose veins presents to [facility] ED via EMS from [facility] for symptomatic anemia (hgb 6.0) and positive occult blood during routine testing. Pt denies ABD pain, N/V/D/C, CP, SOB, dizziness. States he has been eating, drinking, and taking PO meds w/o issue. ED workup significant for Hgb 7.2, K 5.4, BUN/Cr 25/1.1, CTA ABD shows fecal impaction and non-obstructing nephrolithiasis. Given one unit PRBCs, and IV protonix in ED. BP stable in low 100s. No active bleeding from rectum. Admit hospital medicine and consult GI for evaluation    Overview/Hospital Course:  No notes on file    Interval History:  Patient is seen and evaluated at bedside this morning.  No acute events overnight.  Did not have any acute complaint.  Status post 1 unit of PRBC yesterday was stable H&H this morning.    Evaluated by psych, deem to not have capacity, son who have POA to make decisions.    Review of Systems   Constitutional:  Positive for fatigue. Negative for chills and fever.   HENT:  Negative for congestion and nosebleeds.    Eyes:  Negative for pain and discharge.   Respiratory:  Negative for chest tightness, shortness of breath and wheezing.    Cardiovascular:  Negative for chest pain, palpitations and leg swelling.   Gastrointestinal:  Positive for abdominal distention. Negative for abdominal pain, constipation, diarrhea, nausea and vomiting.   Genitourinary:  Negative for dysuria, flank pain and hematuria.   Musculoskeletal:  Negative for joint swelling.   Neurological:  Negative for seizures and speech  difficulty.   Psychiatric/Behavioral:  Negative for agitation and confusion.      Objective:     Vital Signs (Most Recent):  Temp: 98 °F (36.7 °C) (02/17/24 1126)  Pulse: 76 (02/17/24 1126)  Resp: 20 (02/17/24 1126)  BP: 110/63 (02/17/24 1126)  SpO2: 96 % (02/17/24 1126) Vital Signs (24h Range):  Temp:  [97.6 °F (36.4 °C)-98.2 °F (36.8 °C)] 98 °F (36.7 °C)  Pulse:  [] 76  Resp:  [17-20] 20  SpO2:  [94 %-100 %] 96 %  BP: ()/(52-76) 110/63     Weight: 64.5 kg (142 lb 3.2 oz)  Body mass index is 18.26 kg/m².    Intake/Output Summary (Last 24 hours) at 2/17/2024 1448  Last data filed at 2/17/2024 0800  Gross per 24 hour   Intake 2638.94 ml   Output --   Net 2638.94 ml         Physical Exam  Constitutional:       General: He is not in acute distress.     Appearance: Normal appearance. He is ill-appearing.   HENT:      Head: Normocephalic.      Right Ear: External ear normal.      Left Ear: External ear normal.      Nose: Nose normal.      Mouth/Throat:      Mouth: Mucous membranes are moist.   Eyes:      Conjunctiva/sclera: Conjunctivae normal.      Pupils: Pupils are equal, round, and reactive to light.   Cardiovascular:      Rate and Rhythm: Normal rate and regular rhythm.   Pulmonary:      Effort: Pulmonary effort is normal. No respiratory distress.      Breath sounds: Normal breath sounds. No wheezing.   Abdominal:      General: Bowel sounds are normal. There is distension.      Palpations: Abdomen is soft.      Tenderness: There is no abdominal tenderness.   Musculoskeletal:         General: No swelling. Normal range of motion.      Cervical back: Neck supple.   Skin:     General: Skin is warm.      Capillary Refill: Capillary refill takes less than 2 seconds.      Findings: No erythema.   Neurological:      General: No focal deficit present.      Mental Status: He is oriented to person, place, and time. Mental status is at baseline.   Psychiatric:         Mood and Affect: Mood normal.         Behavior:  Behavior normal.             Significant Labs: All pertinent labs within the past 24 hours have been reviewed.    Significant Imaging: I have reviewed all pertinent imaging results/findings within the past 24 hours.    Assessment/Plan:      * GI bleed  Positive stool occult blood  Hold long term oral anticoagulant  Trend H/H   NPO  Trend to keep Hgb> 7  Protonix IV  Consult GI    2/15  -evaluated by GI we will plan for endoscopy however patient has declined and son is aware  -we will reach out to GI tomorrow to see if it is safe to resume Eliquis    2/16  -hemoglobin dropped from 8.2-6.9  -consented for blood transfusion  -transfusing 1 unit of PRBC  -holding anticoagulation  -patient seen to have very poor insight into his medical condition, Psychiatry and palliative Care consulted.    2/17  -H&H stable        Dementia without behavioral disturbance  Patient with dementia with likely etiology of unknown dementia. Dementia is moderate. The patient does not have signs of behavioral disturbance. Home dementia medications are Held or Continued:  Not on medication . Continue non-pharmacologic interventions to prevent delirium (No VS between 11PM-5AM, activity during day, opening blinds, providing glasses/hearing aids, and up in chair during daytime). Will avoid narcotics and benzos unless absolutely necessary. PRN anti-psychotics are not prescribed to avoid self harm behaviors.    Patient without behavior disturbance  Psychiatry evaluated patient to determine if he has capacity given his poor insight into his medical condition    Plan  -patient lacks capacity  -patient's son Axel Burgos ([phone]) who have POA to make decisions  -frequent reorientation as needed  -avoid and or minimal the use of opioid, anticholinergic, benzodiazepines or other sedating medication    Chronic idiopathic constipation  Started on MiraLax daily  If no bowel movement tomorrow we will increase to b.i.d.    2/16  -having bowel  movement    Normocytic anemia  Patient's anemia is currently controlled. Has not received any PRBCs to date. Etiology likely d/t chronic blood loss  Current CBC reviewed-   Lab Results   Component Value Date    HGB 8.4 (L) 02/17/2024    HCT 26.3 (L) 02/17/2024     Monitor serial CBC and transfuse if patient becomes hemodynamically unstable, symptomatic or H/H drops below 7/21.    Symptomatic anemia  Patient's anemia is currently controlled. Has not received any PRBCs to date. Etiology likely d/t chronic blood loss  Current CBC reviewed-   Lab Results   Component Value Date    HGB 8.4 (L) 02/17/2024    HCT 26.3 (L) 02/17/2024     Monitor serial CBC and transfuse if patient becomes hemodynamically unstable, symptomatic or H/H drops below 7/21.    Acute blood loss anemia  Anemia of chronic disease  Symptomatic anemia  Hypotensive on admit    Patient's anemia is currently uncontrolled. Has received 1 units of PRBCs on 2/4 in the ED on arrival . Etiology likely d/t acute blood loss which was from GI bleed  Current CBC reviewed-   Lab Results   Component Value Date    HGB 8.4 (L) 02/17/2024    HCT 26.3 (L) 02/17/2024     Monitor serial CBC and transfuse if patient becomes hemodynamically unstable, symptomatic or H/H drops below 7/21.    Anticoagulant long-term use  This patient has long term use on an anticoagulant with Select Anticoagulant(s): Direct oral anticoagulant: Apixaban (Eliquis). Their long term anticoagulation will be Held or Continued: held. They are on long term anticoagulation due to Reason for Anticoagulation: Stroke prevention.   Hold due to GI bleed    2/16  Hemoglobin dropped to 6.9 from 8.2  Holding anticoagulation    2/17  -per chart review no note from Neurology or Cardiology regarding why patient is on anticoagulation  -there is a documented history of TIA/CVA, questioning if patient require full anticoagulation for CVA prevention especially in the setting of GI bleed  -neurology consulted    Mild  protein malnutrition  Nutrition consulted. Most recent weight and BMI monitored-     Measurements:  Wt Readings from Last 1 Encounters:   02/16/24 64.5 kg (142 lb 3.2 oz)   Body mass index is 18.26 kg/m².    Patient has been screened and assessed by RD.    Malnutrition Type:  Context:    Level:      Malnutrition Characteristic Summary:       Interventions/Recommendations (treatment strategy):         Anemia of chronic disease  Patient's anemia is currently controlled. Has not received any PRBCs to date. Etiology likely d/t chronic blood loss  Current CBC reviewed-   Lab Results   Component Value Date    HGB 8.4 (L) 02/17/2024    HCT 26.3 (L) 02/17/2024     Monitor serial CBC and transfuse if patient becomes hemodynamically unstable, symptomatic or H/H drops below 7/21.    Type 2 diabetes mellitus, controlled  Patient's FSGs are controlled on current medication regimen.  Last A1c reviewed-   Lab Results   Component Value Date    HGBA1C 5.0 02/14/2024     Most recent fingerstick glucose reviewed-   Recent Labs   Lab 02/16/24  1620 02/16/24  2054 02/17/24  0601 02/17/24  1127   POCTGLUCOSE 96 107 90 111*       Current correctional scale  Low  Maintain anti-hyperglycemic dose as follows-   Antihyperglycemics (From admission, onward)      Start     Stop Route Frequency Ordered    02/14/24 1507  insulin aspart U-100 pen 0-5 Units         -- SubQ Before meals & nightly PRN 02/14/24 1407          Hold Oral hypoglycemics while patient is in the hospital- not on home oral antiglycemia    Essential hypertension  Chronic, uncontrolled. Latest blood pressure and vitals reviewed-     Temp:  [97.6 °F (36.4 °C)-98.2 °F (36.8 °C)]   Pulse:  []   Resp:  [17-20]   BP: ()/(52-76)   SpO2:  [94 %-100 %] .   Home meds for hypertension were reviewed and noted below.   Hypertension Medications               amLODIPine (NORVASC) 2.5 MG tablet Take 2.5 mg by mouth once daily.    lisinopriL (PRINIVIL,ZESTRIL) 5 MG tablet Take 5 mg  by mouth once daily.            While in the hospital, will manage blood pressure as follows; Adjust home antihypertensive regimen as follows- hold due to hypotension on admit      Chronic venous insufficiency  chronic        VTE Risk Mitigation (From admission, onward)           Ordered     IP VTE HIGH RISK PATIENT  Once         02/14/24 1407     Place sequential compression device  Until discontinued         02/14/24 1407                    Discharge Planning   JESSI: 2/19/2024     Code Status: Full Code   Is the patient medically ready for discharge?:     Reason for patient still in hospital (select all that apply): Treatment and Consult recommendations  Discharge Plan A: Return to nursing home ( [facility])                  Renee Montanez MD  Department of Hospital Medicine   [facility]

## 2024-02-17 NOTE — ASSESSMENT & PLAN NOTE
Patient's anemia is currently controlled. Has not received any PRBCs to date. Etiology likely d/t chronic blood loss  Current CBC reviewed-   Lab Results   Component Value Date    HGB 8.4 (L) 02/17/2024    HCT 26.3 (L) 02/17/2024     Monitor serial CBC and transfuse if patient becomes hemodynamically unstable, symptomatic or H/H drops below 7/21.

## 2024-02-17 NOTE — ASSESSMENT & PLAN NOTE
Patient's FSGs are controlled on current medication regimen.  Last A1c reviewed-   Lab Results   Component Value Date    HGBA1C 5.0 02/14/2024     Most recent fingerstick glucose reviewed-   Recent Labs   Lab 02/16/24  1620 02/16/24  2054 02/17/24  0601 02/17/24  1127   POCTGLUCOSE 96 107 90 111*       Current correctional scale  Low  Maintain anti-hyperglycemic dose as follows-   Antihyperglycemics (From admission, onward)    Start     Stop Route Frequency Ordered    02/14/24 1507  insulin aspart U-100 pen 0-5 Units         -- SubQ Before meals & nightly PRN 02/14/24 1407        Hold Oral hypoglycemics while patient is in the hospital- not on home oral antiglycemia

## 2024-02-17 NOTE — PLAN OF CARE
Pt disoriented to time, place, and situation. Reorientation provided. IVFs infusing. Pt denies pain, discomfort, or nausea. Cardiac monitor maintained. Encouraged to call with questions/concerns/assistance. Safety.

## 2024-02-18 LAB
ANION GAP SERPL CALC-SCNC: 5 MMOL/L (ref 8–16)
BASOPHILS # BLD AUTO: 0.05 K/UL (ref 0–0.2)
BASOPHILS NFR BLD: 0.6 % (ref 0–1.9)
BUN SERPL-MCNC: 16 MG/DL (ref 8–23)
CALCIUM SERPL-MCNC: 8.4 MG/DL (ref 8.7–10.5)
CHLORIDE SERPL-SCNC: 106 MMOL/L (ref 95–110)
CO2 SERPL-SCNC: 25 MMOL/L (ref 23–29)
CREAT SERPL-MCNC: 1 MG/DL (ref 0.5–1.4)
DIFFERENTIAL METHOD BLD: ABNORMAL
EOSINOPHIL # BLD AUTO: 0.2 K/UL (ref 0–0.5)
EOSINOPHIL NFR BLD: 2.6 % (ref 0–8)
ERYTHROCYTE [DISTWIDTH] IN BLOOD BY AUTOMATED COUNT: 18.6 % (ref 11.5–14.5)
EST. GFR  (NO RACE VARIABLE): >60 ML/MIN/1.73 M^2
GLUCOSE SERPL-MCNC: 88 MG/DL (ref 70–110)
HCT VFR BLD AUTO: 29.1 % (ref 40–54)
HGB BLD-MCNC: 9.2 G/DL (ref 14–18)
IMM GRANULOCYTES # BLD AUTO: 0.07 K/UL (ref 0–0.04)
IMM GRANULOCYTES NFR BLD AUTO: 0.8 % (ref 0–0.5)
LYMPHOCYTES # BLD AUTO: 1.9 K/UL (ref 1–4.8)
LYMPHOCYTES NFR BLD: 21.8 % (ref 18–48)
MAGNESIUM SERPL-MCNC: 1.7 MG/DL (ref 1.6–2.6)
MCH RBC QN AUTO: 28.8 PG (ref 27–31)
MCHC RBC AUTO-ENTMCNC: 31.6 G/DL (ref 32–36)
MCV RBC AUTO: 91 FL (ref 82–98)
MONOCYTES # BLD AUTO: 0.8 K/UL (ref 0.3–1)
MONOCYTES NFR BLD: 9.1 % (ref 4–15)
NEUTROPHILS # BLD AUTO: 5.5 K/UL (ref 1.8–7.7)
NEUTROPHILS NFR BLD: 65.1 % (ref 38–73)
NRBC BLD-RTO: 0 /100 WBC
PLATELET # BLD AUTO: 242 K/UL (ref 150–450)
PMV BLD AUTO: 10.1 FL (ref 9.2–12.9)
POCT GLUCOSE: 103 MG/DL (ref 70–110)
POCT GLUCOSE: 109 MG/DL (ref 70–110)
POCT GLUCOSE: 126 MG/DL (ref 70–110)
POCT GLUCOSE: 95 MG/DL (ref 70–110)
POTASSIUM SERPL-SCNC: 4.1 MMOL/L (ref 3.5–5.1)
RBC # BLD AUTO: 3.19 M/UL (ref 4.6–6.2)
SODIUM SERPL-SCNC: 136 MMOL/L (ref 136–145)
WBC # BLD AUTO: 8.49 K/UL (ref 3.9–12.7)

## 2024-02-18 PROCEDURE — C9113 INJ PANTOPRAZOLE SODIUM, VIA: HCPCS | Mod: HCNC | Performed by: FAMILY MEDICINE

## 2024-02-18 PROCEDURE — 25000003 PHARM REV CODE 250: Mod: HCNC | Performed by: STUDENT IN AN ORGANIZED HEALTH CARE EDUCATION/TRAINING PROGRAM

## 2024-02-18 PROCEDURE — 80048 BASIC METABOLIC PNL TOTAL CA: CPT | Mod: HCNC | Performed by: STUDENT IN AN ORGANIZED HEALTH CARE EDUCATION/TRAINING PROGRAM

## 2024-02-18 PROCEDURE — 83735 ASSAY OF MAGNESIUM: CPT | Mod: HCNC | Performed by: STUDENT IN AN ORGANIZED HEALTH CARE EDUCATION/TRAINING PROGRAM

## 2024-02-18 PROCEDURE — 85025 COMPLETE CBC W/AUTO DIFF WBC: CPT | Mod: HCNC | Performed by: STUDENT IN AN ORGANIZED HEALTH CARE EDUCATION/TRAINING PROGRAM

## 2024-02-18 PROCEDURE — 21400001 HC TELEMETRY ROOM: Mod: HCNC

## 2024-02-18 PROCEDURE — 36415 COLL VENOUS BLD VENIPUNCTURE: CPT | Mod: HCNC | Performed by: STUDENT IN AN ORGANIZED HEALTH CARE EDUCATION/TRAINING PROGRAM

## 2024-02-18 PROCEDURE — 63600175 PHARM REV CODE 636 W HCPCS: Mod: HCNC | Performed by: FAMILY MEDICINE

## 2024-02-18 RX ORDER — ASPIRIN 81 MG/1
81 TABLET ORAL DAILY
Status: DISCONTINUED | OUTPATIENT
Start: 2024-02-18 | End: 2024-02-20 | Stop reason: HOSPADM

## 2024-02-18 RX ADMIN — ATORVASTATIN CALCIUM 20 MG: 20 TABLET, FILM COATED ORAL at 08:02

## 2024-02-18 RX ADMIN — CHOLECALCIFEROL TAB 25 MCG (1000 UNIT) 1000 UNITS: 25 TAB at 08:02

## 2024-02-18 RX ADMIN — POLYETHYLENE GLYCOL 3350 17 G: 17 POWDER, FOR SOLUTION ORAL at 08:02

## 2024-02-18 RX ADMIN — PANTOPRAZOLE SODIUM 40 MG: 40 INJECTION, POWDER, LYOPHILIZED, FOR SOLUTION INTRAVENOUS at 08:02

## 2024-02-18 RX ADMIN — OXYCODONE HYDROCHLORIDE AND ACETAMINOPHEN 500 MG: 500 TABLET ORAL at 08:02

## 2024-02-18 RX ADMIN — ASPIRIN 81 MG: 81 TABLET, COATED ORAL at 01:02

## 2024-02-18 RX ADMIN — FERROUS SULFATE TAB 325 MG (65 MG ELEMENTAL FE) 1 EACH: 325 (65 FE) TAB at 08:02

## 2024-02-18 RX ADMIN — SODIUM CHLORIDE, POTASSIUM CHLORIDE, SODIUM LACTATE AND CALCIUM CHLORIDE: 600; 310; 30; 20 INJECTION, SOLUTION INTRAVENOUS at 01:02

## 2024-02-18 NOTE — ASSESSMENT & PLAN NOTE
Patient's FSGs are controlled on current medication regimen.  Last A1c reviewed-   Lab Results   Component Value Date    HGBA1C 5.0 02/14/2024     Most recent fingerstick glucose reviewed-   Recent Labs   Lab 02/17/24  1548 02/17/24  2047 02/18/24  0546 02/18/24  1110   POCTGLUCOSE 115* 108 95 103       Current correctional scale  Low  Maintain anti-hyperglycemic dose as follows-   Antihyperglycemics (From admission, onward)    Start     Stop Route Frequency Ordered    02/14/24 1507  insulin aspart U-100 pen 0-5 Units         -- SubQ Before meals & nightly PRN 02/14/24 1407        Hold Oral hypoglycemics while patient is in the hospital- not on home oral antiglycemia

## 2024-02-18 NOTE — ASSESSMENT & PLAN NOTE
Patient's anemia is currently controlled. Has not received any PRBCs to date. Etiology likely d/t chronic blood loss  Current CBC reviewed-   Lab Results   Component Value Date    HGB 9.2 (L) 02/18/2024    HCT 29.1 (L) 02/18/2024     Monitor serial CBC and transfuse if patient becomes hemodynamically unstable, symptomatic or H/H drops below 7/21.

## 2024-02-18 NOTE — ASSESSMENT & PLAN NOTE
Patient with dementia with likely etiology of unknown dementia. Dementia is moderate. The patient does not have signs of behavioral disturbance. Home dementia medications are Held or Continued:  Not on medication . Continue non-pharmacologic interventions to prevent delirium (No VS between 11PM-5AM, activity during day, opening blinds, providing glasses/hearing aids, and up in chair during daytime). Will avoid narcotics and benzos unless absolutely necessary. PRN anti-psychotics are not prescribed to avoid self harm behaviors.    Patient without behavior disturbance  Psychiatry evaluated patient to determine if he has capacity given his poor insight into his medical condition    Plan  -patient lacks capacity  -patient's son Axel Burgos ([phone]) who have POA to make decisions  -frequent reorientation as needed  -avoid and or minimal the use of opioid, anticholinergic, benzodiazepines or other sedating medication

## 2024-02-18 NOTE — ASSESSMENT & PLAN NOTE
Anemia of chronic disease  Symptomatic anemia  Hypotensive on admit    Patient's anemia is currently uncontrolled. Has received 1 units of PRBCs on 2/4 in the ED on arrival . Etiology likely d/t acute blood loss which was from GI bleed  Current CBC reviewed-   Lab Results   Component Value Date    HGB 9.2 (L) 02/18/2024    HCT 29.1 (L) 02/18/2024     Monitor serial CBC and transfuse if patient becomes hemodynamically unstable, symptomatic or H/H drops below 7/21.

## 2024-02-18 NOTE — ASSESSMENT & PLAN NOTE
This patient has long term use on an anticoagulant with [employer] Anticoagulant(s): Direct oral anticoagulant: Apixaban (Eliquis). Their long term anticoagulation will be Held or Continued: held. They are on long term anticoagulation due to Reason for Anticoagulation: Stroke prevention.   Hold due to GI bleed    2/16  Hemoglobin dropped to 6.9 from 8.2  Holding anticoagulation    2/17-2/18  -per chart review no note from Neurology or Cardiology regarding why patient is on anticoagulation  -there is a documented history of TIA/CVA, questioning if patient require full anticoagulation for CVA prevention especially in the setting of GI bleed  -neurology consulted

## 2024-02-18 NOTE — SUBJECTIVE & OBJECTIVE
Interval History:  Patient is seen and evaluated at bedside this morning.  No acute events overnight.  Denies any acute complaints this morning.  Hemoglobin stable.    Review of Systems   Constitutional:  Positive for fatigue. Negative for chills and fever.   HENT:  Negative for congestion and nosebleeds.    Eyes:  Negative for pain and discharge.   Respiratory:  Negative for chest tightness, shortness of breath and wheezing.    Cardiovascular:  Negative for chest pain, palpitations and leg swelling.   Gastrointestinal:  Positive for abdominal distention. Negative for abdominal pain, constipation, diarrhea, nausea and vomiting.   Genitourinary:  Negative for dysuria, flank pain and hematuria.   Musculoskeletal:  Negative for joint swelling.   Neurological:  Negative for seizures and speech difficulty.   Psychiatric/Behavioral:  Negative for agitation and confusion.      Objective:     Vital Signs (Most Recent):  Temp: 97 °F (36.1 °C) (02/18/24 1050)  Pulse: 68 (02/18/24 1050)  Resp: 18 (02/18/24 1050)  BP: (!) 89/54 (02/18/24 1050)  SpO2: 98 % (02/18/24 1050) Vital Signs (24h Range):  Temp:  [97 °F (36.1 °C)-98 °F (36.7 °C)] 97 °F (36.1 °C)  Pulse:  [] 68  Resp:  [16-20] 18  SpO2:  [93 %-98 %] 98 %  BP: ()/(54-79) 89/54     Weight: 68.7 kg (151 lb 7.3 oz)  Body mass index is 19.45 kg/m².    Intake/Output Summary (Last 24 hours) at 2/18/2024 1238  Last data filed at 2/17/2024 1800  Gross per 24 hour   Intake 1365.61 ml   Output 0 ml   Net 1365.61 ml         Physical Exam  Constitutional:       General: He is not in acute distress.     Appearance: Normal appearance.   HENT:      Head: Normocephalic.      Right Ear: External ear normal.      Left Ear: External ear normal.      Nose: Nose normal.      Mouth/Throat:      Mouth: Mucous membranes are moist.   Eyes:      Conjunctiva/sclera: Conjunctivae normal.      Pupils: Pupils are equal, round, and reactive to light.   Cardiovascular:      Rate and Rhythm:  Normal rate and regular rhythm.   Pulmonary:      Effort: Pulmonary effort is normal. No respiratory distress.      Breath sounds: Normal breath sounds. No wheezing.   Abdominal:      General: Bowel sounds are normal. There is distension.      Palpations: Abdomen is soft.      Tenderness: There is no abdominal tenderness.   Musculoskeletal:         General: No swelling. Normal range of motion.      Cervical back: Neck supple.   Skin:     General: Skin is warm.      Capillary Refill: Capillary refill takes less than 2 seconds.      Findings: No erythema.   Neurological:      General: No focal deficit present.      Mental Status: He is oriented to person, place, and time. Mental status is at baseline.   Psychiatric:         Mood and Affect: Mood normal.         Behavior: Behavior normal.             Significant Labs: All pertinent labs within the past 24 hours have been reviewed.    Significant Imaging: I have reviewed all pertinent imaging results/findings within the past 24 hours.

## 2024-02-18 NOTE — CONSULTS
NEUROLOGY FLOOR CONSULT    Reason for consult: If patient could discontinue full dose anticoagulant for stroke prevention    CC: AMS    HPI:     Per chart this is an 78 year old male with pmh of DM2, CVA x2 on apixaban and ASA, HTN, and varicose veins presents to [facility] ED via EMS from [facility] for symptomatic anemia (hgb 6.0) and positive occult blood during routine testing. Patient admitted due to AMS, found to be anemic with H/H of 7.2, K of 5.4. CTA ABD reveals fecal impaction. He is s/p one unit PRBCs and IV Protonix. Neurology was consulted given patient history of eliquix intake. Primary team wants to know if patient AC could be discontinued. In 2022 patient was admitted for CVA. At that time CTH showed Small remote lacunar infarcts involving the left basal ganglia and right corona radiata. No CTA on chart as well as MRI brain. Patient was likely started on Eliquis and aspirin. Today patient is being treated for anemia due to blood loss but no active bleeding from the rectum. On exam patient is alert and oriented to name but not to place, year or situation. He denies any headache, weakness, numbness, visual deficit.     ROS: As per HPI    Histories:     Allergies:  Patient has no known allergies.    Current Medications:    Current Facility-Administered Medications   Medication Dose Route Frequency Provider Last Rate Last Admin    0.9%  NaCl infusion (for blood administration)   Intravenous Q24H PRN Renee Montanez MD        ascorbic acid (vitamin C) tablet 500 mg  500 mg Oral Daily Renee Montanez MD   500 mg at 02/18/24 0801    atorvastatin tablet 20 mg  20 mg Oral Daily Renee Montanez MD   20 mg at 02/18/24 0801    dextrose 10% bolus 125 mL 125 mL  12.5 g Intravenous PRN Eulalia Cornejo MD        dextrose 10% bolus 250 mL 250 mL  25 g Intravenous PRN Eulalia Cornejo MD        ferrous sulfate tablet 1 each  1 tablet Oral Daily Renee Montanez MD   1 each at 02/18/24 0801     glucagon (human recombinant) injection 1 mg  1 mg Intramuscular PRN Eulalia Cornejo MD        glucose chewable tablet 16 g  16 g Oral PRN Eulalia Cornejo MD        glucose chewable tablet 24 g  24 g Oral PRN Eulalia Cornejo MD        insulin aspart U-100 pen 0-5 Units  0-5 Units Subcutaneous QID (AC + HS) PRN Eulalia Cornejo MD        lactated ringers infusion   Intravenous Continuous Eulalia Cornejo  mL/hr at 02/17/24 1800 Rate Verify at 02/17/24 1800    pantoprazole injection 40 mg  40 mg Intravenous BID Eulalia Cornejo MD   40 mg at 02/18/24 0801    polyethylene glycol packet 17 g  17 g Oral Daily Renee Montanez MD   17 g at 02/18/24 0801    vitamin D 1000 units tablet 1,000 Units  1,000 Units Oral Daily Renee Montanez MD   1,000 Units at 02/18/24 0802       Past Medical/Surgical/Family History:  Medical:   Past Medical History:   Diagnosis Date    Anticoagulant long-term use     Diabetes mellitus     Hypertension     Stroke     Varicose veins       Surgeries:   Past Surgical History:   Procedure Laterality Date    VARICOSE VEIN SURGERY        Family: No family history on file.,    Social History:    Current Evaluation:     Vital Signs:   Vitals:    02/18/24 0716   BP: 127/68   Pulse: 82   Resp: 18   Temp: 98 °F (36.7 °C)        Neurological Examination   Orientation  Alert, awake, oriented to self, but not to place, time, and situation.  Memory  Recent and remote memory intact.  Language  No dysarthria, No aphasia.   Cranial Nerves  PERRL, VF intact, EOMI, V1-V3 intact, symmetric facial expression, hearing grossly intact, SCM & TPZ 5/5, tongue midline, symmetric palate elevation.  Motor  Normal Bulk  Normal Tone  5/5 strength in 4 extremities  Sensory  Normal to light touch throughout  DTR   +2 symmetric  Cerebellar/Gait  Normal finger to nose and heel to shin.    Assessment:  Plan:     78 year old male with pmh of DM2, CVA x2 on  apixaban and ASA, HTN, and varicose veins presents to [facility] ED via EMS from [facility] for symptomatic anemia (hgb 6.0) and positive occult blood during routine testing. Patient admitted due to AMS, found to be anemic with H/H of 7.2, K of 5.4. CTA ABD reveals fecal impaction. Primary team consulted regarding patient on eliquis for stroke prevention. Patient is being treated for blood loss and he is s/p 1unit PRBC transfusion. He had a history of CVA in the past. Location of his infarct include left basal ganglia and right corona radiata. No reported history of A.fib. Patient has been on eliquis and aspirin. Given history and exam findings as well as image result on cat scan, his infarct is likely lacunar from HTN more so that embolic source. If patient has no other reason to be on Eliquis medically, medication can be discontinued and can continue aspirin whenever patient is stable considering benefit and risk of being on AC.      Plan  -Team wondering if patient needs to be on AC for stroke prevention  -After reviewing old CTH, its likely his infarct is lacunar 2/2 from HTN   -Patient can continue aspirin when stable  -Can discontinue eliquis if started for stroke prevention with no embolic history  -Rest of care per primary team.    Case discussed with Dr. Huerta    Will sign off at this time.     Bar Rapp MD  [employer] [facility] PGY-4  [facility] [facility] Consult [facility]

## 2024-02-18 NOTE — PLAN OF CARE
Pt disoriented to time, place, and situation. Reorientation provided. IVFs infusing. Pt denies pain, discomfort, or nausea. Encouraged to call with questions/concerns/assistance. Safety.

## 2024-02-18 NOTE — ASSESSMENT & PLAN NOTE
This patient has long term use on an anticoagulant with [facility] Anticoagulant(s): Direct oral anticoagulant: Apixaban (Eliquis). Their long term anticoagulation will be Held or Continued: held. They are on long term anticoagulation due to Reason for Anticoagulation: Stroke prevention.   Hold due to GI bleed    2/16  Hemoglobin dropped to 6.9 from 8.2  Holding anticoagulation    2/17-2/18  -per chart review no note from Neurology or Cardiology regarding why patient is on anticoagulation  -there is a documented history of TIA/CVA, questioning if patient require full anticoagulation for CVA prevention especially in the setting of GI bleed  -neurology consulted, recs starting ASA, to DC Eliquis if started for stroke prevention  -started on aspirin

## 2024-02-18 NOTE — PT/OT/SLP DISCHARGE
Physical Therapy Discharge Summary    Name: Axel Burgos  MRN: [id number]   Principal Problem: GI bleed     Patient Discharged from acute Physical Therapy on 2/18/2024.  Please refer to prior PT noted date on 2/15/2024 for functional status.     Assessment:     Patient remains at baseline and no changes are present from evaluation performed on 2/15 by PT and OT.     Objective:     GOALS:   Multidisciplinary Problems       Physical Therapy Goals       Not on file              Multidisciplinary Problems (Resolved)          Problem: Physical Therapy    Goal Priority Disciplines Outcome Goal Variances Interventions   Physical Therapy Goal   (Resolved)     PT, PT/OT Met                         Reasons for Discontinuation of Therapy Services  Therapist determines that the patient will no longer benefit from therapy services.      Plan:     Patient to be Discharged back  to:  [facility] .      2/18/2024

## 2024-02-18 NOTE — ASSESSMENT & PLAN NOTE
Positive stool occult blood  Hold long term oral anticoagulant  Trend H/H   NPO  Trend to keep Hgb> 7  Protonix IV  Consult GI    2/15  -evaluated by GI we will plan for endoscopy however patient has declined and son is aware  -we will reach out to GI tomorrow to see if it is safe to resume Eliquis    2/16  -hemoglobin dropped from 8.2-6.9  -consented for blood transfusion  -transfusing 1 unit of PRBC  -holding anticoagulation  -patient seen to have very poor insight into his medical condition, Psychiatry and palliative Care consulted.    2/17-2/18  -H&H stable

## 2024-02-18 NOTE — PT/OT/SLP PROGRESS
Occupational Therapy      Patient Name:  Axel Burgos   MRN:  [id number]    Pt was evaluated by OT on 2/15/2024 and found to be at baseline functioning. Please refer to OT evaluation for clinical findings. Will discontinue current OT evaluation and treatment orders.    2/18/2024

## 2024-02-18 NOTE — ASSESSMENT & PLAN NOTE
Chronic, uncontrolled. Latest blood pressure and vitals reviewed-     Temp:  [97 °F (36.1 °C)-98 °F (36.7 °C)]   Pulse:  []   Resp:  [16-20]   BP: ()/(54-79)   SpO2:  [93 %-98 %] .   Home meds for hypertension were reviewed and noted below.   Hypertension Medications               amLODIPine (NORVASC) 2.5 MG tablet Take 2.5 mg by mouth once daily.    lisinopriL (PRINIVIL,ZESTRIL) 5 MG tablet Take 5 mg by mouth once daily.            While in the hospital, will manage blood pressure as follows; Adjust home antihypertensive regimen as follows- hold due to hypotension on admit    2/18  -blood pressure still borderline, continue to hold antihypertensive medication

## 2024-02-18 NOTE — PROGRESS NOTES
[facility] Medicine  Progress Note    Patient Name: Axel Burgos  MRN: [id number]  Patient Class: IP- Inpatient   Admission Date: 2/14/2024  Length of Stay: 2 days  Attending Physician: Renee Montanez MD  Primary Care Provider: Home, [facility]        Subjective:     Principal Problem:GI bleed        HPI:  Axel Burgos is a 78 y.o. male with  PMHx of DM2, CVA x2 on apixaban and ASA, HTN, and varicose veins presents to [facility] ED via EMS from [facility] for symptomatic anemia (hgb 6.0) and positive occult blood during routine testing. Pt denies ABD pain, N/V/D/C, CP, SOB, dizziness. States he has been eating, drinking, and taking PO meds w/o issue. ED workup significant for Hgb 7.2, K 5.4, BUN/Cr 25/1.1, CTA ABD shows fecal impaction and non-obstructing nephrolithiasis. Given one unit PRBCs, and IV protonix in ED. BP stable in low 100s. No active bleeding from rectum. Admit hospital medicine and consult GI for evaluation    Overview/Hospital Course:  No notes on file    Interval History:  Patient is seen and evaluated at bedside this morning.  No acute events overnight.  Denies any acute complaints this morning.  Hemoglobin stable.    Review of Systems   Constitutional:  Positive for fatigue. Negative for chills and fever.   HENT:  Negative for congestion and nosebleeds.    Eyes:  Negative for pain and discharge.   Respiratory:  Negative for chest tightness, shortness of breath and wheezing.    Cardiovascular:  Negative for chest pain, palpitations and leg swelling.   Gastrointestinal:  Positive for abdominal distention. Negative for abdominal pain, constipation, diarrhea, nausea and vomiting.   Genitourinary:  Negative for dysuria, flank pain and hematuria.   Musculoskeletal:  Negative for joint swelling.   Neurological:  Negative for seizures and speech difficulty.   Psychiatric/Behavioral:  Negative for agitation and confusion.      Objective:     Vital Signs (Most  Recent):  Temp: 97 °F (36.1 °C) (02/18/24 1050)  Pulse: 68 (02/18/24 1050)  Resp: 18 (02/18/24 1050)  BP: (!) 89/54 (02/18/24 1050)  SpO2: 98 % (02/18/24 1050) Vital Signs (24h Range):  Temp:  [97 °F (36.1 °C)-98 °F (36.7 °C)] 97 °F (36.1 °C)  Pulse:  [] 68  Resp:  [16-20] 18  SpO2:  [93 %-98 %] 98 %  BP: ()/(54-79) 89/54     Weight: 68.7 kg (151 lb 7.3 oz)  Body mass index is 19.45 kg/m².    Intake/Output Summary (Last 24 hours) at 2/18/2024 1238  Last data filed at 2/17/2024 1800  Gross per 24 hour   Intake 1365.61 ml   Output 0 ml   Net 1365.61 ml         Physical Exam  Constitutional:       General: He is not in acute distress.     Appearance: Normal appearance.   HENT:      Head: Normocephalic.      Right Ear: External ear normal.      Left Ear: External ear normal.      Nose: Nose normal.      Mouth/Throat:      Mouth: Mucous membranes are moist.   Eyes:      Conjunctiva/sclera: Conjunctivae normal.      Pupils: Pupils are equal, round, and reactive to light.   Cardiovascular:      Rate and Rhythm: Normal rate and regular rhythm.   Pulmonary:      Effort: Pulmonary effort is normal. No respiratory distress.      Breath sounds: Normal breath sounds. No wheezing.   Abdominal:      General: Bowel sounds are normal. There is distension.      Palpations: Abdomen is soft.      Tenderness: There is no abdominal tenderness.   Musculoskeletal:         General: No swelling. Normal range of motion.      Cervical back: Neck supple.   Skin:     General: Skin is warm.      Capillary Refill: Capillary refill takes less than 2 seconds.      Findings: No erythema.   Neurological:      General: No focal deficit present.      Mental Status: He is oriented to person, place, and time. Mental status is at baseline.   Psychiatric:         Mood and Affect: Mood normal.         Behavior: Behavior normal.             Significant Labs: All pertinent labs within the past 24 hours have been reviewed.    Significant Imaging: I  have reviewed all pertinent imaging results/findings within the past 24 hours.    Assessment/Plan:      * GI bleed  Positive stool occult blood  Hold long term oral anticoagulant  Trend H/H   NPO  Trend to keep Hgb> 7  Protonix IV  Consult GI    2/15  -evaluated by GI we will plan for endoscopy however patient has declined and son is aware  -we will reach out to GI tomorrow to see if it is safe to resume Eliquis    2/16  -hemoglobin dropped from 8.2-6.9  -consented for blood transfusion  -transfusing 1 unit of PRBC  -holding anticoagulation  -patient seen to have very poor insight into his medical condition, Psychiatry and palliative Care consulted.    2/17-2/18  -H&H stable  -follow up on H pylori test         Dementia without behavioral disturbance  Patient with dementia with likely etiology of unknown dementia. Dementia is moderate. The patient does not have signs of behavioral disturbance. Home dementia medications are Held or Continued:  Not on medication . Continue non-pharmacologic interventions to prevent delirium (No VS between 11PM-5AM, activity during day, opening blinds, providing glasses/hearing aids, and up in chair during daytime). Will avoid narcotics and benzos unless absolutely necessary. PRN anti-psychotics are not prescribed to avoid self harm behaviors.    Patient without behavior disturbance  Psychiatry evaluated patient to determine if he has capacity given his poor insight into his medical condition    Plan  -patient lacks capacity  -patient's son Axel Burgos [phone]) who have POA to make decisions  -frequent reorientation as needed  -avoid and or minimal the use of opioid, anticholinergic, benzodiazepines or other sedating medication    Chronic idiopathic constipation  Started on MiraLax daily  If no bowel movement tomorrow we will increase to b.i.d.    2/16  -having bowel movement    Normocytic anemia  Patient's anemia is currently controlled. Has not received any PRBCs to date.  Etiology likely d/t chronic blood loss  Current CBC reviewed-   Lab Results   Component Value Date    HGB 9.2 (L) 02/18/2024    HCT 29.1 (L) 02/18/2024     Monitor serial CBC and transfuse if patient becomes hemodynamically unstable, symptomatic or H/H drops below 7/21.    Symptomatic anemia  Patient's anemia is currently controlled. Has not received any PRBCs to date. Etiology likely d/t chronic blood loss  Current CBC reviewed-   Lab Results   Component Value Date    HGB 9.2 (L) 02/18/2024    HCT 29.1 (L) 02/18/2024     Monitor serial CBC and transfuse if patient becomes hemodynamically unstable, symptomatic or H/H drops below 7/21.    Acute blood loss anemia  Anemia of chronic disease  Symptomatic anemia  Hypotensive on admit    Patient's anemia is currently uncontrolled. Has received 1 units of PRBCs on 2/4 in the ED on arrival . Etiology likely d/t acute blood loss which was from GI bleed  Current CBC reviewed-   Lab Results   Component Value Date    HGB 9.2 (L) 02/18/2024    HCT 29.1 (L) 02/18/2024     Monitor serial CBC and transfuse if patient becomes hemodynamically unstable, symptomatic or H/H drops below 7/21.    Anticoagulant long-term use  This patient has long term use on an anticoagulant with [facility] Anticoagulant(s): Direct oral anticoagulant: Apixaban (Eliquis). Their long term anticoagulation will be Held or Continued: held. They are on long term anticoagulation due to Reason for Anticoagulation: Stroke prevention.   Hold due to GI bleed    2/16  Hemoglobin dropped to 6.9 from 8.2  Holding anticoagulation    2/17-2/18  -per chart review no note from Neurology or Cardiology regarding why patient is on anticoagulation  -there is a documented history of TIA/CVA, questioning if patient require full anticoagulation for CVA prevention especially in the setting of GI bleed  -neurology consulted, recs starting ASA, to DC Eliquis if started for stroke prevention  -started on aspirin    Mild protein  malnutrition  Nutrition consulted. Most recent weight and BMI monitored-     Measurements:  Wt Readings from Last 1 Encounters:   02/17/24 68.7 kg (151 lb 7.3 oz)   Body mass index is 19.45 kg/m².    Patient has been screened and assessed by RD.    Malnutrition Type:  Context:    Level:      Malnutrition Characteristic Summary:       Interventions/Recommendations (treatment strategy):         Anemia of chronic disease  Patient's anemia is currently controlled. Has not received any PRBCs to date. Etiology likely d/t chronic blood loss  Current CBC reviewed-   Lab Results   Component Value Date    HGB 9.2 (L) 02/18/2024    HCT 29.1 (L) 02/18/2024     Monitor serial CBC and transfuse if patient becomes hemodynamically unstable, symptomatic or H/H drops below 7/21.    Type 2 diabetes mellitus, controlled  Patient's FSGs are controlled on current medication regimen.  Last A1c reviewed-   Lab Results   Component Value Date    HGBA1C 5.0 02/14/2024     Most recent fingerstick glucose reviewed-   Recent Labs   Lab 02/17/24  1548 02/17/24  2047 02/18/24  0546 02/18/24  1110   POCTGLUCOSE 115* 108 95 103       Current correctional scale  Low  Maintain anti-hyperglycemic dose as follows-   Antihyperglycemics (From admission, onward)      Start     Stop Route Frequency Ordered    02/14/24 1507  insulin aspart U-100 pen 0-5 Units         -- SubQ Before meals & nightly PRN 02/14/24 1407          Hold Oral hypoglycemics while patient is in the hospital- not on home oral antiglycemia    Essential hypertension  Chronic, uncontrolled. Latest blood pressure and vitals reviewed-     Temp:  [97 °F (36.1 °C)-98 °F (36.7 °C)]   Pulse:  []   Resp:  [16-20]   BP: ()/(54-79)   SpO2:  [93 %-98 %] .   Home meds for hypertension were reviewed and noted below.   Hypertension Medications               amLODIPine (NORVASC) 2.5 MG tablet Take 2.5 mg by mouth once daily.    lisinopriL (PRINIVIL,ZESTRIL) 5 MG tablet Take 5 mg by mouth  once daily.            While in the hospital, will manage blood pressure as follows; Adjust home antihypertensive regimen as follows- hold due to hypotension on admit    2/18  -blood pressure still borderline, continue to hold antihypertensive medication    Chronic venous insufficiency  chronic        VTE Risk Mitigation (From admission, onward)           Ordered     IP VTE HIGH RISK PATIENT  Once         02/14/24 1407     Place sequential compression device  Until discontinued         02/14/24 1407                    Discharge Planning   JESSI: 2/19/2024     Code Status: Full Code   Is the patient medically ready for discharge?:     Reason for patient still in hospital (select all that apply): Treatment and Consult recommendations  Discharge Plan A: Return to nursing home ([facility])                  Renee Montanez MD  Department of Hospital Medicine   [facility]

## 2024-02-18 NOTE — ASSESSMENT & PLAN NOTE
Nutrition consulted. Most recent weight and BMI monitored-     Measurements:  Wt Readings from Last 1 Encounters:   02/17/24 68.7 kg (151 lb 7.3 oz)   Body mass index is 19.45 kg/m².    Patient has been screened and assessed by RD.    Malnutrition Type:  Context:    Level:      Malnutrition Characteristic Summary:       Interventions/Recommendations (treatment strategy):

## 2024-02-18 NOTE — ASSESSMENT & PLAN NOTE
Positive stool occult blood  Hold long term oral anticoagulant  Trend H/H   NPO  Trend to keep Hgb> 7  Protonix IV  Consult GI    2/15  -evaluated by GI we will plan for endoscopy however patient has declined and son is aware  -we will reach out to GI tomorrow to see if it is safe to resume Eliquis    2/16  -hemoglobin dropped from 8.2-6.9  -consented for blood transfusion  -transfusing 1 unit of PRBC  -holding anticoagulation  -patient seen to have very poor insight into his medical condition, Psychiatry and palliative Care consulted.    2/17-2/18  -H&H stable  -follow up on H pylori test

## 2024-02-19 LAB
ANION GAP SERPL CALC-SCNC: 9 MMOL/L (ref 8–16)
ANISOCYTOSIS BLD QL SMEAR: SLIGHT
BASOPHILS NFR BLD: 0 % (ref 0–1.9)
BUN SERPL-MCNC: 17 MG/DL (ref 8–23)
BURR CELLS BLD QL SMEAR: ABNORMAL
CALCIUM SERPL-MCNC: 8 MG/DL (ref 8.7–10.5)
CHLORIDE SERPL-SCNC: 105 MMOL/L (ref 95–110)
CO2 SERPL-SCNC: 22 MMOL/L (ref 23–29)
CREAT SERPL-MCNC: 1.2 MG/DL (ref 0.5–1.4)
DIFFERENTIAL METHOD BLD: ABNORMAL
EOSINOPHIL NFR BLD: 0 % (ref 0–8)
ERYTHROCYTE [DISTWIDTH] IN BLOOD BY AUTOMATED COUNT: 18.7 % (ref 11.5–14.5)
EST. GFR  (NO RACE VARIABLE): >60 ML/MIN/1.73 M^2
GLUCOSE SERPL-MCNC: 102 MG/DL (ref 70–110)
HCT VFR BLD AUTO: 27.1 % (ref 40–54)
HGB BLD-MCNC: 8.5 G/DL (ref 14–18)
HYPOCHROMIA BLD QL SMEAR: ABNORMAL
IMM GRANULOCYTES # BLD AUTO: ABNORMAL K/UL (ref 0–0.04)
IMM GRANULOCYTES NFR BLD AUTO: ABNORMAL % (ref 0–0.5)
LYMPHOCYTES NFR BLD: 7 % (ref 18–48)
MAGNESIUM SERPL-MCNC: 1.8 MG/DL (ref 1.6–2.6)
MCH RBC QN AUTO: 28.8 PG (ref 27–31)
MCHC RBC AUTO-ENTMCNC: 31.4 G/DL (ref 32–36)
MCV RBC AUTO: 92 FL (ref 82–98)
MONOCYTES NFR BLD: 3 % (ref 4–15)
NEUTROPHILS NFR BLD: 90 % (ref 38–73)
NRBC BLD-RTO: 0 /100 WBC
OVALOCYTES BLD QL SMEAR: ABNORMAL
PLATELET # BLD AUTO: 246 K/UL (ref 150–450)
PLATELET BLD QL SMEAR: ABNORMAL
PMV BLD AUTO: 10.4 FL (ref 9.2–12.9)
POCT GLUCOSE: 101 MG/DL (ref 70–110)
POCT GLUCOSE: 118 MG/DL (ref 70–110)
POCT GLUCOSE: 122 MG/DL (ref 70–110)
POCT GLUCOSE: 99 MG/DL (ref 70–110)
POIKILOCYTOSIS BLD QL SMEAR: SLIGHT
POTASSIUM SERPL-SCNC: 4.5 MMOL/L (ref 3.5–5.1)
RBC # BLD AUTO: 2.95 M/UL (ref 4.6–6.2)
SARS-COV-2 RDRP RESP QL NAA+PROBE: NEGATIVE
SCHISTOCYTES BLD QL SMEAR: PRESENT
SODIUM SERPL-SCNC: 136 MMOL/L (ref 136–145)
TARGETS BLD QL SMEAR: ABNORMAL
WBC # BLD AUTO: 11.02 K/UL (ref 3.9–12.7)

## 2024-02-19 PROCEDURE — 85027 COMPLETE CBC AUTOMATED: CPT | Mod: HCNC | Performed by: STUDENT IN AN ORGANIZED HEALTH CARE EDUCATION/TRAINING PROGRAM

## 2024-02-19 PROCEDURE — 51798 US URINE CAPACITY MEASURE: CPT | Mod: HCNC

## 2024-02-19 PROCEDURE — U0002 COVID-19 LAB TEST NON-CDC: HCPCS | Mod: HCNC | Performed by: FAMILY MEDICINE

## 2024-02-19 PROCEDURE — 85007 BL SMEAR W/DIFF WBC COUNT: CPT | Mod: HCNC | Performed by: STUDENT IN AN ORGANIZED HEALTH CARE EDUCATION/TRAINING PROGRAM

## 2024-02-19 PROCEDURE — 63600175 PHARM REV CODE 636 W HCPCS: Mod: HCNC | Performed by: FAMILY MEDICINE

## 2024-02-19 PROCEDURE — C9113 INJ PANTOPRAZOLE SODIUM, VIA: HCPCS | Mod: HCNC | Performed by: FAMILY MEDICINE

## 2024-02-19 PROCEDURE — 21400001 HC TELEMETRY ROOM: Mod: HCNC

## 2024-02-19 PROCEDURE — 25000003 PHARM REV CODE 250: Mod: HCNC | Performed by: STUDENT IN AN ORGANIZED HEALTH CARE EDUCATION/TRAINING PROGRAM

## 2024-02-19 PROCEDURE — 36415 COLL VENOUS BLD VENIPUNCTURE: CPT | Mod: HCNC | Performed by: STUDENT IN AN ORGANIZED HEALTH CARE EDUCATION/TRAINING PROGRAM

## 2024-02-19 PROCEDURE — 83735 ASSAY OF MAGNESIUM: CPT | Mod: HCNC | Performed by: STUDENT IN AN ORGANIZED HEALTH CARE EDUCATION/TRAINING PROGRAM

## 2024-02-19 PROCEDURE — 80048 BASIC METABOLIC PNL TOTAL CA: CPT | Mod: HCNC | Performed by: STUDENT IN AN ORGANIZED HEALTH CARE EDUCATION/TRAINING PROGRAM

## 2024-02-19 RX ORDER — ACETAMINOPHEN 325 MG/1
650 TABLET ORAL ONCE
Status: COMPLETED | OUTPATIENT
Start: 2024-02-19 | End: 2024-02-19

## 2024-02-19 RX ORDER — METHOCARBAMOL 750 MG/1
750 TABLET, FILM COATED ORAL 3 TIMES DAILY PRN
Status: DISCONTINUED | OUTPATIENT
Start: 2024-02-19 | End: 2024-02-20 | Stop reason: HOSPADM

## 2024-02-19 RX ADMIN — FERROUS SULFATE TAB 325 MG (65 MG ELEMENTAL FE) 1 EACH: 325 (65 FE) TAB at 08:02

## 2024-02-19 RX ADMIN — SODIUM CHLORIDE, POTASSIUM CHLORIDE, SODIUM LACTATE AND CALCIUM CHLORIDE: 600; 310; 30; 20 INJECTION, SOLUTION INTRAVENOUS at 12:02

## 2024-02-19 RX ADMIN — CHOLECALCIFEROL TAB 25 MCG (1000 UNIT) 1000 UNITS: 25 TAB at 08:02

## 2024-02-19 RX ADMIN — ACETAMINOPHEN 650 MG: 325 TABLET ORAL at 02:02

## 2024-02-19 RX ADMIN — METHOCARBAMOL TABLETS 750 MG: 750 TABLET, COATED ORAL at 06:02

## 2024-02-19 RX ADMIN — OXYCODONE HYDROCHLORIDE AND ACETAMINOPHEN 500 MG: 500 TABLET ORAL at 08:02

## 2024-02-19 RX ADMIN — POLYETHYLENE GLYCOL 3350 17 G: 17 POWDER, FOR SOLUTION ORAL at 08:02

## 2024-02-19 RX ADMIN — ASPIRIN 81 MG: 81 TABLET, COATED ORAL at 08:02

## 2024-02-19 RX ADMIN — SODIUM CHLORIDE, POTASSIUM CHLORIDE, SODIUM LACTATE AND CALCIUM CHLORIDE: 600; 310; 30; 20 INJECTION, SOLUTION INTRAVENOUS at 07:02

## 2024-02-19 RX ADMIN — PANTOPRAZOLE SODIUM 40 MG: 40 INJECTION, POWDER, LYOPHILIZED, FOR SOLUTION INTRAVENOUS at 08:02

## 2024-02-19 RX ADMIN — ATORVASTATIN CALCIUM 20 MG: 20 TABLET, FILM COATED ORAL at 08:02

## 2024-02-19 NOTE — SUBJECTIVE & OBJECTIVE
Interval History:  awake and alert, no new complaint,    H/H slightly down trending- monitor continue Aspirin      Review of Systems   Constitutional:  Negative for chills, fatigue and fever.   Respiratory:  Negative for chest tightness, shortness of breath and wheezing.    Cardiovascular:  Negative for chest pain, palpitations and leg swelling.   Gastrointestinal:  Negative for abdominal distention, abdominal pain, constipation, diarrhea, nausea and vomiting.   Genitourinary:  Negative for dysuria, flank pain and hematuria.   Musculoskeletal:  Negative for joint swelling.   Neurological:  Negative for seizures and speech difficulty.   Psychiatric/Behavioral:  Negative for agitation and confusion.      Objective:     Vital Signs (Most Recent):  Temp: 98.2 °F (36.8 °C) (02/19/24 1141)  Pulse: 89 (02/19/24 1141)  Resp: 19 (02/19/24 1141)  BP: (!) 122/59 (02/19/24 1141)  SpO2: 100 % (02/19/24 1141) Vital Signs (24h Range):  Temp:  [97 °F (36.1 °C)-98.4 °F (36.9 °C)] 98.2 °F (36.8 °C)  Pulse:  [58-89] 89  Resp:  [16-20] 19  SpO2:  [91 %-100 %] 100 %  BP: (102-133)/(56-89) 122/59     Weight: 68.3 kg (150 lb 9.2 oz)  Body mass index is 19.33 kg/m².    Intake/Output Summary (Last 24 hours) at 2/19/2024 1237  Last data filed at 2/19/2024 0625  Gross per 24 hour   Intake 3402.41 ml   Output 200 ml   Net 3202.41 ml           Physical Exam  Constitutional:       General: He is not in acute distress.     Appearance: Normal appearance.   HENT:      Head: Normocephalic.      Right Ear: External ear normal.      Left Ear: External ear normal.   Cardiovascular:      Rate and Rhythm: Normal rate and regular rhythm.   Pulmonary:      Effort: Pulmonary effort is normal. No respiratory distress.      Breath sounds: Normal breath sounds. No wheezing.   Abdominal:      General: Bowel sounds are normal. There is no distension.      Palpations: Abdomen is soft.      Tenderness: There is no abdominal tenderness.   Musculoskeletal:          General: No swelling. Normal range of motion.      Cervical back: Neck supple.   Skin:     General: Skin is warm.      Capillary Refill: Capillary refill takes less than 2 seconds.      Findings: No erythema.   Neurological:      General: No focal deficit present.      Mental Status: He is oriented to person, place, and time. Mental status is at baseline.   Psychiatric:         Mood and Affect: Mood normal.         Behavior: Behavior normal.             Significant Labs: All pertinent labs within the past 24 hours have been reviewed.    Significant Imaging: I have reviewed all pertinent imaging results/findings within the past 24 hours.

## 2024-02-19 NOTE — PLAN OF CARE
Pt is disoriented to time and situation. Pt reported no pain during shift. LR infusing at 100. Purewick in place. Dysphagia soft diet. Care administered as ordered.

## 2024-02-19 NOTE — PLAN OF CARE
02/19/24 1030   Rounds   Attendance Provider;Nurse    Discharge Plan A Return to nursing home  ([facility])   Why the patient remains in the hospital Requires continued medical care   Transition of Care Barriers Transportation       1030  CM was informed by Dr Thomas that the pt is not medically stable to discharge back to the [facility] today due to decreased H&H this AM. H&H 8.5/27.1 this AM & was 9.2/29.1 yesterday).     1415  CM received a call from Michelle ([phone], [phone]) questioning that pt's discharge status. CM informed Michelle of above. Michelle reminded this CM of covid test needed prior to return.     1975  Updated notes manually faxed to Michelle (f [phone]). Covid test ordered.       Will continue to follow.

## 2024-02-19 NOTE — PLAN OF CARE
AOX2. Disoriented to time and situation. VS stable. Safety maintained. Meds given per MAR. Pain treated with PRN meds. Denies N/V at this time. Blood glucose monitored. IV fluids infusing per orders. Dysphagia mechanical soft diet maintained. Resting quietly. SR up X 2. Call light in reach. Bed alarm set. Pt denies any further needs at this time. Plan of care ongoing.       Problem: Adult Inpatient Plan of Care  Goal: Plan of Care Review  Outcome: Ongoing, Progressing  Goal: Patient-Specific Goal (Individualized)  Outcome: Ongoing, Progressing     Problem: Diabetes Comorbidity  Goal: Blood Glucose Level Within Targeted Range  Outcome: Ongoing, Progressing     Problem: Constipation  Goal: Effective Bowel Elimination  Outcome: Ongoing, Progressing

## 2024-02-19 NOTE — NURSING
Pt complaing of 7/10 pain to lower back. MD notified. New orders received. Pt denies any further needs at this time. Plan of care ongoing.

## 2024-02-19 NOTE — PROGRESS NOTES
[facility]  Progress Note    Patient Name: Axel Burgos  MRN: [id number]  Patient Class: IP- Inpatient   Admission Date: 2/14/2024  Length of Stay: 3 days  Attending Physician: Eulalia Cornejo*  Primary Care Provider: Home, [facility] [place]        Subjective:     Principal Problem:GI bleed        HPI:  Axel Burgos is a 78 y.o. male with  PMHx of DM2, CVA x2 on apixaban and ASA, HTN, and varicose veins presents to [facility] ED via EMS from [facility] for symptomatic anemia (hgb 6.0) and positive occult blood during routine testing. Pt denies ABD pain, N/V/D/C, CP, SOB, dizziness. States he has been eating, drinking, and taking PO meds w/o issue. ED workup significant for Hgb 7.2, K 5.4, BUN/Cr 25/1.1, CTA ABD shows fecal impaction and non-obstructing nephrolithiasis. Given one unit PRBCs, and IV protonix in ED. BP stable in low 100s. No active bleeding from rectum. Admit hospital medicine and consult GI for evaluation    Overview/Hospital Course:  No notes on file    Interval History:  awake and alert, no new complaint,    H/H slightly down trending- monitor continue Aspirin      Review of Systems   Constitutional:  Negative for chills, fatigue and fever.   Respiratory:  Negative for chest tightness, shortness of breath and wheezing.    Cardiovascular:  Negative for chest pain, palpitations and leg swelling.   Gastrointestinal:  Negative for abdominal distention, abdominal pain, constipation, diarrhea, nausea and vomiting.   Genitourinary:  Negative for dysuria, flank pain and hematuria.   Musculoskeletal:  Negative for joint swelling.   Neurological:  Negative for seizures and speech difficulty.   Psychiatric/Behavioral:  Negative for agitation and confusion.      Objective:     Vital Signs (Most Recent):  Temp: 98.2 °F (36.8 °C) (02/19/24 1141)  Pulse: 89 (02/19/24 1141)  Resp: 19 (02/19/24 1141)  BP: (!) 122/59 (02/19/24 1141)  SpO2: 100 % (02/19/24 1141) Vital Signs  (24h Range):  Temp:  [97 °F (36.1 °C)-98.4 °F (36.9 °C)] 98.2 °F (36.8 °C)  Pulse:  [58-89] 89  Resp:  [16-20] 19  SpO2:  [91 %-100 %] 100 %  BP: (102-133)/(56-89) 122/59     Weight: 68.3 kg (150 lb 9.2 oz)  Body mass index is 19.33 kg/m².    Intake/Output Summary (Last 24 hours) at 2/19/2024 1237  Last data filed at 2/19/2024 0625  Gross per 24 hour   Intake 3402.41 ml   Output 200 ml   Net 3202.41 ml           Physical Exam  Constitutional:       General: He is not in acute distress.     Appearance: Normal appearance.   HENT:      Head: Normocephalic.      Right Ear: External ear normal.      Left Ear: External ear normal.   Cardiovascular:      Rate and Rhythm: Normal rate and regular rhythm.   Pulmonary:      Effort: Pulmonary effort is normal. No respiratory distress.      Breath sounds: Normal breath sounds. No wheezing.   Abdominal:      General: Bowel sounds are normal. There is no distension.      Palpations: Abdomen is soft.      Tenderness: There is no abdominal tenderness.   Musculoskeletal:         General: No swelling. Normal range of motion.      Cervical back: Neck supple.   Skin:     General: Skin is warm.      Capillary Refill: Capillary refill takes less than 2 seconds.      Findings: No erythema.   Neurological:      General: No focal deficit present.      Mental Status: He is oriented to person, place, and time. Mental status is at baseline.   Psychiatric:         Mood and Affect: Mood normal.         Behavior: Behavior normal.             Significant Labs: All pertinent labs within the past 24 hours have been reviewed.    Significant Imaging: I have reviewed all pertinent imaging results/findings within the past 24 hours.    Assessment/Plan:      * GI bleed  Positive stool occult blood  Hold long term oral anticoagulant  Trend H/H   NPO  Trend to keep Hgb> 7  Protonix IV  Consult GI    2/15  -evaluated by GI we will plan for endoscopy however patient has declined and son is aware  -we will  reach out to GI tomorrow to see if it is safe to resume Eliquis    2/16  -hemoglobin dropped from 8.2-6.9  -consented for blood transfusion  -transfusing 1 unit of PRBC  -holding anticoagulation  -patient seen to have very poor insight into his medical condition, Psychiatry and palliative Care consulted.    2/17-2/18  -H&H stable  -follow up on H pylori test        Dementia without behavioral disturbance  Patient with dementia with likely etiology of unknown dementia. Dementia is moderate. The patient does not have signs of behavioral disturbance. Home dementia medications are Held or Continued:  Not on medication . Continue non-pharmacologic interventions to prevent delirium (No VS between 11PM-5AM, activity during day, opening blinds, providing glasses/hearing aids, and up in chair during daytime). Will avoid narcotics and benzos unless absolutely necessary. PRN anti-psychotics are not prescribed to avoid self harm behaviors.    Patient without behavior disturbance  Psychiatry evaluated patient to determine if he has capacity given his poor insight into his medical condition    Plan  -patient lacks capacity  -patient's son Axel Burgos [phone]) who have POA to make decisions  -frequent reorientation as needed  -avoid and or minimal the use of opioid, anticholinergic, benzodiazepines or other sedating medication    Chronic idiopathic constipation  Started on MiraLax daily  If no bowel movement tomorrow we will increase to b.i.d.    2/16  -having bowel movement    Normocytic anemia  Patient's anemia is currently controlled. Has not received any PRBCs to date. Etiology likely d/t chronic blood loss  Current CBC reviewed-   Lab Results   Component Value Date    HGB 9.2 (L) 02/18/2024    HCT 29.1 (L) 02/18/2024     Monitor serial CBC and transfuse if patient becomes hemodynamically unstable, symptomatic or H/H drops below 7/21.    Symptomatic anemia  Patient's anemia is currently controlled. Has not received any  PRBCs to date. Etiology likely d/t chronic blood loss  Current CBC reviewed-   Lab Results   Component Value Date    HGB 9.2 (L) 02/18/2024    HCT 29.1 (L) 02/18/2024     Monitor serial CBC and transfuse if patient becomes hemodynamically unstable, symptomatic or H/H drops below 7/21.    Acute blood loss anemia  Anemia of chronic disease  Symptomatic anemia  Hypotensive on admit    Patient's anemia is currently uncontrolled. Has received 1 units of PRBCs on 2/4 in the ED on arrival . Etiology likely d/t acute blood loss which was from GI bleed  Current CBC reviewed-   Lab Results   Component Value Date    HGB 9.2 (L) 02/18/2024    HCT 29.1 (L) 02/18/2024     Monitor serial CBC and transfuse if patient becomes hemodynamically unstable, symptomatic or H/H drops below 7/21.    Anticoagulant long-term use  This patient has long term use on an anticoagulant with Select Anticoagulant(s): Direct oral anticoagulant: Apixaban (Eliquis). Their long term anticoagulation will be Held or Continued: held. They are on long term anticoagulation due to Reason for Anticoagulation: Stroke prevention.   Hold due to GI bleed    2/16  Hemoglobin dropped to 6.9 from 8.2  Holding anticoagulation    2/17-2/18  -per chart review no note from Neurology or Cardiology regarding why patient is on anticoagulation  -there is a documented history of TIA/CVA, questioning if patient require full anticoagulation for CVA prevention especially in the setting of GI bleed  -neurology consulted, recs starting ASA, to DC Eliquis if started for stroke prevention  -started on aspirin    Mild protein malnutrition  Nutrition consulted. Most recent weight and BMI monitored-     Measurements:  Wt Readings from Last 1 Encounters:   02/17/24 68.7 kg (151 lb 7.3 oz)   Body mass index is 19.45 kg/m².    Patient has been screened and assessed by RD.    Malnutrition Type:  Context:    Level:      Malnutrition Characteristic Summary:       Interventions/Recommendations  (treatment strategy):         Anemia of chronic disease  Patient's anemia is currently controlled. Has not received any PRBCs to date. Etiology likely d/t chronic blood loss  Current CBC reviewed-   Lab Results   Component Value Date    HGB 9.2 (L) 02/18/2024    HCT 29.1 (L) 02/18/2024     Monitor serial CBC and transfuse if patient becomes hemodynamically unstable, symptomatic or H/H drops below 7/21.    Type 2 diabetes mellitus, controlled  Patient's FSGs are controlled on current medication regimen.  Last A1c reviewed-   Lab Results   Component Value Date    HGBA1C 5.0 02/14/2024     Most recent fingerstick glucose reviewed-   Recent Labs   Lab 02/17/24  1548 02/17/24  2047 02/18/24  0546 02/18/24  1110   POCTGLUCOSE 115* 108 95 103       Current correctional scale  Low  Maintain anti-hyperglycemic dose as follows-   Antihyperglycemics (From admission, onward)      Start     Stop Route Frequency Ordered    02/14/24 1507  insulin aspart U-100 pen 0-5 Units         -- SubQ Before meals & nightly PRN 02/14/24 1407          Hold Oral hypoglycemics while patient is in the hospital- not on home oral antiglycemia    Essential hypertension  Chronic, uncontrolled. Latest blood pressure and vitals reviewed-     Temp:  [97 °F (36.1 °C)-98 °F (36.7 °C)]   Pulse:  []   Resp:  [16-20]   BP: ()/(54-79)   SpO2:  [93 %-98 %] .   Home meds for hypertension were reviewed and noted below.   Hypertension Medications               amLODIPine (NORVASC) 2.5 MG tablet Take 2.5 mg by mouth once daily.    lisinopriL (PRINIVIL,ZESTRIL) 5 MG tablet Take 5 mg by mouth once daily.            While in the hospital, will manage blood pressure as follows; Adjust home antihypertensive regimen as follows- hold due to hypotension on admit    2/18  -blood pressure still borderline, continue to hold antihypertensive medication    Chronic venous insufficiency  chronic        VTE Risk Mitigation (From admission, onward)           Ordered      IP VTE HIGH RISK PATIENT  Once         02/14/24 1407     Place sequential compression device  Until discontinued         02/14/24 1407                    Discharge Planning   JESSI: 2/19/2024     Code Status: Full Code   Is the patient medically ready for discharge?:     Reason for patient still in hospital (select all that apply): Patient trending condition  Discharge Plan A: Return to nursing home ([facility])            Eulalia Cornejo MD  Department of Hospital Medicine   [facility]

## 2024-02-20 VITALS
BODY MASS INDEX: 19.78 KG/M2 | WEIGHT: 154.13 LBS | HEIGHT: 74 IN | SYSTOLIC BLOOD PRESSURE: 131 MMHG | RESPIRATION RATE: 16 BRPM | OXYGEN SATURATION: 96 % | TEMPERATURE: 98 F | HEART RATE: 83 BPM | DIASTOLIC BLOOD PRESSURE: 75 MMHG

## 2024-02-20 LAB
ANION GAP SERPL CALC-SCNC: 9 MMOL/L (ref 8–16)
BASOPHILS # BLD AUTO: 0.06 K/UL (ref 0–0.2)
BASOPHILS NFR BLD: 0.6 % (ref 0–1.9)
BUN SERPL-MCNC: 23 MG/DL (ref 8–23)
CALCIUM SERPL-MCNC: 8.3 MG/DL (ref 8.7–10.5)
CHLORIDE SERPL-SCNC: 103 MMOL/L (ref 95–110)
CO2 SERPL-SCNC: 22 MMOL/L (ref 23–29)
CREAT SERPL-MCNC: 2.2 MG/DL (ref 0.5–1.4)
DIFFERENTIAL METHOD BLD: ABNORMAL
EOSINOPHIL # BLD AUTO: 0.2 K/UL (ref 0–0.5)
EOSINOPHIL NFR BLD: 2.1 % (ref 0–8)
ERYTHROCYTE [DISTWIDTH] IN BLOOD BY AUTOMATED COUNT: 19.3 % (ref 11.5–14.5)
EST. GFR  (NO RACE VARIABLE): 30 ML/MIN/1.73 M^2
GLUCOSE SERPL-MCNC: 71 MG/DL (ref 70–110)
HCT VFR BLD AUTO: 27.8 % (ref 40–54)
HGB BLD-MCNC: 8.8 G/DL (ref 14–18)
IMM GRANULOCYTES # BLD AUTO: 0.09 K/UL (ref 0–0.04)
IMM GRANULOCYTES NFR BLD AUTO: 0.8 % (ref 0–0.5)
LYMPHOCYTES # BLD AUTO: 1.8 K/UL (ref 1–4.8)
LYMPHOCYTES NFR BLD: 16.6 % (ref 18–48)
MCH RBC QN AUTO: 29.1 PG (ref 27–31)
MCHC RBC AUTO-ENTMCNC: 31.7 G/DL (ref 32–36)
MCV RBC AUTO: 92 FL (ref 82–98)
MONOCYTES # BLD AUTO: 1 K/UL (ref 0.3–1)
MONOCYTES NFR BLD: 9.3 % (ref 4–15)
NEUTROPHILS # BLD AUTO: 7.5 K/UL (ref 1.8–7.7)
NEUTROPHILS NFR BLD: 70.6 % (ref 38–73)
NRBC BLD-RTO: 0 /100 WBC
PLATELET # BLD AUTO: 246 K/UL (ref 150–450)
PMV BLD AUTO: 10.3 FL (ref 9.2–12.9)
POCT GLUCOSE: 80 MG/DL (ref 70–110)
POTASSIUM SERPL-SCNC: 4.8 MMOL/L (ref 3.5–5.1)
RBC # BLD AUTO: 3.02 M/UL (ref 4.6–6.2)
SODIUM SERPL-SCNC: 134 MMOL/L (ref 136–145)
WBC # BLD AUTO: 10.59 K/UL (ref 3.9–12.7)

## 2024-02-20 PROCEDURE — 85025 COMPLETE CBC W/AUTO DIFF WBC: CPT | Mod: HCNC | Performed by: STUDENT IN AN ORGANIZED HEALTH CARE EDUCATION/TRAINING PROGRAM

## 2024-02-20 PROCEDURE — 99497 ADVNCD CARE PLAN 30 MIN: CPT | Mod: HCNC,,, | Performed by: STUDENT IN AN ORGANIZED HEALTH CARE EDUCATION/TRAINING PROGRAM

## 2024-02-20 PROCEDURE — C9113 INJ PANTOPRAZOLE SODIUM, VIA: HCPCS | Mod: HCNC | Performed by: FAMILY MEDICINE

## 2024-02-20 PROCEDURE — 80048 BASIC METABOLIC PNL TOTAL CA: CPT | Mod: HCNC | Performed by: STUDENT IN AN ORGANIZED HEALTH CARE EDUCATION/TRAINING PROGRAM

## 2024-02-20 PROCEDURE — 36415 COLL VENOUS BLD VENIPUNCTURE: CPT | Mod: HCNC | Performed by: STUDENT IN AN ORGANIZED HEALTH CARE EDUCATION/TRAINING PROGRAM

## 2024-02-20 PROCEDURE — 63600175 PHARM REV CODE 636 W HCPCS: Mod: HCNC | Performed by: FAMILY MEDICINE

## 2024-02-20 PROCEDURE — 1153F DOC ADVNCD DIS CMFRT NOT 1ST: CPT | Mod: HCNC,CPTII,, | Performed by: STUDENT IN AN ORGANIZED HEALTH CARE EDUCATION/TRAINING PROGRAM

## 2024-02-20 PROCEDURE — 1158F ADVNC CARE PLAN TLK DOCD: CPT | Mod: HCNC,CPTII,, | Performed by: STUDENT IN AN ORGANIZED HEALTH CARE EDUCATION/TRAINING PROGRAM

## 2024-02-20 PROCEDURE — 51798 US URINE CAPACITY MEASURE: CPT | Mod: HCNC

## 2024-02-20 PROCEDURE — 25000003 PHARM REV CODE 250: Mod: HCNC | Performed by: STUDENT IN AN ORGANIZED HEALTH CARE EDUCATION/TRAINING PROGRAM

## 2024-02-20 RX ADMIN — ATORVASTATIN CALCIUM 20 MG: 20 TABLET, FILM COATED ORAL at 09:02

## 2024-02-20 RX ADMIN — PANTOPRAZOLE SODIUM 40 MG: 40 INJECTION, POWDER, LYOPHILIZED, FOR SOLUTION INTRAVENOUS at 10:02

## 2024-02-20 RX ADMIN — ASPIRIN 81 MG: 81 TABLET, COATED ORAL at 09:02

## 2024-02-20 RX ADMIN — OXYCODONE HYDROCHLORIDE AND ACETAMINOPHEN 500 MG: 500 TABLET ORAL at 09:02

## 2024-02-20 RX ADMIN — SODIUM CHLORIDE, POTASSIUM CHLORIDE, SODIUM LACTATE AND CALCIUM CHLORIDE: 600; 310; 30; 20 INJECTION, SOLUTION INTRAVENOUS at 04:02

## 2024-02-20 RX ADMIN — POLYETHYLENE GLYCOL 3350 17 G: 17 POWDER, FOR SOLUTION ORAL at 09:02

## 2024-02-20 RX ADMIN — CHOLECALCIFEROL TAB 25 MCG (1000 UNIT) 1000 UNITS: 25 TAB at 09:02

## 2024-02-20 RX ADMIN — FERROUS SULFATE TAB 325 MG (65 MG ELEMENTAL FE) 1 EACH: 325 (65 FE) TAB at 09:02

## 2024-02-20 NOTE — DISCHARGE SUMMARY
[facility] Medicine  Discharge Summary      Patient Name: Axel Burgos  MRN: [id number]  JERAD: [id number]  Patient Class: IP- Inpatient  Admission Date: 2/14/2024  Hospital Length of Stay: 4 days  Discharge Date and Time: 2/20/2024  3:00 PM  Attending Physician: Eulalia Cornejo*   Discharging Provider: Eulalia Cornejo MD  Primary Care Provider: Home, [facility]    Primary Care Team: Networked reference to record PCT     HPI:   Axel Burgos is a 78 y.o. male with  PMHx of DM2, CVA x2 on apixaban and ASA, HTN, and varicose veins presents to [facility] ED via EMS from [facility] for symptomatic anemia (hgb 6.0) and positive occult blood during routine testing. Pt denies ABD pain, N/V/D/C, CP, SOB, dizziness. States he has been eating, drinking, and taking PO meds w/o issue. ED workup significant for Hgb 7.2, K 5.4, BUN/Cr 25/1.1, CTA ABD shows fecal impaction and non-obstructing nephrolithiasis. Given one unit PRBCs, and IV protonix in ED. BP stable in low 100s. No active bleeding from rectum. Admit hospital medicine and consult GI for evaluation    * No surgery found *      Hospital Course:   No notes on file     Goals of Care Treatment Preferences:  Code Status: Full Code    Health care agent: Pauline Burgos  [facility] agent number: [phone]                   Consults:   Consults (From admission, onward)          Status Ordering Provider     Inpatient consult to [facility]  Once        Provider:  (Not yet assigned)    Completed ASHU LOPEZ     Inpatient consult to Palliative Care  Once        Provider:  (Not yet assigned)    Completed ASHU LOPEZ     Inpatient consult to Gastroenterology  Once        Provider:  Cali Garnica MD    Completed EULALIA CORNEJO            Neuro  Dementia without behavioral disturbance  Patient with dementia with likely etiology of unknown dementia. Dementia is moderate. The patient does not have signs of  behavioral disturbance. Home dementia medications are Held or Continued:  Not on medication . Continue non-pharmacologic interventions to prevent delirium (No VS between 11PM-5AM, activity during day, opening blinds, providing glasses/hearing aids, and up in chair during daytime). Will avoid narcotics and benzos unless absolutely necessary. PRN anti-psychotics are not prescribed to avoid self harm behaviors.    Patient without behavior disturbance  Psychiatry evaluated patient to determine if he has capacity given his poor insight into his medical condition    Plan  -patient lacks capacity  -patient's son Axel Burgos ([phone]) who have POA to make decisions  -frequent reorientation as needed  -avoid and or minimal the use of opioid, anticholinergic, benzodiazepines or other sedating medication    Cardiac/Vascular  Essential hypertension  Chronic, uncontrolled. Latest blood pressure and vitals reviewed-     Temp:  [97.3 °F (36.3 °C)-98.7 °F (37.1 °C)]   Pulse:  []   Resp:  [16-20]   BP: (121-144)/(57-75)   SpO2:  [92 %-100 %] .   Home meds for hypertension were reviewed and noted below.   Hypertension Medications               amLODIPine (NORVASC) 2.5 MG tablet Take 2.5 mg by mouth once daily.    lisinopriL (PRINIVIL,ZESTRIL) 5 MG tablet Take 5 mg by mouth once daily.            While in the hospital, will manage blood pressure as follows; Adjust home antihypertensive regimen as follows- hold due to hypotension on admit    2/18  -blood pressure still borderline, continue to hold antihypertensive medication    Chronic venous insufficiency  chronic      Oncology  Normocytic anemia  Patient's anemia is currently controlled. Has not received any PRBCs to date. Etiology likely d/t chronic blood loss  Current CBC reviewed-   Lab Results   Component Value Date    HGB 8.8 (L) 02/20/2024    HCT 27.8 (L) 02/20/2024     Monitor serial CBC and transfuse if patient becomes hemodynamically unstable, symptomatic or H/H  drops below 7/21.    Symptomatic anemia  Patient's anemia is currently controlled. Has not received any PRBCs to date. Etiology likely d/t chronic blood loss  Current CBC reviewed-   Lab Results   Component Value Date    HGB 8.8 (L) 02/20/2024    HCT 27.8 (L) 02/20/2024     Monitor serial CBC and transfuse if patient becomes hemodynamically unstable, symptomatic or H/H drops below 7/21.    Acute blood loss anemia  Anemia of chronic disease  Symptomatic anemia  Hypotensive on admit    Patient's anemia is currently uncontrolled. Has received 1 units of PRBCs on 2/4 in the ED on arrival . Etiology likely d/t acute blood loss which was from GI bleed  Current CBC reviewed-   Lab Results   Component Value Date    HGB 8.8 (L) 02/20/2024    HCT 27.8 (L) 02/20/2024     Monitor serial CBC and transfuse if patient becomes hemodynamically unstable, symptomatic or H/H drops below 7/21.    Anemia of chronic disease  Patient's anemia is currently controlled. Has not received any PRBCs to date. Etiology likely d/t chronic blood loss  Current CBC reviewed-   Lab Results   Component Value Date    HGB 8.8 (L) 02/20/2024    HCT 27.8 (L) 02/20/2024     Monitor serial CBC and transfuse if patient becomes hemodynamically unstable, symptomatic or H/H drops below 7/21.    Endocrine  Mild protein malnutrition  Nutrition consulted. Most recent weight and BMI monitored-     Measurements:  Wt Readings from Last 1 Encounters:   02/19/24 69.9 kg (154 lb 1.6 oz)   Body mass index is 19.79 kg/m².    Patient has been screened and assessed by RD.    Malnutrition Type:  Context:    Level:      Malnutrition Characteristic Summary:       Interventions/Recommendations (treatment strategy):         Type 2 diabetes mellitus, controlled  Patient's FSGs are controlled on current medication regimen.  Last A1c reviewed-   Lab Results   Component Value Date    HGBA1C 5.0 02/14/2024     Most recent fingerstick glucose reviewed-   Recent Labs   Lab 02/19/24  9114  02/19/24  1929 02/20/24  0508   POCTGLUCOSE 99 101 80       Current correctional scale  Low  Maintain anti-hyperglycemic dose as follows-   Antihyperglycemics (From admission, onward)      Start     Stop Route Frequency Ordered    02/14/24 1507  insulin aspart U-100 pen 0-5 Units         -- SubQ Before meals & nightly PRN 02/14/24 1407          Hold Oral hypoglycemics while patient is in the hospital- not on home oral antiglycemia    GI  * GI bleed  Positive stool occult blood  Hold long term oral anticoagulant  Trend H/H   NPO  Trend to keep Hgb> 7  Protonix IV  Consult GI    2/15  -evaluated by GI we will plan for endoscopy however patient has declined and son is aware  -we will reach out to GI tomorrow to see if it is safe to resume Eliquis    2/16  -hemoglobin dropped from 8.2-6.9  -consented for blood transfusion  -transfusing 1 unit of PRBC  -holding anticoagulation  -patient seen to have very poor insight into his medical condition, Psychiatry and palliative Care consulted.    2/17-2/18  -H&H stable  -follow up on H pylori test        Chronic idiopathic constipation  Started on MiraLax daily  If no bowel movement tomorrow we will increase to b.i.d.    2/16  -having bowel movement    Other  Anticoagulant long-term use  This patient has long term use on an anticoagulant with Select Anticoagulant(s): Direct oral anticoagulant: Apixaban (Eliquis). Their long term anticoagulation will be Held or Continued: held. They are on long term anticoagulation due to Reason for Anticoagulation: Stroke prevention.   Hold due to GI bleed    2/16  Hemoglobin dropped to 6.9 from 8.2  Holding anticoagulation    2/17-2/18  -per chart review no note from Neurology or Cardiology regarding why patient is on anticoagulation  -there is a documented history of TIA/CVA, questioning if patient require full anticoagulation for CVA prevention especially in the setting of GI bleed  -neurology consulted, recs starting ASA, to DC Eliquis if  started for stroke prevention  -started on aspirin      Final Active Diagnoses:    Diagnosis Date Noted POA    PRINCIPAL PROBLEM:  GI bleed [K92.2] 02/14/2024 Yes    Dementia without behavioral disturbance [F03.90] 02/17/2024 Yes    Acute blood loss anemia [D62] 02/14/2024 Yes    Symptomatic anemia [D64.9] 02/14/2024 Yes    Normocytic anemia [D64.9] 02/14/2024 Yes    Chronic idiopathic constipation [K59.04] 02/14/2024 Yes    Anticoagulant long-term use [Z79.01] 09/09/2022 Not Applicable    Anemia of chronic disease [D63.8] 07/18/2017 Yes     Chronic    Essential hypertension [I10] 07/18/2017 Yes     Chronic    Mild protein malnutrition [E44.1] 07/18/2017 Yes     Chronic    Type 2 diabetes mellitus, controlled [E11.9] 07/18/2017 Yes     Chronic    Chronic venous insufficiency [I87.2] 12/07/2015 Yes      Problems Resolved During this Admission:       Discharged Condition: stable    Disposition: Home or Self Care    Follow Up:    Patient Instructions:      Diet Cardiac     Activity as tolerated       Significant Diagnostic Studies: N/A    Pending Diagnostic Studies:       None           Medications:  Reconciled Home Medications:      Medication List        CONTINUE taking these medications      amLODIPine 2.5 MG tablet  Commonly known as: NORVASC  Take 2.5 mg by mouth once daily.     aspirin 81 MG EC tablet  Commonly known as: ECOTRIN  Take 81 mg by mouth once daily.     atorvastatin 20 MG tablet  Commonly known as: LIPITOR  Take 20 mg by mouth once daily.     CALMOSEPTINE 0.44-20.6 % Oint  Generic drug: menthol-zinc oxide  Apply topically as needed (redness/irritation). Apply to buttocks/groin     docusate sodium 100 MG capsule  Commonly known as: COLACE  Take 100 mg by mouth 2 (two) times daily.     ferrous sulfate 324 mg (65 mg iron) Tbec  Take 324 mg by mouth 2 (two) times daily.     GLUCERNA ORAL  Take 1 Can by mouth 3 (three) times daily.     lisinopriL 5 MG tablet  Commonly known as: PRINIVIL,ZESTRIL  Take 5 mg  by mouth once daily.     omeprazole 40 MG capsule  Commonly known as: PRILOSEC  Take 40 mg by mouth 2 (two) times a day.     TRULICITY 0.75 mg/0.5 mL pen injector  Generic drug: dulaglutide  Inject 0.75 mg into the skin every Thursday.     VITAMIN C 500 MG tablet  Generic drug: ascorbic acid (vitamin C)  Take 500 mg by mouth once daily.     vitamin D 1000 units Tab  Commonly known as: VITAMIN D3  Take 1,000 Units by mouth once daily.            STOP taking these medications      ELIQUIS 5 mg Tab  Generic drug: apixaban     povidone-iodine 10 % external solution  Commonly known as: BETADINE              Indwelling Lines/Drains at time of discharge:   Lines/Drains/Airways       None                   Time spent on the discharge of patient: 35 minutes         Eulalia Cornejo MD  Department of Hospital Medicine  [facility] Surg

## 2024-02-20 NOTE — PROGRESS NOTES
[facility] Medicine  Progress Note    Patient Name: Axel Burgos  MRN: [id number]  Patient Class: IP- Inpatient   Admission Date: 2/14/2024  Length of Stay: 4 days  Attending Physician: Eulalia Cornejo*  Primary Care Provider: Home, [facility]        Subjective:     Principal Problem:GI bleed        HPI:  Axel Burgos is a 78 y.o. male with  PMHx of DM2, CVA x2 on apixaban and ASA, HTN, and varicose veins presents to [facility] ED via EMS from [facility] for symptomatic anemia (hgb 6.0) and positive occult blood during routine testing. Pt denies ABD pain, N/V/D/C, CP, SOB, dizziness. States he has been eating, drinking, and taking PO meds w/o issue. ED workup significant for Hgb 7.2, K 5.4, BUN/Cr 25/1.1, CTA ABD shows fecal impaction and non-obstructing nephrolithiasis. Given one unit PRBCs, and IV protonix in ED. BP stable in low 100s. No active bleeding from rectum. Admit hospital medicine and consult GI for evaluation    Overview/Hospital Course:  No notes on file    Interval History:  awake and alert, no new complaint,    H/H stable- monitor continue Aspirin      Review of Systems   Constitutional:  Negative for chills, fatigue and fever.   Respiratory:  Negative for chest tightness, shortness of breath and wheezing.    Cardiovascular:  Negative for chest pain, palpitations and leg swelling.   Gastrointestinal:  Negative for abdominal distention, abdominal pain, constipation, diarrhea, nausea and vomiting.   Genitourinary:  Negative for dysuria, flank pain and hematuria.   Musculoskeletal:  Negative for joint swelling.   Neurological:  Negative for seizures and speech difficulty.   Psychiatric/Behavioral:  Negative for agitation and confusion.      Objective:     Vital Signs (Most Recent):  Temp: 98 °F (36.7 °C) (02/20/24 0730)  Pulse: 83 (02/20/24 0730)  Resp: 16 (02/20/24 0730)  BP: 131/75 (02/20/24 0730)  SpO2: 96 % (02/20/24 0730) Vital Signs (24h Range):  Temp:   [97.3 °F (36.3 °C)-98.7 °F (37.1 °C)] 98 °F (36.7 °C)  Pulse:  [] 83  Resp:  [16-20] 16  SpO2:  [92 %-100 %] 96 %  BP: (121-144)/(57-75) 131/75     Weight: 69.9 kg (154 lb 1.6 oz)  Body mass index is 19.79 kg/m².    Intake/Output Summary (Last 24 hours) at 2/20/2024 1437  Last data filed at 2/20/2024 0800  Gross per 24 hour   Intake 2473.32 ml   Output 0 ml   Net 2473.32 ml           Physical Exam  Constitutional:       General: He is not in acute distress.     Appearance: Normal appearance.   HENT:      Head: Normocephalic.      Right Ear: External ear normal.      Left Ear: External ear normal.   Cardiovascular:      Rate and Rhythm: Normal rate and regular rhythm.   Pulmonary:      Effort: Pulmonary effort is normal. No respiratory distress.      Breath sounds: Normal breath sounds. No wheezing.   Abdominal:      General: Bowel sounds are normal. There is no distension.      Palpations: Abdomen is soft.      Tenderness: There is no abdominal tenderness.   Musculoskeletal:         General: No swelling. Normal range of motion.      Cervical back: Neck supple.   Skin:     General: Skin is warm.      Capillary Refill: Capillary refill takes less than 2 seconds.      Findings: No erythema.   Neurological:      General: No focal deficit present.      Mental Status: He is oriented to person, place, and time. Mental status is at baseline.   Psychiatric:         Mood and Affect: Mood normal.         Behavior: Behavior normal.             Significant Labs: All pertinent labs within the past 24 hours have been reviewed.    Significant Imaging: I have reviewed all pertinent imaging results/findings within the past 24 hours.    Assessment/Plan:      * GI bleed  Positive stool occult blood  Hold long term oral anticoagulant  Trend H/H   NPO  Trend to keep Hgb> 7  Protonix IV  Consult GI    2/15  -evaluated by GI we will plan for endoscopy however patient has declined and son is aware  -we will reach out to GI tomorrow to  see if it is safe to resume Eliquis    2/16  -hemoglobin dropped from 8.2-6.9  -consented for blood transfusion  -transfusing 1 unit of PRBC  -holding anticoagulation  -patient seen to have very poor insight into his medical condition, Psychiatry and palliative Care consulted.    2/17-2/18  -H&H stable  -follow up on H pylori test        Dementia without behavioral disturbance  Patient with dementia with likely etiology of unknown dementia. Dementia is moderate. The patient does not have signs of behavioral disturbance. Home dementia medications are Held or Continued:  Not on medication . Continue non-pharmacologic interventions to prevent delirium (No VS between 11PM-5AM, activity during day, opening blinds, providing glasses/hearing aids, and up in chair during daytime). Will avoid narcotics and benzos unless absolutely necessary. PRN anti-psychotics are not prescribed to avoid self harm behaviors.    Patient without behavior disturbance  Psychiatry evaluated patient to determine if he has capacity given his poor insight into his medical condition    Plan  -patient lacks capacity  -patient's son Axel Burgos ([phone]) who have POA to make decisions  -frequent reorientation as needed  -avoid and or minimal the use of opioid, anticholinergic, benzodiazepines or other sedating medication    Chronic idiopathic constipation  Started on MiraLax daily  If no bowel movement tomorrow we will increase to b.i.d.    2/16  -having bowel movement    Normocytic anemia  Patient's anemia is currently controlled. Has not received any PRBCs to date. Etiology likely d/t chronic blood loss  Current CBC reviewed-   Lab Results   Component Value Date    HGB 8.8 (L) 02/20/2024    HCT 27.8 (L) 02/20/2024     Monitor serial CBC and transfuse if patient becomes hemodynamically unstable, symptomatic or H/H drops below 7/21.    Symptomatic anemia  Patient's anemia is currently controlled. Has not received any PRBCs to date. Etiology  likely d/t chronic blood loss  Current CBC reviewed-   Lab Results   Component Value Date    HGB 8.8 (L) 02/20/2024    HCT 27.8 (L) 02/20/2024     Monitor serial CBC and transfuse if patient becomes hemodynamically unstable, symptomatic or H/H drops below 7/21.    Acute blood loss anemia  Anemia of chronic disease  Symptomatic anemia  Hypotensive on admit    Patient's anemia is currently uncontrolled. Has received 1 units of PRBCs on 2/4 in the ED on arrival . Etiology likely d/t acute blood loss which was from GI bleed  Current CBC reviewed-   Lab Results   Component Value Date    HGB 9.2 (L) 02/18/2024    HCT 29.1 (L) 02/18/2024     Monitor serial CBC and transfuse if patient becomes hemodynamically unstable, symptomatic or H/H drops below 7/21.    Anticoagulant long-term use  This patient has long term use on an anticoagulant with Select Anticoagulant(s): Direct oral anticoagulant: Apixaban (Eliquis). Their long term anticoagulation will be Held or Continued: held. They are on long term anticoagulation due to Reason for Anticoagulation: Stroke prevention.   Hold due to GI bleed    2/16  Hemoglobin dropped to 6.9 from 8.2  Holding anticoagulation    2/17-2/18  -per chart review no note from Neurology or Cardiology regarding why patient is on anticoagulation  -there is a documented history of TIA/CVA, questioning if patient require full anticoagulation for CVA prevention especially in the setting of GI bleed  -neurology consulted, recs starting ASA, to DC Eliquis if started for stroke prevention  -started on aspirin    Mild protein malnutrition  Nutrition consulted. Most recent weight and BMI monitored-     Measurements:  Wt Readings from Last 1 Encounters:   02/19/24 69.9 kg (154 lb 1.6 oz)   Body mass index is 19.79 kg/m².    Patient has been screened and assessed by RD.    Malnutrition Type:  Context:    Level:      Malnutrition Characteristic Summary:       Interventions/Recommendations (treatment strategy):          Anemia of chronic disease  Patient's anemia is currently controlled. Has not received any PRBCs to date. Etiology likely d/t chronic blood loss  Current CBC reviewed-   Lab Results   Component Value Date    HGB 8.8 (L) 02/20/2024    HCT 27.8 (L) 02/20/2024     Monitor serial CBC and transfuse if patient becomes hemodynamically unstable, symptomatic or H/H drops below 7/21.    Type 2 diabetes mellitus, controlled  Patient's FSGs are controlled on current medication regimen.  Last A1c reviewed-   Lab Results   Component Value Date    HGBA1C 5.0 02/14/2024     Most recent fingerstick glucose reviewed-   Recent Labs   Lab 02/19/24  1525 02/19/24  1929 02/20/24  0508   POCTGLUCOSE 99 101 80       Current correctional scale  Low  Maintain anti-hyperglycemic dose as follows-   Antihyperglycemics (From admission, onward)      Start     Stop Route Frequency Ordered    02/14/24 1507  insulin aspart U-100 pen 0-5 Units         -- SubQ Before meals & nightly PRN 02/14/24 1407          Hold Oral hypoglycemics while patient is in the hospital- not on home oral antiglycemia    Essential hypertension  Chronic, uncontrolled. Latest blood pressure and vitals reviewed-     Temp:  [97.3 °F (36.3 °C)-98.7 °F (37.1 °C)]   Pulse:  []   Resp:  [16-20]   BP: (121-144)/(57-75)   SpO2:  [92 %-100 %] .   Home meds for hypertension were reviewed and noted below.   Hypertension Medications               amLODIPine (NORVASC) 2.5 MG tablet Take 2.5 mg by mouth once daily.    lisinopriL (PRINIVIL,ZESTRIL) 5 MG tablet Take 5 mg by mouth once daily.            While in the hospital, will manage blood pressure as follows; Adjust home antihypertensive regimen as follows- hold due to hypotension on admit    2/18  -blood pressure still borderline, continue to hold antihypertensive medication    Chronic venous insufficiency  chronic        VTE Risk Mitigation (From admission, onward)           Ordered     IP VTE HIGH RISK PATIENT  Once          02/14/24 1407     Place sequential compression device  Until discontinued         02/14/24 1407                    Discharge Planning   JESSI: 2/20/2024     Code Status: Full Code   Is the patient medically ready for discharge?:     Reason for patient still in hospital (select all that apply): Pending disposition  Discharge Plan A: Return to nursing home ([facility])                  Eulalia Cornejo MD  Department of Hospital Medicine   [facility] S[facility]

## 2024-02-20 NOTE — ASSESSMENT & PLAN NOTE
Patient's anemia is currently controlled. Has not received any PRBCs to date. Etiology likely d/t chronic blood loss  Current CBC reviewed-   Lab Results   Component Value Date    HGB 8.8 (L) 02/20/2024    HCT 27.8 (L) 02/20/2024     Monitor serial CBC and transfuse if patient becomes hemodynamically unstable, symptomatic or H/H drops below 7/21.

## 2024-02-20 NOTE — PLAN OF CARE
Bladder scan 163 ml, pt refuses to  drink anything,doesn't have urge to void,ivf in progress,dr hewitt notified. Continue poc.

## 2024-02-20 NOTE — ASSESSMENT & PLAN NOTE
Anemia of chronic disease  Symptomatic anemia  Hypotensive on admit    Patient's anemia is currently uncontrolled. Has received 1 units of PRBCs on 2/4 in the ED on arrival . Etiology likely d/t acute blood loss which was from GI bleed  Current CBC reviewed-   Lab Results   Component Value Date    HGB 8.8 (L) 02/20/2024    HCT 27.8 (L) 02/20/2024     Monitor serial CBC and transfuse if patient becomes hemodynamically unstable, symptomatic or H/H drops below 7/21.

## 2024-02-20 NOTE — ASSESSMENT & PLAN NOTE
Patient's FSGs are controlled on current medication regimen.  Last A1c reviewed-   Lab Results   Component Value Date    HGBA1C 5.0 02/14/2024     Most recent fingerstick glucose reviewed-   Recent Labs   Lab 02/19/24  1525 02/19/24  1929 02/20/24  0508   POCTGLUCOSE 99 101 80       Current correctional scale  Low  Maintain anti-hyperglycemic dose as follows-   Antihyperglycemics (From admission, onward)    Start     Stop Route Frequency Ordered    02/14/24 1507  insulin aspart U-100 pen 0-5 Units         -- SubQ Before meals & nightly PRN 02/14/24 1407        Hold Oral hypoglycemics while patient is in the hospital- not on home oral antiglycemia

## 2024-02-20 NOTE — ASSESSMENT & PLAN NOTE
Nutrition consulted. Most recent weight and BMI monitored-     Measurements:  Wt Readings from Last 1 Encounters:   02/19/24 69.9 kg (154 lb 1.6 oz)   Body mass index is 19.79 kg/m².    Patient has been screened and assessed by RD.    Malnutrition Type:  Context:    Level:      Malnutrition Characteristic Summary:       Interventions/Recommendations (treatment strategy):

## 2024-02-20 NOTE — ACP (ADVANCE CARE PLANNING)
Advance Care Planning     Date: 02/20/2024    Power of   I initiated the process of voluntary advance care planning today and explained the importance of this process to the patient.  I introduced the concept of advance directives to the patient, as well. Then the patient received detailed information about the importance of designating a Health Care Power of  (HCPOA). He was also instructed to communicate with this person about their wishes for future healthcare, should he become sick and lose decision-making capacity. The patient has not previously appointed a HCPOA. After our discussion, the patient has decided to complete a HCPOA and has appointed his son, health care agent:  Pauline Burgos  & health care agent number:  [phone]  I spent a total time of 20 minutes discussing this issue with the patient.            Pedrito Faulkner MD  Hospice and Palliative Medicine  Palliative Care Pager: [phone]

## 2024-02-20 NOTE — ASSESSMENT & PLAN NOTE
Chronic, uncontrolled. Latest blood pressure and vitals reviewed-     Temp:  [97.3 °F (36.3 °C)-98.7 °F (37.1 °C)]   Pulse:  []   Resp:  [16-20]   BP: (121-144)/(57-75)   SpO2:  [92 %-100 %] .   Home meds for hypertension were reviewed and noted below.   Hypertension Medications               amLODIPine (NORVASC) 2.5 MG tablet Take 2.5 mg by mouth once daily.    lisinopriL (PRINIVIL,ZESTRIL) 5 MG tablet Take 5 mg by mouth once daily.            While in the hospital, will manage blood pressure as follows; Adjust home antihypertensive regimen as follows- hold due to hypotension on admit    2/18  -blood pressure still borderline, continue to hold antihypertensive medication

## 2024-02-20 NOTE — PLAN OF CARE
"0810  Call received from Michelle ([phone], ext [phone]) w/the [facility] questioning if the pt is medically stable to dc today.     0820  Message sent to Dr Thomas informing of above. Awaiting response. Pt's H&H 8.8/27.8 this AM.     0910  Voicemail message left for Michelle informing that the pt is medically stable to discharge today & of NH orders manually faxed to (f [phone]). Awaiting response.     0920  (-) covid result faxed to Michelle as requested.     0930  Patient resting quietly in bed when CM rounded via [employer]. No family present. Patient in agreement with plan to discharge back to the [facility] today & requested that this CM inform his son of above.     CM informed the pt's son, Axel Burgos ([phone]), via phone of the pt's discharge status. Son verbalized understanding & agreement but questioned if psych had determined if the pt has capacity to make decisions for himself. CM informed Axel that per psych note on 2/16/2024, "Lacks capacity to make medical decisions". Pt's H&H 8.8/27/8 this AM.    1045  CM was informed by Michelle that the pt will be accepted for admission today to room 214B & requested that report be called.     1100  Ambulance transportation scheduled with requested pickup at 1200. Transportation packet left at the nurse's station.     Message sent to nurse Latyra & virtual nurse Angi informing of above & requesting that Cayetano call report.       Will continue to follow.   "

## 2024-02-20 NOTE — PLAN OF CARE
[facility]    FACILITY TRANSFER ORDERS      Patient Name: Axel Burgos  YOB: 1945    PCP: Home, [facility]   PCP Address: [address] / [place] [address]  PCP Phone Number: [phone]  PCP Fax: [phone]    Encounter Date: 02/20/2024    Admit to: [facility]     Vital Signs:  Routine    Diagnoses:   Active Hospital Problems    Diagnosis  POA    *GI bleed [K92.2]  Yes    Dementia without behavioral disturbance [F03.90]  Yes    Acute blood loss anemia [D62]  Yes    Symptomatic anemia [D64.9]  Yes    Normocytic anemia [D64.9]  Yes    Chronic idiopathic constipation [K59.04]  Yes    Anticoagulant long-term use [Z79.01]  Not Applicable    Anemia of chronic disease [D63.8]  Yes     Chronic    Essential hypertension [I10]  Yes     Chronic    Mild protein malnutrition [E44.1]  Yes     Chronic    Type 2 diabetes mellitus, controlled [E11.9]  Yes     Chronic    Chronic venous insufficiency [I87.2]  Yes      Resolved Hospital Problems   No resolved problems to display.       Allergies:Review of patient's allergies indicates:  No Known Allergies    Diet: cardiac diet    Activities: Activity as tolerated    Goals of Care Treatment Preferences:  Code Status: Full Code    Health care agent: [facility] agent number: [phone]      Nursing: per facility protocol       Medications: Review discharge medications with patient and family and provide education.      Current Discharge Medication List        CONTINUE these medications which have NOT CHANGED    Details   amLODIPine (NORVASC) 2.5 MG tablet Take 2.5 mg by mouth once daily.      ascorbic acid, vitamin C, (VITAMIN C) 500 MG tablet Take 500 mg by mouth once daily.      aspirin (ECOTRIN) 81 MG EC tablet Take 81 mg by mouth once daily.      atorvastatin (LIPITOR) 20 MG tablet Take 20 mg by mouth once daily.      docusate sodium (COLACE) 100 MG capsule Take 100 mg by mouth 2 (two) times daily.       dulaglutide (TRULICITY) 0.75 mg/0.5 mL pen injector Inject 0.75 mg into the skin every Thursday.      ferrous sulfate 324 mg (65 mg iron) TbEC Take 324 mg by mouth 2 (two) times daily.      lisinopriL (PRINIVIL,ZESTRIL) 5 MG tablet Take 5 mg by mouth once daily.      menthol-zinc oxide (CALMOSEPTINE) 0.44-20.6 % Oint Apply topically as needed (redness/irritation). Apply to buttocks/groin      nut.tx.glucose intolerance,soy (GLUCERNA ORAL) Take 1 Can by mouth 3 (three) times daily.      omeprazole (PRILOSEC) 40 MG capsule Take 40 mg by mouth 2 (two) times a day.      vitamin D (VITAMIN D3) 1000 units Tab Take 1,000 Units by mouth once daily.           STOP taking these medications       apixaban (ELIQUIS) 5 mg Tab Comments:   Reason for Stopping:         povidone-iodine (BETADINE) 10 % external solution Comments:   Reason for Stopping:                  Immunizations Administered as of 2/20/2024       No immunizations on file.            This patient has had both covid vaccinations    Some patients may experience side effects after vaccination.  These may include fever, headache, muscle or joint aches.  Most symptoms resolve with 24-48 hours and do not require urgent medical evaluation unless they persist for more than 72 hours or symptoms are concerning for an unrelated medical condition.          _________________________________  Eulalia Cornejo MD  02/20/2024

## 2024-02-20 NOTE — SUBJECTIVE & OBJECTIVE
Interval History:  awake and alert, no new complaint,    H/H stable- monitor continue Aspirin      Review of Systems   Constitutional:  Negative for chills, fatigue and fever.   Respiratory:  Negative for chest tightness, shortness of breath and wheezing.    Cardiovascular:  Negative for chest pain, palpitations and leg swelling.   Gastrointestinal:  Negative for abdominal distention, abdominal pain, constipation, diarrhea, nausea and vomiting.   Genitourinary:  Negative for dysuria, flank pain and hematuria.   Musculoskeletal:  Negative for joint swelling.   Neurological:  Negative for seizures and speech difficulty.   Psychiatric/Behavioral:  Negative for agitation and confusion.      Objective:     Vital Signs (Most Recent):  Temp: 98 °F (36.7 °C) (02/20/24 0730)  Pulse: 83 (02/20/24 0730)  Resp: 16 (02/20/24 0730)  BP: 131/75 (02/20/24 0730)  SpO2: 96 % (02/20/24 0730) Vital Signs (24h Range):  Temp:  [97.3 °F (36.3 °C)-98.7 °F (37.1 °C)] 98 °F (36.7 °C)  Pulse:  [] 83  Resp:  [16-20] 16  SpO2:  [92 %-100 %] 96 %  BP: (121-144)/(57-75) 131/75     Weight: 69.9 kg (154 lb 1.6 oz)  Body mass index is 19.79 kg/m².    Intake/Output Summary (Last 24 hours) at 2/20/2024 1437  Last data filed at 2/20/2024 0800  Gross per 24 hour   Intake 2473.32 ml   Output 0 ml   Net 2473.32 ml           Physical Exam  Constitutional:       General: He is not in acute distress.     Appearance: Normal appearance.   HENT:      Head: Normocephalic.      Right Ear: External ear normal.      Left Ear: External ear normal.   Cardiovascular:      Rate and Rhythm: Normal rate and regular rhythm.   Pulmonary:      Effort: Pulmonary effort is normal. No respiratory distress.      Breath sounds: Normal breath sounds. No wheezing.   Abdominal:      General: Bowel sounds are normal. There is no distension.      Palpations: Abdomen is soft.      Tenderness: There is no abdominal tenderness.   Musculoskeletal:         General: No swelling.  Normal range of motion.      Cervical back: Neck supple.   Skin:     General: Skin is warm.      Capillary Refill: Capillary refill takes less than 2 seconds.      Findings: No erythema.   Neurological:      General: No focal deficit present.      Mental Status: He is oriented to person, place, and time. Mental status is at baseline.   Psychiatric:         Mood and Affect: Mood normal.         Behavior: Behavior normal.             Significant Labs: All pertinent labs within the past 24 hours have been reviewed.    Significant Imaging: I have reviewed all pertinent imaging results/findings within the past 24 hours.

## 2024-02-20 NOTE — ASSESSMENT & PLAN NOTE
Patient with dementia with likely etiology of unknown dementia. Dementia is moderate. The patient does not have signs of behavioral disturbance. Home dementia medications are Held or Continued:  Not on medication . Continue non-pharmacologic interventions to prevent delirium (No VS between 11PM-5AM, activity during day, opening blinds, providing glasses/hearing aids, and up in chair during daytime). Will avoid narcotics and benzos unless absolutely necessary. PRN anti-psychotics are not prescribed to avoid self harm behaviors.    Patient without behavior disturbance  Psychiatry evaluated patient to determine if he has capacity given his poor insight into his medical condition    Plan  -patient lacks capacity  -patient's son Axel Burgos ([phone]) who have POA to make decisions  -frequent reorientation as needed  -avoid and or minimal the use of opioid, anticholinergic, benzodiazepines or other sedating medication

## 2024-02-20 NOTE — NURSING
Pt meets criteria for discharge. VSS. Tolerating dysphagia soft diet. AVS placed in pts packet. Report called to receiving nurse @NH. IV removed. Pt to be discharged to NH.

## 2024-02-21 ENCOUNTER — OUTSIDE PLACE OF SERVICE (OUTPATIENT)
Dept: ADMINISTRATIVE | Facility: OTHER | Age: 79
End: 2024-02-21
Payer: MEDICARE

## 2024-02-21 PROCEDURE — 99308 SBSQ NF CARE LOW MDM 20: CPT | Mod: ,,, | Performed by: FAMILY MEDICINE

## 2024-02-21 NOTE — PLAN OF CARE
[facility] - Med Surg  Discharge Final Note    Primary Care Provider: Home, [facility]    Expected Discharge Date: 2/20/2024    Final Discharge Note (most recent)       Final Note - 02/21/24 0744          Final Note    Assessment Type Final Discharge Note (P)      Anticipated Discharge Disposition [facility] (P)    [facility]       Post-Acute Status    Post-Acute Authorization Placement (P)      Post-Acute Placement Status Set-up Complete/Auth obtained (P)    [facility]    Discharge Delays [facility]FC Arranged Transportation (P)

## 2024-02-22 ENCOUNTER — PATIENT OUTREACH (OUTPATIENT)
Dept: ADMINISTRATIVE | Facility: CLINIC | Age: 79
End: 2024-02-22
Payer: MEDICARE

## 2024-02-23 NOTE — PHYSICIAN QUERY
PT Name: Axel Burgos  MR #: [id number]     Documentation Clarification      CDS/: Elmira Burgos RN, CDI            Contact information:[email or web address]    This form is a permanent document in the medical record.     Query Date: February 23, 2024    By submitting this query, we are merely seeking further clarification of documentation. Please utilize your independent clinical judgment when addressing the question(s) below.    The Medical Record reflects the following:    Supporting Clinical Findings Location in Medical Record   PMHx of DM2, CVA x2 on apixaban and ASA, HTN, and varicose veins presents to [facility] ED via EMS from [facility] for symptomatic anemia (hgb 6.0) and positive occult blood during routine testing.   No active bleeding from rectum.     GI bleed  Positive stool occult blood  Hold long term oral anticoagulant  Trend H/H   NPO  Trend to keep Hgb> 7  Protonix IV  Consult GI   H&P 2/14   Stool light brown in ED, BP responded to IVF. Denies N/V, melena, hematochezia, and abdominal pain. CT angio A/P with no extravasation, potential stercoral colitis.     Normocytic anemia  Chronic anemia that has slowly progressed since 2015  No signs of acute blood loss, brown stool, CTA negative    Ordered anemia profile   Discussed EGD/Colonoscopy today, patient would like to avoid bowel prep, willing to consider EGD as inpatient   NPO at midnight, tentative plan for EGD tomorrow if patient amendable   GI consult 2/15   2/15  -evaluated by GI we will plan for endoscopy however patient has declined and son is aware  -we will reach out to GI tomorrow to see if it is safe to resume Eliquis    2/16  -hemoglobin dropped from 8.2-6.9  -consented for blood transfusion  -transfusing 1 unit of PRBC  -holding anticoagulation  -patient seen to have very poor insight into his medical condition, Psychiatry and palliative Care consulted.     2/17-2/18  -H&H stable  -follow up on H pylori test   Discharge summ 2/20                                                                             Provider, please clarify the diagnosis at discharge:     [   x] Suspected GI bleed   [   ] Guiac positive stool only   [   ] Other (please specify): ____________

## 2024-02-23 NOTE — PHYSICIAN QUERY
PT Name: Axel Burgos  MR #: [id number]    DOCUMENTATION CLARIFICATION      CDS/: Elmira Burgos RN, CDI            Contact information:[email or web address]    This form is a permanent document in the medical record.      Query Date: February 23, 2024    By submitting this query, we are merely seeking further clarification of documentation. Please utilize your independent clinical judgment when addressing the question(s) below.    The Medical Record contains the following:   Indicators  Supporting Clinical Findings Location in Medical Record   x Anemia documented Acute blood loss anemia   Etiology likely d/t acute blood loss which was from GI bleed     Normocytic anemia  Chronic anemia that has slowly progressed since 2015  No signs of acute blood loss, brown stool, CTA negative     H&P 2/14        GI consult 2/15   x H&H  02/14/24 07:51 02/14/24 18:22 02/15/24 06:06 02/16/24 13:07 02/17/24 06:54   Hemoglobin 7.2  8.0  8.2  6.9  8.4    Hematocrit 24.1  25.6  27.0  21.9  26.3     Lab    x BP                    HR 2/14  Pulse:  [] 101  BP: ()/(46-85) 100/59    2/15  Pulse:  [] 96  BP: ()/(51-85) 105/55    2/16  Pulse:  [60-92] 67  BP: ()/(50-70) 94/50    2/17  Pulse:  [] 76  BP: ()/(52-76) 110/63 Vitals    x Bleeding Positive for blood in stool.  No active bleeding from rectum  H&P 2/14    Procedure/Surgery Performed/EBL     x Transfusion(s)  Has received 1 units of PRBCs on 2/14 in the ED on arrival     transfusing 1 unit of PRBC  H&P 2/14    HM PN 2/16   x Acute/Chronic illness PMHx of DM2, CVA x2 on apixaban and ASA, HTN, and varicose veins presents to [facility] ED via EMS from [facility] for symptomatic anemia (hgb 6.0) and positive occult blood during routine testing.  H&P 2/14   x Treatments Monitor serial CBC and transfuse if patient becomes hemodynamically unstable, symptomatic or H/H drops below 7/21.     Ordered anemia profile     holding anticoagulation  H&P  2/14          HM PN 2/16    Other       Provider, please clarify the acuity of the blood loss anemia.  [  x ] Acute on chronic blood loss anemia    [   ] Chronic blood loss anemia     [   ] Other Hematological Diagnosis (please specify): _________________     Please document in your progress notes daily for the duration of treatment, until resolved, and include in your discharge summary.    Form No. [id number]

## 2024-02-28 ENCOUNTER — OUTSIDE PLACE OF SERVICE (OUTPATIENT)
Dept: ADMINISTRATIVE | Facility: OTHER | Age: 79
End: 2024-02-28
Payer: MEDICARE

## 2024-02-28 PROCEDURE — 99308 SBSQ NF CARE LOW MDM 20: CPT | Mod: ,,, | Performed by: FAMILY MEDICINE

## 2024-05-02 ENCOUNTER — TELEPHONE (OUTPATIENT)
Dept: FAMILY MEDICINE | Facility: CLINIC | Age: 79
End: 2024-05-02
Payer: MEDICARE

## 2024-05-02 NOTE — TELEPHONE ENCOUNTER
----- Message from Wilner Garnica sent at 2024  2:03 PM CDT -----  Contact: [phone]  Type: Requesting to speak with nurse        Who Called: [facility]   Regarding: doctor  needs to sign death certificate   Would the patient rather a call back or a response via [facility]? Call back  Best Call Back Number: [phone]  Additional Information:

## 2024-05-20 PROBLEM — K92.2 GI BLEED: Status: RESOLVED | Noted: 2024-02-14 | Resolved: 2024-05-20
